# Patient Record
Sex: FEMALE | Race: WHITE | URBAN - METROPOLITAN AREA
[De-identification: names, ages, dates, MRNs, and addresses within clinical notes are randomized per-mention and may not be internally consistent; named-entity substitution may affect disease eponyms.]

---

## 2017-01-10 ENCOUNTER — OUTPATIENT (OUTPATIENT)
Dept: OUTPATIENT SERVICES | Facility: HOSPITAL | Age: 67
LOS: 1 days | Discharge: HOME | End: 2017-01-10

## 2017-01-10 DIAGNOSIS — D47.2 MONOCLONAL GAMMOPATHY: ICD-10-CM

## 2017-01-11 ENCOUNTER — APPOINTMENT (OUTPATIENT)
Dept: INFUSION THERAPY | Facility: HOSPITAL | Age: 67
End: 2017-01-11

## 2017-01-12 ENCOUNTER — APPOINTMENT (OUTPATIENT)
Dept: INFUSION THERAPY | Facility: HOSPITAL | Age: 67
End: 2017-01-12

## 2017-02-14 ENCOUNTER — OUTPATIENT (OUTPATIENT)
Dept: OUTPATIENT SERVICES | Facility: HOSPITAL | Age: 67
LOS: 1 days | Discharge: HOME | End: 2017-02-14

## 2017-02-14 DIAGNOSIS — D47.2 MONOCLONAL GAMMOPATHY: ICD-10-CM

## 2017-02-14 DIAGNOSIS — L98.5 MUCINOSIS OF THE SKIN: ICD-10-CM

## 2017-02-15 ENCOUNTER — APPOINTMENT (OUTPATIENT)
Dept: HEMATOLOGY ONCOLOGY | Facility: CLINIC | Age: 67
End: 2017-02-15

## 2017-02-22 ENCOUNTER — MEDICATION RENEWAL (OUTPATIENT)
Age: 67
End: 2017-02-22

## 2017-02-22 ENCOUNTER — APPOINTMENT (OUTPATIENT)
Dept: INFUSION THERAPY | Facility: HOSPITAL | Age: 67
End: 2017-02-22

## 2017-02-23 ENCOUNTER — APPOINTMENT (OUTPATIENT)
Dept: INFUSION THERAPY | Facility: HOSPITAL | Age: 67
End: 2017-02-23

## 2017-02-25 ENCOUNTER — OUTPATIENT (OUTPATIENT)
Dept: OUTPATIENT SERVICES | Facility: HOSPITAL | Age: 67
LOS: 1 days | End: 2017-02-25
Payer: MEDICARE

## 2017-02-25 ENCOUNTER — APPOINTMENT (OUTPATIENT)
Dept: CT IMAGING | Facility: IMAGING CENTER | Age: 67
End: 2017-02-25

## 2017-02-25 DIAGNOSIS — Z00.8 ENCOUNTER FOR OTHER GENERAL EXAMINATION: ICD-10-CM

## 2017-02-25 PROCEDURE — 71250 CT THORAX DX C-: CPT

## 2017-02-27 ENCOUNTER — APPOINTMENT (OUTPATIENT)
Dept: NUCLEAR MEDICINE | Facility: HOSPITAL | Age: 67
End: 2017-02-27

## 2017-02-27 ENCOUNTER — OUTPATIENT (OUTPATIENT)
Dept: OUTPATIENT SERVICES | Facility: HOSPITAL | Age: 67
LOS: 1 days | End: 2017-02-27
Payer: MEDICARE

## 2017-02-27 DIAGNOSIS — I26.90 SEPTIC PULMONARY EMBOLISM WITHOUT ACUTE COR PULMONALE: ICD-10-CM

## 2017-02-27 PROCEDURE — A9540: CPT

## 2017-02-27 PROCEDURE — A9567: CPT

## 2017-02-27 PROCEDURE — 78582 LUNG VENTILAT&PERFUS IMAGING: CPT

## 2017-02-27 PROCEDURE — 71046 X-RAY EXAM CHEST 2 VIEWS: CPT

## 2017-03-05 ENCOUNTER — TRANSCRIPTION ENCOUNTER (OUTPATIENT)
Age: 67
End: 2017-03-05

## 2017-03-06 ENCOUNTER — APPOINTMENT (OUTPATIENT)
Dept: HEMATOLOGY ONCOLOGY | Facility: CLINIC | Age: 67
End: 2017-03-06

## 2017-03-06 VITALS
WEIGHT: 200.84 LBS | DIASTOLIC BLOOD PRESSURE: 84 MMHG | RESPIRATION RATE: 16 BRPM | HEART RATE: 86 BPM | SYSTOLIC BLOOD PRESSURE: 132 MMHG | BODY MASS INDEX: 31.46 KG/M2 | TEMPERATURE: 97.9 F | OXYGEN SATURATION: 93 %

## 2017-03-13 ENCOUNTER — APPOINTMENT (OUTPATIENT)
Dept: UROLOGY | Facility: CLINIC | Age: 67
End: 2017-03-13

## 2017-03-15 ENCOUNTER — RESULT REVIEW (OUTPATIENT)
Age: 67
End: 2017-03-15

## 2017-03-16 ENCOUNTER — OUTPATIENT (OUTPATIENT)
Dept: OUTPATIENT SERVICES | Facility: HOSPITAL | Age: 67
LOS: 1 days | End: 2017-03-16
Payer: MEDICARE

## 2017-03-16 DIAGNOSIS — R91.8 OTHER NONSPECIFIC ABNORMAL FINDING OF LUNG FIELD: ICD-10-CM

## 2017-03-16 LAB
GRAM STN FLD: SIGNIFICANT CHANGE UP
NIGHT BLUE STAIN TISS: SIGNIFICANT CHANGE UP
SPECIMEN SOURCE: SIGNIFICANT CHANGE UP
SPECIMEN SOURCE: SIGNIFICANT CHANGE UP

## 2017-03-16 PROCEDURE — 88305 TISSUE EXAM BY PATHOLOGIST: CPT | Mod: 26

## 2017-03-16 PROCEDURE — 88112 CYTOPATH CELL ENHANCE TECH: CPT | Mod: 26

## 2017-03-16 PROCEDURE — 88313 SPECIAL STAINS GROUP 2: CPT | Mod: 26

## 2017-03-16 PROCEDURE — 87102 FUNGUS ISOLATION CULTURE: CPT

## 2017-03-16 PROCEDURE — 87015 SPECIMEN INFECT AGNT CONCNTJ: CPT

## 2017-03-16 PROCEDURE — 87070 CULTURE OTHR SPECIMN AEROBIC: CPT

## 2017-03-16 PROCEDURE — 71045 X-RAY EXAM CHEST 1 VIEW: CPT

## 2017-03-16 PROCEDURE — 31624 DX BRONCHOSCOPE/LAVAGE: CPT | Mod: RT

## 2017-03-16 PROCEDURE — 76000 FLUOROSCOPY <1 HR PHYS/QHP: CPT

## 2017-03-16 PROCEDURE — 71010: CPT | Mod: 26

## 2017-03-16 PROCEDURE — 87206 SMEAR FLUORESCENT/ACID STAI: CPT

## 2017-03-16 PROCEDURE — 87116 MYCOBACTERIA CULTURE: CPT

## 2017-03-16 PROCEDURE — 88313 SPECIAL STAINS GROUP 2: CPT

## 2017-03-16 PROCEDURE — 88112 CYTOPATH CELL ENHANCE TECH: CPT

## 2017-03-16 PROCEDURE — 31625 BRONCHOSCOPY W/BIOPSY(S): CPT | Mod: RT

## 2017-03-16 PROCEDURE — 88305 TISSUE EXAM BY PATHOLOGIST: CPT

## 2017-03-18 LAB
CULTURE RESULTS: SIGNIFICANT CHANGE UP
SPECIMEN SOURCE: SIGNIFICANT CHANGE UP

## 2017-03-20 LAB — NON-GYNECOLOGICAL CYTOLOGY STUDY: SIGNIFICANT CHANGE UP

## 2017-03-27 ENCOUNTER — APPOINTMENT (OUTPATIENT)
Dept: UROLOGY | Facility: CLINIC | Age: 67
End: 2017-03-27

## 2017-03-28 ENCOUNTER — OUTPATIENT (OUTPATIENT)
Dept: OUTPATIENT SERVICES | Facility: HOSPITAL | Age: 67
LOS: 1 days | Discharge: HOME | End: 2017-03-28

## 2017-03-28 DIAGNOSIS — D47.2 MONOCLONAL GAMMOPATHY: ICD-10-CM

## 2017-03-28 DIAGNOSIS — L98.5 MUCINOSIS OF THE SKIN: ICD-10-CM

## 2017-03-29 ENCOUNTER — APPOINTMENT (OUTPATIENT)
Dept: INFUSION THERAPY | Facility: HOSPITAL | Age: 67
End: 2017-03-29

## 2017-03-30 ENCOUNTER — APPOINTMENT (OUTPATIENT)
Dept: INFUSION THERAPY | Facility: HOSPITAL | Age: 67
End: 2017-03-30

## 2017-03-30 LAB
APPEARANCE: ABNORMAL
BACTERIA UR CULT: ABNORMAL
BACTERIA: ABNORMAL
BILIRUBIN URINE: ABNORMAL
BLOOD URINE: ABNORMAL
COLOR: YELLOW
GLUCOSE QUALITATIVE U: NORMAL
HYALINE CASTS: 0 /LPF
KETONES URINE: ABNORMAL
LEUKOCYTE ESTERASE URINE: ABNORMAL
MICROSCOPIC-UA: NORMAL
NITRITE URINE: POSITIVE
PH URINE: 6
PROTEIN URINE: 100
RED BLOOD CELLS URINE: 200 /HPF
SPECIFIC GRAVITY URINE: 1.03
SQUAMOUS EPITHELIAL CELLS: 2 /HPF
UROBILINOGEN URINE: 1
WHITE BLOOD CELLS URINE: >720 /HPF

## 2017-04-03 DIAGNOSIS — Z79.2 LONG TERM (CURRENT) USE OF ANTIBIOTICS: ICD-10-CM

## 2017-04-15 LAB
CULTURE RESULTS: SIGNIFICANT CHANGE UP
SPECIMEN SOURCE: SIGNIFICANT CHANGE UP

## 2017-04-18 ENCOUNTER — MESSAGE (OUTPATIENT)
Age: 67
End: 2017-04-18

## 2017-04-18 LAB
APPEARANCE: ABNORMAL
BACTERIA UR CULT: ABNORMAL
BACTERIA: ABNORMAL
BILIRUBIN URINE: NEGATIVE
BLOOD URINE: ABNORMAL
CALCIUM OXALATE CRYSTALS: ABNORMAL
COLOR: YELLOW
GLUCOSE QUALITATIVE U: NORMAL MG/DL
HYALINE CASTS: 0 /LPF
KETONES URINE: NEGATIVE
LEUKOCYTE ESTERASE URINE: ABNORMAL
MICROSCOPIC-UA: NORMAL
NITRITE URINE: POSITIVE
PH URINE: 5.5
PROTEIN URINE: NEGATIVE MG/DL
RED BLOOD CELLS URINE: 37 /HPF
SPECIFIC GRAVITY URINE: 1.02
SQUAMOUS EPITHELIAL CELLS: 4 /HPF
UROBILINOGEN URINE: NORMAL MG/DL
WHITE BLOOD CELLS URINE: 315 /HPF

## 2017-04-18 RX ORDER — NITROFURANTOIN MACROCRYSTALS 50 MG/1
50 CAPSULE ORAL
Qty: 30 | Refills: 1 | Status: DISCONTINUED | COMMUNITY
Start: 2017-03-30 | End: 2017-04-18

## 2017-04-18 RX ORDER — SULFAMETHOXAZOLE AND TRIMETHOPRIM 800; 160 MG/1; MG/1
800-160 TABLET ORAL
Qty: 14 | Refills: 0 | Status: DISCONTINUED | COMMUNITY
Start: 2017-04-18 | End: 2017-04-18

## 2017-04-18 RX ORDER — FOSFOMYCIN TROMETHAMINE 3 G/1
3 POWDER ORAL
Qty: 2 | Refills: 0 | Status: DISCONTINUED | COMMUNITY
Start: 2017-04-18 | End: 2017-04-18

## 2017-04-18 RX ORDER — FOSFOMYCIN TROMETHAMINE 3 G/1
3 POWDER ORAL
Qty: 2 | Refills: 0 | Status: DISCONTINUED | COMMUNITY
Start: 2017-03-30 | End: 2017-04-18

## 2017-04-18 RX ORDER — AMOXICILLIN 500 MG/1
500 TABLET, FILM COATED ORAL
Qty: 20 | Refills: 0 | Status: DISCONTINUED | COMMUNITY
Start: 2017-04-03 | End: 2017-04-18

## 2017-04-29 LAB
CULTURE RESULTS: SIGNIFICANT CHANGE UP
SPECIMEN SOURCE: SIGNIFICANT CHANGE UP

## 2017-05-08 ENCOUNTER — OUTPATIENT (OUTPATIENT)
Dept: OUTPATIENT SERVICES | Facility: HOSPITAL | Age: 67
LOS: 1 days | Discharge: HOME | End: 2017-05-08

## 2017-05-08 DIAGNOSIS — D47.2 MONOCLONAL GAMMOPATHY: ICD-10-CM

## 2017-05-10 ENCOUNTER — APPOINTMENT (OUTPATIENT)
Dept: INFUSION THERAPY | Facility: HOSPITAL | Age: 67
End: 2017-05-10

## 2017-05-11 ENCOUNTER — APPOINTMENT (OUTPATIENT)
Dept: INFUSION THERAPY | Facility: HOSPITAL | Age: 67
End: 2017-05-11

## 2017-05-12 ENCOUNTER — EMERGENCY (EMERGENCY)
Facility: HOSPITAL | Age: 67
LOS: 1 days | Discharge: HOME | End: 2017-05-12
Attending: EMERGENCY MEDICINE | Admitting: EMERGENCY MEDICINE
Payer: MEDICARE

## 2017-05-12 VITALS
SYSTOLIC BLOOD PRESSURE: 170 MMHG | HEART RATE: 70 BPM | OXYGEN SATURATION: 96 % | RESPIRATION RATE: 18 BRPM | TEMPERATURE: 100 F | DIASTOLIC BLOOD PRESSURE: 99 MMHG

## 2017-05-12 DIAGNOSIS — R51 HEADACHE: ICD-10-CM

## 2017-05-12 LAB
ALBUMIN SERPL ELPH-MCNC: 3.5 G/DL — SIGNIFICANT CHANGE UP (ref 3.3–5)
ALP SERPL-CCNC: 45 U/L — SIGNIFICANT CHANGE UP (ref 40–120)
ALT FLD-CCNC: 27 U/L RC — SIGNIFICANT CHANGE UP (ref 10–45)
ANION GAP SERPL CALC-SCNC: 14 MMOL/L — SIGNIFICANT CHANGE UP (ref 5–17)
APPEARANCE UR: CLEAR — SIGNIFICANT CHANGE UP
AST SERPL-CCNC: 47 U/L — HIGH (ref 10–40)
BASOPHILS # BLD AUTO: 0 K/UL — SIGNIFICANT CHANGE UP (ref 0–0.2)
BASOPHILS NFR BLD AUTO: 0 % — SIGNIFICANT CHANGE UP (ref 0–2)
BILIRUB SERPL-MCNC: 0.3 MG/DL — SIGNIFICANT CHANGE UP (ref 0.2–1.2)
BILIRUB UR-MCNC: NEGATIVE — SIGNIFICANT CHANGE UP
BUN SERPL-MCNC: 10 MG/DL — SIGNIFICANT CHANGE UP (ref 7–23)
CALCIUM SERPL-MCNC: 8.6 MG/DL — SIGNIFICANT CHANGE UP (ref 8.4–10.5)
CHLORIDE SERPL-SCNC: 98 MMOL/L — SIGNIFICANT CHANGE UP (ref 96–108)
CO2 SERPL-SCNC: 18 MMOL/L — LOW (ref 22–31)
COLOR SPEC: YELLOW — SIGNIFICANT CHANGE UP
COMMENT - URINE: SIGNIFICANT CHANGE UP
CREAT SERPL-MCNC: 0.5 MG/DL — SIGNIFICANT CHANGE UP (ref 0.5–1.3)
DIFF PNL FLD: ABNORMAL
EOSINOPHIL # BLD AUTO: 0 K/UL — SIGNIFICANT CHANGE UP (ref 0–0.5)
EOSINOPHIL NFR BLD AUTO: 1.4 % — SIGNIFICANT CHANGE UP (ref 0–6)
EPI CELLS # UR: SIGNIFICANT CHANGE UP /HPF
GAS PNL BLDV: SIGNIFICANT CHANGE UP
GLUCOSE SERPL-MCNC: 111 MG/DL — HIGH (ref 70–99)
GLUCOSE UR QL: NEGATIVE — SIGNIFICANT CHANGE UP
HCT VFR BLD CALC: 29.2 % — LOW (ref 34.5–45)
HGB BLD-MCNC: 10 G/DL — LOW (ref 11.5–15.5)
KETONES UR-MCNC: NEGATIVE — SIGNIFICANT CHANGE UP
LEUKOCYTE ESTERASE UR-ACNC: NEGATIVE — SIGNIFICANT CHANGE UP
LYMPHOCYTES # BLD AUTO: 0.1 K/UL — LOW (ref 1–3.3)
LYMPHOCYTES # BLD AUTO: 7.4 % — LOW (ref 13–44)
MCHC RBC-ENTMCNC: 34.3 GM/DL — SIGNIFICANT CHANGE UP (ref 32–36)
MCHC RBC-ENTMCNC: 37.4 PG — HIGH (ref 27–34)
MCV RBC AUTO: 109 FL — HIGH (ref 80–100)
MONOCYTES # BLD AUTO: 0.2 K/UL — SIGNIFICANT CHANGE UP (ref 0–0.9)
MONOCYTES NFR BLD AUTO: 12.2 % — SIGNIFICANT CHANGE UP (ref 2–14)
NEUTROPHILS # BLD AUTO: 1.2 K/UL — LOW (ref 1.8–7.4)
NEUTROPHILS NFR BLD AUTO: 79 % — HIGH (ref 43–77)
NITRITE UR-MCNC: NEGATIVE — SIGNIFICANT CHANGE UP
PH UR: 6.5 — SIGNIFICANT CHANGE UP (ref 5–8)
PLATELET # BLD AUTO: 85 K/UL — LOW (ref 150–400)
POTASSIUM SERPL-MCNC: 3.6 MMOL/L — SIGNIFICANT CHANGE UP (ref 3.5–5.3)
POTASSIUM SERPL-SCNC: 3.6 MMOL/L — SIGNIFICANT CHANGE UP (ref 3.5–5.3)
PROT SERPL-MCNC: 8.6 G/DL — HIGH (ref 6–8.3)
PROT UR-MCNC: SIGNIFICANT CHANGE UP
RBC # BLD: 2.68 M/UL — LOW (ref 3.8–5.2)
RBC # FLD: 12.7 % — SIGNIFICANT CHANGE UP (ref 10.3–14.5)
RBC CASTS # UR COMP ASSIST: ABNORMAL /HPF (ref 0–2)
SODIUM SERPL-SCNC: 130 MMOL/L — LOW (ref 135–145)
SP GR SPEC: 1.02 — SIGNIFICANT CHANGE UP (ref 1.01–1.02)
UROBILINOGEN FLD QL: NEGATIVE — SIGNIFICANT CHANGE UP
WBC # BLD: 1.6 K/UL — LOW (ref 3.8–10.5)
WBC # FLD AUTO: 1.6 K/UL — LOW (ref 3.8–10.5)
WBC UR QL: SIGNIFICANT CHANGE UP /HPF (ref 0–5)

## 2017-05-12 PROCEDURE — 99284 EMERGENCY DEPT VISIT MOD MDM: CPT

## 2017-05-12 PROCEDURE — 70450 CT HEAD/BRAIN W/O DYE: CPT | Mod: 26

## 2017-05-12 RX ORDER — METOCLOPRAMIDE HCL 10 MG
10 TABLET ORAL ONCE
Qty: 0 | Refills: 0 | Status: COMPLETED | OUTPATIENT
Start: 2017-05-12 | End: 2017-05-12

## 2017-05-12 RX ORDER — ACETAMINOPHEN 500 MG
1000 TABLET ORAL ONCE
Qty: 0 | Refills: 0 | Status: COMPLETED | OUTPATIENT
Start: 2017-05-12 | End: 2017-05-12

## 2017-05-12 RX ADMIN — Medication 400 MILLIGRAM(S): at 22:34

## 2017-05-12 RX ADMIN — Medication 10 MILLIGRAM(S): at 22:00

## 2017-05-12 NOTE — ED PROVIDER NOTE - SKIN, MLM
Skin normal color for race, warm, dry and intact. **ATTENDING ADDENDUM (Dr. Tramaine Tejada): NO rashes, lesions, vesicles, cellulitis, petechiae, purpurae, track marks or ecchymoses.

## 2017-05-12 NOTE — ED PROVIDER NOTE - ATTENDING CONTRIBUTION TO CARE
**ATTENDING ADDENDUM (Dr. Tramaine Tejada): I attest that I have directly examined this patient and reviewed and formulated the diagnostic and therapeutic management plan in collaboration with the advanced practitioner (NP, PA). Please see MDM note and remainder of EMR for findings from CC, HPI, ROS, and PE. (Sarah)

## 2017-05-12 NOTE — ED ADULT NURSE NOTE - PMH
Atrial fibrillation    Blood Chemistry Abnormality  Scleramyxedema  Hyperlipidemia    Transplants  Stem cell transplant March 2001  Tubal Ligation  1988  Uterus Problem  Prolapse   total abdominal hysterectomy

## 2017-05-12 NOTE — ED ADULT NURSE NOTE - OBJECTIVE STATEMENT
66F with PMH a.fib on xarelto, multiple myeloma, and scleroderma presenting with worsening HA since yesterday afternoon. Pt follows at [facility] and had ninlaro 2 days ago, and IVIG last 2 days. Reports she normally gets HA and fever/chills after IVIG but this time it's worse. HA is 9/10 located all over her head and radiating down back of neck. Took tylenol with codeine with no relief. Associated fever/chills and nausea. Also reports chronic UTIs, recently finished course of abx and awaiting f/u urine culture results from urologist. + chronic neuropathy in hands/feet, + chronic SOB.  neuro exam wnl, no facial asymmetry, vitals stable, dtr present

## 2017-05-12 NOTE — ED PROVIDER NOTE - GASTROINTESTINAL, MLM
Abdomen soft, non-tender, no guarding. Abdomen soft, non-tender **ATTENDING ADDENDUM (Dr. Tramaine Tejada): NO guarding, rebound, or rigidity. NO pulsatile or non-pulsatile masses. NO hernias. NO obvious hepatosplenomegaly.

## 2017-05-12 NOTE — ED PROVIDER NOTE - CARDIOVASCULAR NEGATIVE STATEMENT, MLM
normal rate and rhythm, no chest pain and no edema. normal rate and rhythm, no chest pain and no edema. **ATTENDING ADDENDUM (Dr. Tramaine Tejada): POSITIVE history of atrial fibrillation

## 2017-05-12 NOTE — ED PROVIDER NOTE - CARE PLAN
Principal Discharge DX:	Acute nonintractable headache, unspecified headache type Principal Discharge DX:	Acute nonintractable headache, unspecified headache type  Goal:	ATTENDING ADDENDUM (Dr. Tramaine Tejada): Goals of care include resolution of emergent/urgent symptoms and concerns, and restoration to baseline level of homeostasis. Principal Discharge DX:	Acute nonintractable headache, unspecified headache type  Goal:	ATTENDING ADDENDUM (Dr. Tramaine Tejada): Goals of care include resolution of emergent/urgent symptoms and concerns, and restoration to baseline level of homeostasis.  Instructions for follow-up, activity and diet:	ATTENDING ADDENDUM (Dr. Tramaine Tejada): (1) anticipatory guidance provided  (2) rest  (3) outpatient follow-up with your primary care physician/provider (4) return if symptoms worsen, persist, or do not resolve (5) medications, if indicated, as prescribed (oxycodone, acetaminophen, metoclopramide) for nausea and headache. (6) call your doctors at the [facility] tomorrow morning or Monday as instructed (7) if headache and/or nausea persist, worsen, or do not resolve, then consider returning for reevaluation. Principal Discharge DX:	Acute nonintractable headache, unspecified headache type  Goal:	ATTENDING ADDENDUM (Dr. Tramaine Tejada): Goals of care include resolution of emergent/urgent symptoms and concerns, and restoration to baseline level of homeostasis.  Instructions for follow-up, activity and diet:	ATTENDING ADDENDUM (Dr. Tramaine Tejada): (1) anticipatory guidance provided  (2) rest  (3) outpatient follow-up with your primary care physician/provider (4) return if symptoms worsen, persist, or do not resolve (5) medications, if indicated, as prescribed (oxycodone, acetaminophen, metoclopramide) for nausea and headache. (6) call your doctors at the [facility] tomorrow morning or Monday as instructed (7) if headache and/or nausea persist, worsen, or do not resolve, then consider returning for reevaluation. Principal Discharge DX:	Acute nonintractable headache, unspecified headache type  Goal:	ATTENDING ADDENDUM (Dr. Tramaine Tejada): Goals of care include resolution of emergent/urgent symptoms and concerns, and restoration to baseline level of homeostasis.  Instructions for follow-up, activity and diet:	ATTENDING ADDENDUM (Dr. Tramaine Tejada): (1) anticipatory guidance provided  (2) rest  (3) outpatient follow-up with your primary care physician/provider (4) return if symptoms worsen, persist, or do not resolve (5) medications, if indicated, as prescribed (oxycodone, acetaminophen, metoclopramide) for nausea and headache. (6) call your doctors at the [facility] tomorrow morning or Monday as instructed (7) if headache and/or nausea persist, worsen, or do not resolve, then consider returning for reevaluation.

## 2017-05-12 NOTE — ED PROVIDER NOTE - CONSTITUTIONAL, MLM
normal... well nourished, awake, alert, oriented to person, place, time/situation and in no apparent distress. - - -

## 2017-05-12 NOTE — ED PROVIDER NOTE - PHYSICAL EXAMINATION
**ATTENDING ADDENDUM (Dr. Tramaine Tejada): I have reviewed and substantially contributed to the elements of the PE as documented above. I have directly performed an examination of this patient in conjunction with the other members (EM resident/PA/NP) of the patient care team.

## 2017-05-12 NOTE — ED PROVIDER NOTE - PROGRESS NOTE DETAILS
**ATTENDING ADDENDUM (Dr. Tramaine Tejada): patient serially evaluated throughout ED course. NO acute deterioration up to this time in the ED. POSITIVE headache and nausea noted. Mediport just accessed, and medication administration ongoing. Anticipatory guidance provided. Will continue to observe and monitor closely to determine efficacy of interventions. **ATTENDING ADDENDUM (Dr. Tramaine Tejada): patient with some improvement following the administration of medications (metoclopramide, acetaminophen) as ordered. Sleeping at this time. Will continue to observe and monitor closely. Awaiting completion of all ED diagnostics, including differential for WBC count of 1.6/mm3. Anticipatory guidance provided. **ATTENDING ADDENDUM (Dr. Tramaine Tejada): patient serially evaluated throughout ED course. NO acute deterioration up to this time in the ED. Feels marked improvement (ambulatory to the bathroom) but still with some residual headache at this time. Will prescribe additional analgesics. Anticipatory guidance provided. Will continue to observe and monitor closely. **ATTENDING ADDENDUM (Dr. Tramaine Tejada): patient serially evaluated throughout ED course. NO acute deterioration up to this time in the ED. Feels marked improvement (ambulatory to the bathroom) but still with some residual headache at this time. Will prescribe additional analgesics. Anticipatory guidance provided. Will continue to observe and monitor closely. Call placed to oncology fellow ([facility]) to discuss lab results. 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 meningitis, meningococcemia, or encephalitis at this time. **ATTENDING ADDENDUM (Dr. Tramaine Tejada): NO callback from hematology-oncology fellow. Patient remains improved. NO evidence of neutropenia. Agree with discharge home with close outpatient followup with primary care physician/provider. Anticipatory guidance provided.

## 2017-05-12 NOTE — ED PROVIDER NOTE - OBJECTIVE STATEMENT
66F with PMH a.fib on xarelto, multiple myeloma, and scleroderma presenting with worsening HA since yesterday afternoon. Pt follows at [facility] and had ninlaro 2 days ago, and IVIG last 2 days. Reports she normally gets HA and fever/chills after IVIG but this time it's worse. HA is 9/10 located all over her head and radiating down back of neck. Took tylenol with codeine with no relief. Associated fever/chills and nausea. Also reports chronic UTIs, recently finished course of abx and awaiting f/u urine culture results from urologist. + chronic neuropathy in hands/feet, + chronic SOB.  Denies dizziness, vision changes, neck stiffness, new numbness/tingling, CP, new/worsening SOB, abd pain, vomiting, urinary symptoms. 66F with PMH a.fib on xarelto, multiple myeloma, and scleroderma presenting with worsening HA since yesterday afternoon. Pt follows at [facility] and had ninlaro 2 days ago, and IVIG last 2 days. Reports she normally gets HA and fever/chills after IVIG but this time it's worse. HA is 9/10 located all over her head and radiating down back of neck. Took tylenol with codeine with no relief. Associated fever/chills and nausea. Also reports chronic UTIs, recently finished course of abx and awaiting f/u urine culture results from urologist. + chronic neuropathy in hands/feet, + chronic SOB.  Denies dizziness, vision changes, neck stiffness, new numbness/tingling, CP, new/worsening SOB, abd pain, vomiting, urinary symptoms.  **ATTENDING ADDENDUM (Dr. Tramaine Tejada): I attest that I have directly and personally interviewed and examined this patient and elicited a comparable history of present illness and review of systems as documented, along with my EM resident. I attest that I have made significant contributions to the documentation where necessary and as noted in the EMR.

## 2017-05-12 NOTE — ED PROVIDER NOTE - MUSCULOSKELETAL [+], MLM
NECK PAIN NECK PAIN/**ATTENDING ADDENDUM (Dr. Tramaine Tejada): history of chronic back pain (s/p laminectomy). POSITIVE history of peripheral neuropathy (chronic).

## 2017-05-12 NOTE — ED ADULT NURSE NOTE - PSH
Backpain  Laminectomy  Hip Joint Replacement  left hip replacement  Knee Problem  Arthroscopy left knee

## 2017-05-12 NOTE — ED PROVIDER NOTE - MEDICAL DECISION MAKING DETAILS
**ATTENDING MEDICAL DECISION MAKING/SYNTHESIS (Dr. Tramaine Tejada): I have reviewed the Chief Complaint, the HPI, the ROS, and have directly performed and confirmed the findings on the Physical Examination. I have reviewed the medical decision making with all providers, as applicable. The PROBLEM REPRESENTATION at this time is: 66-year-old woman with history of atrial fibrillation (on Xarelto), multiple myeloma, and scleroderma now presenting with worsening HA since yesterday afternoon after receiving IVIG infusion. The MOST LIKELY DIAGNOSIS, and the LIST OF DIFFERENTIAL DIAGNOSES, includes (but is not limited to) the following: adverse drug/medication reaction (possible), intracerebral hemorrhage (possible but less likely), electrolyte-metabolic-endocrine derangements, dehydration, other inflammatory disorder, serious bacterial infection or sepsis/severe sepsis e.g. meningococcemia, meningitis (viral or bacterial), or encephalitis (unlikely given presentation and clinical findings).. The likelihood of each of these diagnoses has been appropriately considered in the context of this patient's presentation and my evaluation. PLAN: as described in EMR, including diagnostics, therapeutics and consultation as clinically warranted. I will continue to reevaluate the patient, including the results of all testing, and monitor response to therapy throughout the patient's course in the ED.

## 2017-05-12 NOTE — ED PROVIDER NOTE - CONTEXT
known (describe)/**ATTENDING ADDENDUM (Dr. Tramaine Tejada): recent IVIG and immunosuppressive therapy

## 2017-05-12 NOTE — ED PROVIDER NOTE - RESPIRATORY, MLM
Breath sounds clear and equal bilaterally. Breath sounds clear and equal bilaterally. **ATTENDING ADDENDUM (Dr. Tramaine Tejada): NO wheezing, rales, rhonchi, crackles, stridor, drooling, retractions, nasal flaring, or tripoding.

## 2017-05-12 NOTE — ED PROVIDER NOTE - PLAN OF CARE
ATTENDING ADDENDUM (Dr. Tramaine Tejada): Goals of care include resolution of emergent/urgent symptoms and concerns, and restoration to baseline level of homeostasis. ATTENDING ADDENDUM (Dr. Tramaine Tejada): (1) anticipatory guidance provided  (2) rest  (3) outpatient follow-up with your primary care physician/provider (4) return if symptoms worsen, persist, or do not resolve (5) medications, if indicated, as prescribed (oxycodone, acetaminophen, metoclopramide) for nausea and headache. (6) call your doctors at the [email or web address]] tomorrow morning or Monday as instructed (7) if headache and/or nausea persist, worsen, or do not resolve, then consider returning for reevaluation.

## 2017-05-12 NOTE — ED PROVIDER NOTE - AGGRAVATING FACTORS
**ATTENDING ADDENDUM (Dr. Tramaine Tejada): NO movement-associated, pleuritic, reproducible, positional, or exertional component to the symptoms.

## 2017-05-12 NOTE — ED PROVIDER NOTE - DIAGNOSTIC INTERPRETATION
**ATTENDING ADDENDUM (Dr. Tramaine Tejada): Radiographs reviewed. Pertinent findings include: colloid cyst without evidence of intracerebral hemorrhage, mass, or shift. (patient aware of colloid cyst)

## 2017-05-12 NOTE — ED PROVIDER NOTE - EYES, MLM
Clear bilaterally, pupils equal, round and reactive to light. Clear bilaterally, pupils equal, round and reactive to light. **ATTENDING ADDENDUM (Dr. Tramaine Tejada): Extraocular muscle movements intact. NO nystagmus. NO photophobia.

## 2017-05-13 VITALS
HEART RATE: 82 BPM | TEMPERATURE: 98 F | SYSTOLIC BLOOD PRESSURE: 125 MMHG | DIASTOLIC BLOOD PRESSURE: 74 MMHG | RESPIRATION RATE: 18 BRPM | OXYGEN SATURATION: 96 %

## 2017-05-13 LAB
CULTURE RESULTS: SIGNIFICANT CHANGE UP
SPECIMEN SOURCE: SIGNIFICANT CHANGE UP

## 2017-05-13 PROCEDURE — 84132 ASSAY OF SERUM POTASSIUM: CPT

## 2017-05-13 PROCEDURE — 82435 ASSAY OF BLOOD CHLORIDE: CPT

## 2017-05-13 PROCEDURE — 82803 BLOOD GASES ANY COMBINATION: CPT

## 2017-05-13 PROCEDURE — 99284 EMERGENCY DEPT VISIT MOD MDM: CPT | Mod: 25

## 2017-05-13 PROCEDURE — 80053 COMPREHEN METABOLIC PANEL: CPT

## 2017-05-13 PROCEDURE — 96374 THER/PROPH/DIAG INJ IV PUSH: CPT

## 2017-05-13 PROCEDURE — 96375 TX/PRO/DX INJ NEW DRUG ADDON: CPT

## 2017-05-13 PROCEDURE — 85027 COMPLETE CBC AUTOMATED: CPT

## 2017-05-13 PROCEDURE — 82947 ASSAY GLUCOSE BLOOD QUANT: CPT

## 2017-05-13 PROCEDURE — 82330 ASSAY OF CALCIUM: CPT

## 2017-05-13 PROCEDURE — 81001 URINALYSIS AUTO W/SCOPE: CPT

## 2017-05-13 PROCEDURE — 70450 CT HEAD/BRAIN W/O DYE: CPT

## 2017-05-13 PROCEDURE — 87086 URINE CULTURE/COLONY COUNT: CPT

## 2017-05-13 PROCEDURE — 84295 ASSAY OF SERUM SODIUM: CPT

## 2017-05-13 PROCEDURE — 85014 HEMATOCRIT: CPT

## 2017-05-13 PROCEDURE — 83605 ASSAY OF LACTIC ACID: CPT

## 2017-05-13 RX ORDER — OXYCODONE HYDROCHLORIDE 5 MG/1
5 TABLET ORAL ONCE
Qty: 0 | Refills: 0 | Status: DISCONTINUED | OUTPATIENT
Start: 2017-05-13 | End: 2017-05-13

## 2017-05-13 RX ORDER — OXYCODONE HYDROCHLORIDE 5 MG/1
1 TABLET ORAL
Qty: 12 | Refills: 0 | OUTPATIENT
Start: 2017-05-13 | End: 2017-05-16

## 2017-05-13 RX ORDER — METOCLOPRAMIDE HCL 10 MG
1 TABLET ORAL
Qty: 16 | Refills: 0 | OUTPATIENT
Start: 2017-05-13 | End: 2017-05-17

## 2017-05-13 RX ADMIN — OXYCODONE HYDROCHLORIDE 5 MILLIGRAM(S): 5 TABLET ORAL at 00:57

## 2017-05-13 NOTE — ED ADULT NURSE REASSESSMENT NOTE - NS ED NURSE REASSESS COMMENT FT1
+relief in headache with IV tylenol
ambulatory to/from bathroom with IV pole, dtr at bedside, dispo pending
dispo pending

## 2017-05-19 LAB
APPEARANCE: CLEAR
BACTERIA: NEGATIVE
BILIRUBIN URINE: NEGATIVE
BLOOD URINE: ABNORMAL
COLOR: YELLOW
GLUCOSE QUALITATIVE U: NORMAL MG/DL
HYALINE CASTS: 3 /LPF
KETONES URINE: NEGATIVE
LEUKOCYTE ESTERASE URINE: ABNORMAL
MICROSCOPIC-UA: NORMAL
NITRITE URINE: NEGATIVE
PH URINE: 6
PROTEIN URINE: NEGATIVE MG/DL
RED BLOOD CELLS URINE: 4 /HPF
SPECIFIC GRAVITY URINE: 1.02
SQUAMOUS EPITHELIAL CELLS: 7 /HPF
UROBILINOGEN URINE: NORMAL MG/DL
WHITE BLOOD CELLS URINE: 8 /HPF

## 2017-05-26 ENCOUNTER — APPOINTMENT (OUTPATIENT)
Dept: UROLOGY | Facility: CLINIC | Age: 67
End: 2017-05-26

## 2017-05-26 VITALS
DIASTOLIC BLOOD PRESSURE: 76 MMHG | HEIGHT: 67 IN | WEIGHT: 196 LBS | SYSTOLIC BLOOD PRESSURE: 123 MMHG | TEMPERATURE: 98.3 F | HEART RATE: 62 BPM | BODY MASS INDEX: 30.76 KG/M2 | RESPIRATION RATE: 16 BRPM

## 2017-05-30 LAB
APPEARANCE: CLEAR
BACTERIA UR CULT: NORMAL
BACTERIA: NEGATIVE
BILIRUBIN URINE: NEGATIVE
BLOOD URINE: ABNORMAL
COLOR: YELLOW
GLUCOSE QUALITATIVE U: NORMAL MG/DL
KETONES URINE: NEGATIVE
LEUKOCYTE ESTERASE URINE: NEGATIVE
MICROSCOPIC-UA: NORMAL
NITRITE URINE: NEGATIVE
PH URINE: 5.5
PROTEIN URINE: NEGATIVE MG/DL
RED BLOOD CELLS URINE: 4 /HPF
SPECIFIC GRAVITY URINE: 1.02
SQUAMOUS EPITHELIAL CELLS: 0 /HPF
UROBILINOGEN URINE: NORMAL MG/DL
WHITE BLOOD CELLS URINE: 0 /HPF

## 2017-06-07 LAB — BACTERIA UR CULT: ABNORMAL

## 2017-06-12 ENCOUNTER — MESSAGE (OUTPATIENT)
Age: 67
End: 2017-06-12

## 2017-06-12 ENCOUNTER — MEDICATION RENEWAL (OUTPATIENT)
Age: 67
End: 2017-06-12

## 2017-06-12 RX ORDER — CEFPODOXIME PROXETIL 200 MG/1
200 TABLET, FILM COATED ORAL
Qty: 14 | Refills: 0 | Status: COMPLETED | COMMUNITY
Start: 2017-04-18 | End: 2017-04-25

## 2017-06-13 LAB
APPEARANCE: ABNORMAL
BACTERIA UR CULT: ABNORMAL
BACTERIA: ABNORMAL
BILIRUBIN URINE: NEGATIVE
BLOOD URINE: ABNORMAL
COLOR: ABNORMAL
GLUCOSE QUALITATIVE U: NORMAL MG/DL
HYALINE CASTS: 7 /LPF
KETONES URINE: NEGATIVE
LEUKOCYTE ESTERASE URINE: ABNORMAL
MICROSCOPIC-UA: NORMAL
NITRITE URINE: POSITIVE
PH URINE: 7
PROTEIN URINE: 30 MG/DL
RED BLOOD CELLS URINE: 9 /HPF
SPECIFIC GRAVITY URINE: 1.01
SQUAMOUS EPITHELIAL CELLS: 1 /HPF
UROBILINOGEN URINE: 1 MG/DL
WHITE BLOOD CELLS URINE: 448 /HPF

## 2017-06-14 ENCOUNTER — OUTPATIENT (OUTPATIENT)
Dept: OUTPATIENT SERVICES | Facility: HOSPITAL | Age: 67
LOS: 1 days | Discharge: HOME | End: 2017-06-14

## 2017-06-14 DIAGNOSIS — L98.5 MUCINOSIS OF THE SKIN: ICD-10-CM

## 2017-06-14 DIAGNOSIS — D47.2 MONOCLONAL GAMMOPATHY: ICD-10-CM

## 2017-06-21 ENCOUNTER — APPOINTMENT (OUTPATIENT)
Dept: INFUSION THERAPY | Facility: HOSPITAL | Age: 67
End: 2017-06-21

## 2017-06-22 ENCOUNTER — APPOINTMENT (OUTPATIENT)
Dept: INFUSION THERAPY | Facility: HOSPITAL | Age: 67
End: 2017-06-22

## 2017-07-12 ENCOUNTER — APPOINTMENT (OUTPATIENT)
Dept: UROLOGY | Facility: CLINIC | Age: 67
End: 2017-07-12

## 2017-07-12 RX ORDER — CEPHALEXIN 500 MG/1
500 CAPSULE ORAL
Qty: 14 | Refills: 0 | Status: DISCONTINUED | COMMUNITY
Start: 2017-03-06 | End: 2017-07-12

## 2017-07-17 LAB
APPEARANCE: ABNORMAL
BACTERIA UR CULT: ABNORMAL
BACTERIA: ABNORMAL
BILIRUBIN URINE: NEGATIVE
BLOOD URINE: ABNORMAL
CALCIUM OXALATE CRYSTALS: ABNORMAL
COLOR: YELLOW
GLUCOSE QUALITATIVE U: NORMAL MG/DL
GRANULAR CASTS: 1 /LPF
HYALINE CASTS: 2 /LPF
KETONES URINE: NEGATIVE
LEUKOCYTE ESTERASE URINE: ABNORMAL
MICROSCOPIC-UA: NORMAL
NITRITE URINE: POSITIVE
PH URINE: 5.5
PROTEIN URINE: ABNORMAL MG/DL
RED BLOOD CELLS URINE: 11 /HPF
SPECIFIC GRAVITY URINE: 1.02
SQUAMOUS EPITHELIAL CELLS: 5 /HPF
URINE COMMENTS: NORMAL
UROBILINOGEN URINE: NORMAL MG/DL
WHITE BLOOD CELLS URINE: 52 /HPF

## 2017-07-24 ENCOUNTER — OUTPATIENT (OUTPATIENT)
Dept: OUTPATIENT SERVICES | Facility: HOSPITAL | Age: 67
LOS: 1 days | End: 2017-07-24
Payer: MEDICARE

## 2017-07-24 ENCOUNTER — APPOINTMENT (OUTPATIENT)
Dept: UROLOGY | Facility: CLINIC | Age: 67
End: 2017-07-24

## 2017-07-24 VITALS — HEART RATE: 55 BPM | RESPIRATION RATE: 16 BRPM | SYSTOLIC BLOOD PRESSURE: 118 MMHG | DIASTOLIC BLOOD PRESSURE: 67 MMHG

## 2017-07-24 DIAGNOSIS — R35.0 FREQUENCY OF MICTURITION: ICD-10-CM

## 2017-07-24 PROCEDURE — 52000 CYSTOURETHROSCOPY: CPT

## 2017-07-26 LAB
APPEARANCE: ABNORMAL
BACTERIA UR CULT: NORMAL
BACTERIA: NEGATIVE
BILIRUBIN URINE: NEGATIVE
BLOOD URINE: ABNORMAL
COLOR: YELLOW
GLUCOSE QUALITATIVE U: NORMAL MG/DL
HYALINE CASTS: 2 /LPF
KETONES URINE: NEGATIVE
LEUKOCYTE ESTERASE URINE: NEGATIVE
MICROSCOPIC-UA: NORMAL
NITRITE URINE: NEGATIVE
PH URINE: 6
PROTEIN URINE: ABNORMAL MG/DL
RED BLOOD CELLS URINE: 7 /HPF
SPECIFIC GRAVITY URINE: 1.02
SQUAMOUS EPITHELIAL CELLS: 2 /HPF
UROBILINOGEN URINE: NORMAL MG/DL
WHITE BLOOD CELLS URINE: 7 /HPF

## 2017-07-27 DIAGNOSIS — N39.0 URINARY TRACT INFECTION, SITE NOT SPECIFIED: ICD-10-CM

## 2017-08-07 ENCOUNTER — OUTPATIENT (OUTPATIENT)
Dept: OUTPATIENT SERVICES | Facility: HOSPITAL | Age: 67
LOS: 1 days | Discharge: HOME | End: 2017-08-07

## 2017-08-07 DIAGNOSIS — D47.2 MONOCLONAL GAMMOPATHY: ICD-10-CM

## 2017-08-09 ENCOUNTER — APPOINTMENT (OUTPATIENT)
Dept: INFUSION THERAPY | Facility: HOSPITAL | Age: 67
End: 2017-08-09

## 2017-08-10 ENCOUNTER — APPOINTMENT (OUTPATIENT)
Dept: INFUSION THERAPY | Facility: HOSPITAL | Age: 67
End: 2017-08-10

## 2017-08-11 ENCOUNTER — MEDICATION RENEWAL (OUTPATIENT)
Age: 67
End: 2017-08-11

## 2017-09-05 ENCOUNTER — APPOINTMENT (OUTPATIENT)
Dept: HEMATOLOGY ONCOLOGY | Facility: CLINIC | Age: 67
End: 2017-09-05
Payer: MEDICARE

## 2017-09-05 VITALS
SYSTOLIC BLOOD PRESSURE: 126 MMHG | BODY MASS INDEX: 31.25 KG/M2 | WEIGHT: 199.52 LBS | RESPIRATION RATE: 16 BRPM | DIASTOLIC BLOOD PRESSURE: 77 MMHG | HEART RATE: 57 BPM | OXYGEN SATURATION: 100 % | TEMPERATURE: 98 F

## 2017-09-05 PROCEDURE — 99214 OFFICE O/P EST MOD 30 MIN: CPT

## 2017-09-05 RX ORDER — PHENAZOPYRIDINE HYDROCHLORIDE 200 MG/1
200 TABLET ORAL 3 TIMES DAILY
Qty: 90 | Refills: 0 | Status: DISCONTINUED | COMMUNITY
Start: 2017-07-12 | End: 2017-09-05

## 2017-09-05 RX ORDER — CEPHALEXIN 500 MG/1
500 CAPSULE ORAL
Qty: 21 | Refills: 0 | Status: DISCONTINUED | COMMUNITY
Start: 2017-07-17 | End: 2017-09-05

## 2017-09-05 RX ORDER — CIPROFLOXACIN HYDROCHLORIDE 500 MG/1
500 TABLET, FILM COATED ORAL
Qty: 6 | Refills: 0 | Status: DISCONTINUED | COMMUNITY
Start: 2017-08-11 | End: 2017-09-05

## 2017-09-08 ENCOUNTER — OUTPATIENT (OUTPATIENT)
Dept: OUTPATIENT SERVICES | Facility: HOSPITAL | Age: 67
LOS: 1 days | Discharge: HOME | End: 2017-09-08

## 2017-09-08 DIAGNOSIS — D47.2 MONOCLONAL GAMMOPATHY: ICD-10-CM

## 2017-09-16 ENCOUNTER — OUTPATIENT (OUTPATIENT)
Dept: OUTPATIENT SERVICES | Facility: HOSPITAL | Age: 67
LOS: 1 days | End: 2017-09-16
Payer: MEDICARE

## 2017-09-16 ENCOUNTER — APPOINTMENT (OUTPATIENT)
Dept: CT IMAGING | Facility: IMAGING CENTER | Age: 67
End: 2017-09-16
Payer: MEDICARE

## 2017-09-16 DIAGNOSIS — Z00.8 ENCOUNTER FOR OTHER GENERAL EXAMINATION: ICD-10-CM

## 2017-09-16 PROCEDURE — 71250 CT THORAX DX C-: CPT

## 2017-09-16 PROCEDURE — 71250 CT THORAX DX C-: CPT | Mod: 26

## 2017-10-03 ENCOUNTER — APPOINTMENT (OUTPATIENT)
Dept: INFUSION THERAPY | Facility: HOSPITAL | Age: 67
End: 2017-10-03

## 2017-10-04 ENCOUNTER — APPOINTMENT (OUTPATIENT)
Dept: INFUSION THERAPY | Facility: HOSPITAL | Age: 67
End: 2017-10-04

## 2017-10-18 ENCOUNTER — APPOINTMENT (OUTPATIENT)
Dept: INTERNAL MEDICINE | Facility: CLINIC | Age: 67
End: 2017-10-18
Payer: MEDICARE

## 2017-10-18 VITALS
BODY MASS INDEX: 31.08 KG/M2 | DIASTOLIC BLOOD PRESSURE: 80 MMHG | TEMPERATURE: 98.3 F | HEIGHT: 67 IN | SYSTOLIC BLOOD PRESSURE: 130 MMHG | WEIGHT: 198 LBS

## 2017-10-18 DIAGNOSIS — I48.0 PAROXYSMAL ATRIAL FIBRILLATION: ICD-10-CM

## 2017-10-18 DIAGNOSIS — M85.89 OTHER SPECIFIED DISORDERS OF BONE DENSITY AND STRUCTURE, MULTIPLE SITES: ICD-10-CM

## 2017-10-18 DIAGNOSIS — M81.0 AGE-RELATED OSTEOPOROSIS W/OUT CURRENT PATHOLOGICAL FRACTURE: ICD-10-CM

## 2017-10-18 DIAGNOSIS — Z23 ENCOUNTER FOR IMMUNIZATION: ICD-10-CM

## 2017-10-18 DIAGNOSIS — D63.8 ANEMIA IN OTHER CHRONIC DISEASES CLASSIFIED ELSEWHERE: ICD-10-CM

## 2017-10-18 LAB
APPEARANCE: ABNORMAL
BACTERIA UR CULT: ABNORMAL
BACTERIA: NEGATIVE
BILIRUBIN URINE: NEGATIVE
BLOOD URINE: ABNORMAL
COLOR: YELLOW
GLUCOSE QUALITATIVE U: NORMAL MG/DL
HYALINE CASTS: 2 /LPF
KETONES URINE: NEGATIVE
LEUKOCYTE ESTERASE URINE: ABNORMAL
MICROSCOPIC-UA: NORMAL
NITRITE URINE: POSITIVE
PH URINE: 6
PROTEIN URINE: ABNORMAL MG/DL
RED BLOOD CELLS URINE: 4 /HPF
SPECIFIC GRAVITY URINE: 1.02
SQUAMOUS EPITHELIAL CELLS: 1 /HPF
UROBILINOGEN URINE: NORMAL MG/DL
WHITE BLOOD CELLS URINE: 372 /HPF

## 2017-10-18 PROCEDURE — 93000 ELECTROCARDIOGRAM COMPLETE: CPT

## 2017-10-18 PROCEDURE — G0008: CPT

## 2017-10-18 PROCEDURE — 99214 OFFICE O/P EST MOD 30 MIN: CPT | Mod: 25

## 2017-10-18 PROCEDURE — 77080 DXA BONE DENSITY AXIAL: CPT | Mod: GA

## 2017-10-18 PROCEDURE — 90662 IIV NO PRSV INCREASED AG IM: CPT

## 2017-10-18 RX ORDER — PYRIDOXINE HCL (VITAMIN B6) 50 MG
50 TABLET ORAL DAILY
Refills: 0 | Status: COMPLETED | COMMUNITY
Start: 2017-03-06 | End: 2017-10-18

## 2017-11-03 ENCOUNTER — TRANSCRIPTION ENCOUNTER (OUTPATIENT)
Age: 67
End: 2017-11-03

## 2017-11-08 ENCOUNTER — TRANSCRIPTION ENCOUNTER (OUTPATIENT)
Age: 67
End: 2017-11-08

## 2017-11-09 ENCOUNTER — OUTPATIENT (OUTPATIENT)
Dept: OUTPATIENT SERVICES | Facility: HOSPITAL | Age: 67
LOS: 1 days | Discharge: HOME | End: 2017-11-09

## 2017-11-09 DIAGNOSIS — D47.2 MONOCLONAL GAMMOPATHY: ICD-10-CM

## 2017-11-14 ENCOUNTER — APPOINTMENT (OUTPATIENT)
Dept: INFUSION THERAPY | Facility: HOSPITAL | Age: 67
End: 2017-11-14

## 2017-11-15 ENCOUNTER — APPOINTMENT (OUTPATIENT)
Dept: INFUSION THERAPY | Facility: HOSPITAL | Age: 67
End: 2017-11-15

## 2017-11-15 DIAGNOSIS — Z51.11 ENCOUNTER FOR ANTINEOPLASTIC CHEMOTHERAPY: ICD-10-CM

## 2017-11-17 ENCOUNTER — LABORATORY RESULT (OUTPATIENT)
Age: 67
End: 2017-11-17

## 2017-11-17 ENCOUNTER — APPOINTMENT (OUTPATIENT)
Dept: INTERNAL MEDICINE | Facility: CLINIC | Age: 67
End: 2017-11-17
Payer: MEDICARE

## 2017-11-17 VITALS
WEIGHT: 194 LBS | SYSTOLIC BLOOD PRESSURE: 142 MMHG | DIASTOLIC BLOOD PRESSURE: 80 MMHG | HEIGHT: 67 IN | TEMPERATURE: 98.3 F | BODY MASS INDEX: 30.45 KG/M2

## 2017-11-17 DIAGNOSIS — R19.7 DIARRHEA, UNSPECIFIED: ICD-10-CM

## 2017-11-17 PROCEDURE — 99214 OFFICE O/P EST MOD 30 MIN: CPT | Mod: 25

## 2017-11-17 PROCEDURE — 36415 COLL VENOUS BLD VENIPUNCTURE: CPT

## 2017-11-21 ENCOUNTER — OTHER (OUTPATIENT)
Age: 67
End: 2017-11-21

## 2017-11-21 DIAGNOSIS — A04.72 ENTEROCOLITIS DUE TO CLOSTRIDIUM DIFFICILE, NOT SPECIFIED AS RECURRENT: ICD-10-CM

## 2017-11-21 LAB
ALBUMIN SERPL ELPH-MCNC: 3.5 G/DL
ALP BLD-CCNC: 56 U/L
ALT SERPL-CCNC: 15 U/L
ANION GAP SERPL CALC-SCNC: 11 MMOL/L
AST SERPL-CCNC: 24 U/L
BASOPHILS # BLD AUTO: 0.04 K/UL
BASOPHILS NFR BLD AUTO: 1.8 %
BILIRUB SERPL-MCNC: 0.6 MG/DL
BUN SERPL-MCNC: 13 MG/DL
C DIFF TOX GENS STL QL NAA+PROBE: NORMAL
CALCIUM SERPL-MCNC: 9.5 MG/DL
CDIFF BY PCR: DETECTED
CHLORIDE SERPL-SCNC: 103 MMOL/L
CO2 SERPL-SCNC: 23 MMOL/L
CREAT SERPL-MCNC: 0.74 MG/DL
EOSINOPHIL # BLD AUTO: 0 K/UL
EOSINOPHIL NFR BLD AUTO: 0 %
G LAMBLIA AG STL QL: NORMAL
GLUCOSE SERPL-MCNC: 96 MG/DL
HCT VFR BLD CALC: 33.4 %
HGB BLD-MCNC: 10.6 G/DL
IMM GRANULOCYTES NFR BLD AUTO: 0 %
LYMPHOCYTES # BLD AUTO: 0.6 K/UL
LYMPHOCYTES NFR BLD AUTO: 27.6 %
MAN DIFF?: NORMAL
MCHC RBC-ENTMCNC: 31.7 GM/DL
MCHC RBC-ENTMCNC: 32.7 PG
MCV RBC AUTO: 103.1 FL
MONOCYTES # BLD AUTO: 0.39 K/UL
MONOCYTES NFR BLD AUTO: 18 %
NEUTROPHILS # BLD AUTO: 1.14 K/UL
NEUTROPHILS NFR BLD AUTO: 52.6 %
PLATELET # BLD AUTO: 175 K/UL
POTASSIUM SERPL-SCNC: 4.5 MMOL/L
PROT SERPL-MCNC: 9 G/DL
RBC # BLD: 3.24 M/UL
RBC # FLD: 16.5 %
SODIUM SERPL-SCNC: 137 MMOL/L
T4 FREE SERPL-MCNC: 1.2 NG/DL
TSH SERPL-ACNC: 3.84 UIU/ML
WBC # FLD AUTO: 2.17 K/UL

## 2017-11-22 LAB — DEPRECATED O AND P PREP STL: NORMAL

## 2017-11-27 ENCOUNTER — MESSAGE (OUTPATIENT)
Age: 67
End: 2017-11-27

## 2017-11-27 RX ORDER — CIPROFLOXACIN HYDROCHLORIDE 500 MG/1
500 TABLET, FILM COATED ORAL TWICE DAILY
Qty: 14 | Refills: 0 | Status: DISCONTINUED | COMMUNITY
Start: 2017-10-18 | End: 2017-11-27

## 2017-11-28 LAB
BACTERIA STL CULT: NORMAL
FAT STL QN: NORMAL
FAT STL QN: NORMAL
WRIGHT STN STL: NEGATIVE

## 2017-11-29 LAB
APPEARANCE: CLEAR
BACTERIA UR CULT: ABNORMAL
BACTERIA: NEGATIVE
BILIRUBIN URINE: NEGATIVE
BLOOD URINE: ABNORMAL
COLOR: YELLOW
GLUCOSE QUALITATIVE U: NEGATIVE MG/DL
KETONES URINE: NEGATIVE
LEUKOCYTE ESTERASE URINE: NEGATIVE
MICROSCOPIC-UA: NORMAL
NITRITE URINE: NEGATIVE
PH URINE: 6.5
PROTEIN URINE: NEGATIVE MG/DL
RED BLOOD CELLS URINE: 4 /HPF
SPECIFIC GRAVITY URINE: 1.01
SQUAMOUS EPITHELIAL CELLS: 2 /HPF
UROBILINOGEN URINE: NEGATIVE MG/DL
WHITE BLOOD CELLS URINE: 2 /HPF

## 2017-12-29 ENCOUNTER — OUTPATIENT (OUTPATIENT)
Dept: OUTPATIENT SERVICES | Facility: HOSPITAL | Age: 67
LOS: 1 days | Discharge: HOME | End: 2017-12-29

## 2017-12-29 DIAGNOSIS — D47.2 MONOCLONAL GAMMOPATHY: ICD-10-CM

## 2017-12-29 DIAGNOSIS — L98.5 MUCINOSIS OF THE SKIN: ICD-10-CM

## 2017-12-31 ENCOUNTER — INPATIENT (INPATIENT)
Facility: HOSPITAL | Age: 67
LOS: 2 days | Discharge: HOME | DRG: 199 | End: 2018-01-03
Attending: INTERNAL MEDICINE | Admitting: INTERNAL MEDICINE
Payer: MEDICARE

## 2017-12-31 ENCOUNTER — TRANSCRIPTION ENCOUNTER (OUTPATIENT)
Age: 67
End: 2017-12-31

## 2017-12-31 VITALS
WEIGHT: 184.97 LBS | TEMPERATURE: 99 F | DIASTOLIC BLOOD PRESSURE: 67 MMHG | HEART RATE: 80 BPM | SYSTOLIC BLOOD PRESSURE: 116 MMHG | OXYGEN SATURATION: 99 % | RESPIRATION RATE: 18 BRPM

## 2017-12-31 DIAGNOSIS — Z85.79 PERSONAL HISTORY OF OTHER MALIGNANT NEOPLASMS OF LYMPHOID, HEMATOPOIETIC AND RELATED TISSUES: Chronic | ICD-10-CM

## 2017-12-31 DIAGNOSIS — J93.9 PNEUMOTHORAX, UNSPECIFIED: ICD-10-CM

## 2017-12-31 LAB
ALBUMIN SERPL ELPH-MCNC: 3.4 G/DL — SIGNIFICANT CHANGE UP (ref 3.3–5)
ALP SERPL-CCNC: 165 U/L — HIGH (ref 40–120)
ALT FLD-CCNC: 36 U/L RC — SIGNIFICANT CHANGE UP (ref 10–45)
ANION GAP SERPL CALC-SCNC: 15 MMOL/L — SIGNIFICANT CHANGE UP (ref 5–17)
APTT BLD: 147.2 SEC — CRITICAL HIGH (ref 27.5–37.4)
AST SERPL-CCNC: 31 U/L — SIGNIFICANT CHANGE UP (ref 10–40)
BASOPHILS # BLD AUTO: 0 K/UL — SIGNIFICANT CHANGE UP (ref 0–0.2)
BASOPHILS NFR BLD AUTO: 0.6 % — SIGNIFICANT CHANGE UP (ref 0–2)
BILIRUB SERPL-MCNC: 0.3 MG/DL — SIGNIFICANT CHANGE UP (ref 0.2–1.2)
BUN SERPL-MCNC: 16 MG/DL — SIGNIFICANT CHANGE UP (ref 7–23)
CALCIUM SERPL-MCNC: 9.2 MG/DL — SIGNIFICANT CHANGE UP (ref 8.4–10.5)
CHLORIDE SERPL-SCNC: 97 MMOL/L — SIGNIFICANT CHANGE UP (ref 96–108)
CO2 SERPL-SCNC: 23 MMOL/L — SIGNIFICANT CHANGE UP (ref 22–31)
CREAT SERPL-MCNC: 0.65 MG/DL — SIGNIFICANT CHANGE UP (ref 0.5–1.3)
EOSINOPHIL # BLD AUTO: 0 K/UL — SIGNIFICANT CHANGE UP (ref 0–0.5)
EOSINOPHIL NFR BLD AUTO: 0.1 % — SIGNIFICANT CHANGE UP (ref 0–6)
GAS PNL BLDV: SIGNIFICANT CHANGE UP
GLUCOSE SERPL-MCNC: 102 MG/DL — HIGH (ref 70–99)
HCT VFR BLD CALC: 30.4 % — LOW (ref 34.5–45)
HGB BLD-MCNC: 10.4 G/DL — LOW (ref 11.5–15.5)
INR BLD: 1.23 RATIO — HIGH (ref 0.88–1.16)
LYMPHOCYTES # BLD AUTO: 0.9 K/UL — LOW (ref 1–3.3)
LYMPHOCYTES # BLD AUTO: 15.1 % — SIGNIFICANT CHANGE UP (ref 13–44)
MCHC RBC-ENTMCNC: 34.2 GM/DL — SIGNIFICANT CHANGE UP (ref 32–36)
MCHC RBC-ENTMCNC: 35.8 PG — HIGH (ref 27–34)
MCV RBC AUTO: 105 FL — HIGH (ref 80–100)
MONOCYTES # BLD AUTO: 0.3 K/UL — SIGNIFICANT CHANGE UP (ref 0–0.9)
MONOCYTES NFR BLD AUTO: 5.6 % — SIGNIFICANT CHANGE UP (ref 2–14)
NEUTROPHILS # BLD AUTO: 4.5 K/UL — SIGNIFICANT CHANGE UP (ref 1.8–7.4)
NEUTROPHILS NFR BLD AUTO: 78.7 % — HIGH (ref 43–77)
PLATELET # BLD AUTO: 260 K/UL — SIGNIFICANT CHANGE UP (ref 150–400)
POTASSIUM SERPL-MCNC: 3.7 MMOL/L — SIGNIFICANT CHANGE UP (ref 3.5–5.3)
POTASSIUM SERPL-SCNC: 3.7 MMOL/L — SIGNIFICANT CHANGE UP (ref 3.5–5.3)
PROT SERPL-MCNC: 8.5 G/DL — HIGH (ref 6–8.3)
PROTHROM AB SERPL-ACNC: 13.5 SEC — HIGH (ref 9.8–12.7)
RBC # BLD: 2.9 M/UL — LOW (ref 3.8–5.2)
RBC # FLD: 14.6 % — HIGH (ref 10.3–14.5)
SODIUM SERPL-SCNC: 135 MMOL/L — SIGNIFICANT CHANGE UP (ref 135–145)
WBC # BLD: 5.7 K/UL — SIGNIFICANT CHANGE UP (ref 3.8–10.5)
WBC # FLD AUTO: 5.7 K/UL — SIGNIFICANT CHANGE UP (ref 3.8–10.5)

## 2017-12-31 PROCEDURE — 71250 CT THORAX DX C-: CPT | Mod: 26

## 2017-12-31 PROCEDURE — 32556 INSERT CATH PLEURA W/O IMAGE: CPT | Mod: GC

## 2017-12-31 PROCEDURE — 93010 ELECTROCARDIOGRAM REPORT: CPT | Mod: 59

## 2017-12-31 PROCEDURE — 71010: CPT | Mod: 26,76

## 2017-12-31 PROCEDURE — 99285 EMERGENCY DEPT VISIT HI MDM: CPT | Mod: 25,GC

## 2017-12-31 RX ORDER — HYDROMORPHONE HYDROCHLORIDE 2 MG/ML
1 INJECTION INTRAMUSCULAR; INTRAVENOUS; SUBCUTANEOUS ONCE
Qty: 0 | Refills: 0 | Status: DISCONTINUED | OUTPATIENT
Start: 2017-12-31 | End: 2017-12-31

## 2017-12-31 RX ORDER — MORPHINE SULFATE 50 MG/1
4 CAPSULE, EXTENDED RELEASE ORAL ONCE
Qty: 0 | Refills: 0 | Status: DISCONTINUED | OUTPATIENT
Start: 2017-12-31 | End: 2017-12-31

## 2017-12-31 RX ORDER — CEFTRIAXONE 500 MG/1
1 INJECTION, POWDER, FOR SOLUTION INTRAMUSCULAR; INTRAVENOUS ONCE
Qty: 0 | Refills: 0 | Status: COMPLETED | OUTPATIENT
Start: 2017-12-31 | End: 2017-12-31

## 2017-12-31 RX ADMIN — HYDROMORPHONE HYDROCHLORIDE 1 MILLIGRAM(S): 2 INJECTION INTRAMUSCULAR; INTRAVENOUS; SUBCUTANEOUS at 22:26

## 2017-12-31 NOTE — ED ADULT NURSE NOTE - PSH
Backpain  Laminectomy  H/O multiple myeloma    Hip Joint Replacement  left hip replacement  Knee Problem  Arthroscopy left knee

## 2017-12-31 NOTE — ED ADULT NURSE NOTE - PMH
Atrial fibrillation    Blood Chemistry Abnormality  Scleramyxedema  Hyperlipidemia    Multiple myeloma    Transplants  Stem cell transplant March 2001  Tubal Ligation  1988  Uterus Problem  Prolapse   total abdominal hysterectomy

## 2017-12-31 NOTE — ED PROVIDER NOTE - CARE PLAN
Principal Discharge DX:	Pneumothorax on right Principal Discharge DX:	Pneumothorax on right  Secondary Diagnosis:	Pneumonia of right lower lobe due to infectious organism

## 2017-12-31 NOTE — ED PROVIDER NOTE - ATTENDING CONTRIBUTION TO CARE
Pt with MM with cold sxs and cough with noted PTX on outpt xray today.  No distress, clear lungs b/l, normal sats.

## 2017-12-31 NOTE — ED PROVIDER NOTE - PROGRESS NOTE DETAILS
Dr. Mackey Note: called Dr. Polanco, covering for Dr. Regalado (pulmonologist), agrees with plan for ct chest and observe overnight if small PTX and will see in AM. Dr Polanco agrees with chest tube and will follow the patient on the floor. Pt consented for percutaneous thoracostomy. See procedure note ED sign out, stable w/ chest tube, admitting to Medicine -- Reddy Bey MD Ceftriaxone for possible RLL pna. Admitted to Dr Dupont. Chest tube in correct position. Satting 100% on 1L NC. Morphine for pain

## 2017-12-31 NOTE — ED ADULT NURSE NOTE - OBJECTIVE STATEMENT
Pt is a 65 yo female sent here from urgent care for a small pneumothorax. Pt states she has been coughing more frequently over the past few days and on wednesday she began to have mid back pain radiating across her whole back and SOB. Pt denies any CP no N/V/D. Pt reports fevers fluctuating over the past few weeks. Pt reports weakness over the past few weeks. She has pmh of Afib, multiple myeloma. She is currently taking a daily chemo pill.

## 2017-12-31 NOTE — ED PROVIDER NOTE - OBJECTIVE STATEMENT
Narcisa Briscoe M.D: 67F hx MM on chemo, scleroderma on IVIG p/w 2 weeks cough, runny nose, sore throat, fevers, chills, myalgias. started with upper back pain 2 days ago. went to urgent care today and was diagnosed with ptx and sent here for further evaluation. no cp no sob no abd pain no n/c/v/d  pulm: Sara.

## 2017-12-31 NOTE — ED PROVIDER NOTE - PHYSICAL EXAMINATION
Narcisa Briscoe M.D.:   patient awake alertseen lying on stretcher chronically ill appearing NAD .   LUNGS decreased breath sounds at right apex.   CARD RRR +murmur.    Abdomen soft NT ND no rebound no guarding no CVA tenderness.   EXT WWP no edema no calf tenderness CV 2+DP/PT bilaterally.   neuro A&Ox3 gait normal.    skin warm and dry no rash  HEENT: moist mucous membranes, PERRL, EOMI

## 2018-01-01 DIAGNOSIS — R79.9 ABNORMAL FINDING OF BLOOD CHEMISTRY, UNSPECIFIED: ICD-10-CM

## 2018-01-01 DIAGNOSIS — L98.5 MUCINOSIS OF THE SKIN: ICD-10-CM

## 2018-01-01 DIAGNOSIS — J93.9 PNEUMOTHORAX, UNSPECIFIED: ICD-10-CM

## 2018-01-01 DIAGNOSIS — I48.2 CHRONIC ATRIAL FIBRILLATION: ICD-10-CM

## 2018-01-01 DIAGNOSIS — J18.1 LOBAR PNEUMONIA, UNSPECIFIED ORGANISM: ICD-10-CM

## 2018-01-01 DIAGNOSIS — Z29.9 ENCOUNTER FOR PROPHYLACTIC MEASURES, UNSPECIFIED: ICD-10-CM

## 2018-01-01 DIAGNOSIS — C90.00 MULTIPLE MYELOMA NOT HAVING ACHIEVED REMISSION: ICD-10-CM

## 2018-01-01 LAB
ALBUMIN SERPL ELPH-MCNC: 3.1 G/DL — LOW (ref 3.3–5)
ALP SERPL-CCNC: 142 U/L — HIGH (ref 40–120)
ALT FLD-CCNC: 28 U/L — SIGNIFICANT CHANGE UP (ref 10–45)
ANION GAP SERPL CALC-SCNC: 12 MMOL/L — SIGNIFICANT CHANGE UP (ref 5–17)
APPEARANCE UR: ABNORMAL
APTT BLD: 60.2 SEC — HIGH (ref 27.5–37.4)
AST SERPL-CCNC: 22 U/L — SIGNIFICANT CHANGE UP (ref 10–40)
BACTERIA # UR AUTO: NEGATIVE — SIGNIFICANT CHANGE UP
BILIRUB SERPL-MCNC: 0.3 MG/DL — SIGNIFICANT CHANGE UP (ref 0.2–1.2)
BILIRUB UR-MCNC: NEGATIVE — SIGNIFICANT CHANGE UP
BUN SERPL-MCNC: 18 MG/DL — SIGNIFICANT CHANGE UP (ref 7–23)
CALCIUM SERPL-MCNC: 9.3 MG/DL — SIGNIFICANT CHANGE UP (ref 8.4–10.5)
CHLORIDE SERPL-SCNC: 98 MMOL/L — SIGNIFICANT CHANGE UP (ref 96–108)
CO2 SERPL-SCNC: 25 MMOL/L — SIGNIFICANT CHANGE UP (ref 22–31)
COD CRY URNS QL: ABNORMAL
COLOR SPEC: ABNORMAL
COMMENT - URINE: SIGNIFICANT CHANGE UP
CREAT SERPL-MCNC: 0.66 MG/DL — SIGNIFICANT CHANGE UP (ref 0.5–1.3)
DIFF PNL FLD: ABNORMAL
EPI CELLS # UR: 12 /HPF — HIGH (ref 0–5)
GLUCOSE SERPL-MCNC: 174 MG/DL — HIGH (ref 70–99)
GLUCOSE UR QL: NEGATIVE MG/DL — SIGNIFICANT CHANGE UP
HCT VFR BLD CALC: 26.9 % — LOW (ref 34.5–45)
HCT VFR BLD CALC: 28.8 % — LOW (ref 34.5–45)
HGB BLD-MCNC: 8.5 G/DL — LOW (ref 11.5–15.5)
HGB BLD-MCNC: 9.6 G/DL — LOW (ref 11.5–15.5)
HYALINE CASTS # UR AUTO: 2 /LPF — SIGNIFICANT CHANGE UP (ref 0–7)
INR BLD: 1.18 RATIO — HIGH (ref 0.88–1.16)
KETONES UR-MCNC: NEGATIVE — SIGNIFICANT CHANGE UP
LEGIONELLA AG UR QL: NEGATIVE — SIGNIFICANT CHANGE UP
LEUKOCYTE ESTERASE UR-ACNC: NEGATIVE — SIGNIFICANT CHANGE UP
MAGNESIUM SERPL-MCNC: 1.7 MG/DL — SIGNIFICANT CHANGE UP (ref 1.6–2.6)
MCHC RBC-ENTMCNC: 31.6 GM/DL — LOW (ref 32–36)
MCHC RBC-ENTMCNC: 32.6 PG — SIGNIFICANT CHANGE UP (ref 27–34)
MCHC RBC-ENTMCNC: 33.4 GM/DL — SIGNIFICANT CHANGE UP (ref 32–36)
MCHC RBC-ENTMCNC: 35.1 PG — HIGH (ref 27–34)
MCV RBC AUTO: 103.1 FL — HIGH (ref 80–100)
MCV RBC AUTO: 105 FL — HIGH (ref 80–100)
NITRITE UR-MCNC: NEGATIVE — SIGNIFICANT CHANGE UP
PH UR: 5.5 — SIGNIFICANT CHANGE UP (ref 5–8)
PHOSPHATE SERPL-MCNC: 4.1 MG/DL — SIGNIFICANT CHANGE UP (ref 2.5–4.5)
PLATELET # BLD AUTO: 237 K/UL — SIGNIFICANT CHANGE UP (ref 150–400)
PLATELET # BLD AUTO: 238 K/UL — SIGNIFICANT CHANGE UP (ref 150–400)
POTASSIUM SERPL-MCNC: 3.4 MMOL/L — LOW (ref 3.5–5.3)
POTASSIUM SERPL-SCNC: 3.4 MMOL/L — LOW (ref 3.5–5.3)
PROT SERPL-MCNC: 7.2 G/DL — SIGNIFICANT CHANGE UP (ref 6–8.3)
PROT UR-MCNC: 100 MG/DL
PROTHROM AB SERPL-ACNC: 13.4 SEC — HIGH (ref 10–13.1)
RAPID RVP RESULT: SIGNIFICANT CHANGE UP
RBC # BLD: 2.61 M/UL — LOW (ref 3.8–5.2)
RBC # BLD: 2.74 M/UL — LOW (ref 3.8–5.2)
RBC # FLD: 14.7 % — HIGH (ref 10.3–14.5)
RBC # FLD: 15.6 % — HIGH (ref 10.3–14.5)
RBC CASTS # UR COMP ASSIST: 3 /HPF — SIGNIFICANT CHANGE UP (ref 0–4)
SODIUM SERPL-SCNC: 135 MMOL/L — SIGNIFICANT CHANGE UP (ref 135–145)
SP GR SPEC: 1.03 — HIGH (ref 1.01–1.02)
UROBILINOGEN FLD QL: NEGATIVE MG/DL — SIGNIFICANT CHANGE UP
WBC # BLD: 5.2 K/UL — SIGNIFICANT CHANGE UP (ref 3.8–10.5)
WBC # BLD: 5.72 K/UL — SIGNIFICANT CHANGE UP (ref 3.8–10.5)
WBC # FLD AUTO: 5.2 K/UL — SIGNIFICANT CHANGE UP (ref 3.8–10.5)
WBC # FLD AUTO: 5.72 K/UL — SIGNIFICANT CHANGE UP (ref 3.8–10.5)
WBC UR QL: 2 /HPF — SIGNIFICANT CHANGE UP (ref 0–5)

## 2018-01-01 PROCEDURE — 99223 1ST HOSP IP/OBS HIGH 75: CPT | Mod: GC

## 2018-01-01 PROCEDURE — 71045 X-RAY EXAM CHEST 1 VIEW: CPT | Mod: 26

## 2018-01-01 PROCEDURE — 12345: CPT | Mod: GC,NC

## 2018-01-01 RX ORDER — CEFTRIAXONE 500 MG/1
1 INJECTION, POWDER, FOR SOLUTION INTRAMUSCULAR; INTRAVENOUS EVERY 24 HOURS
Qty: 0 | Refills: 0 | Status: DISCONTINUED | OUTPATIENT
Start: 2018-01-01 | End: 2018-01-02

## 2018-01-01 RX ORDER — MORPHINE SULFATE 50 MG/1
2 CAPSULE, EXTENDED RELEASE ORAL EVERY 6 HOURS
Qty: 0 | Refills: 0 | Status: DISCONTINUED | OUTPATIENT
Start: 2018-01-01 | End: 2018-01-03

## 2018-01-01 RX ORDER — RIVAROXABAN 15 MG-20MG
20 KIT ORAL EVERY 24 HOURS
Qty: 0 | Refills: 0 | Status: DISCONTINUED | OUTPATIENT
Start: 2018-01-01 | End: 2018-01-03

## 2018-01-01 RX ORDER — AZITHROMYCIN 500 MG/1
TABLET, FILM COATED ORAL
Qty: 0 | Refills: 0 | Status: DISCONTINUED | OUTPATIENT
Start: 2018-01-01 | End: 2018-01-02

## 2018-01-01 RX ORDER — CHOLECALCIFEROL (VITAMIN D3) 125 MCG
2000 CAPSULE ORAL DAILY
Qty: 0 | Refills: 0 | Status: DISCONTINUED | OUTPATIENT
Start: 2018-01-01 | End: 2018-01-03

## 2018-01-01 RX ORDER — SOTALOL HCL 120 MG
80 TABLET ORAL
Qty: 0 | Refills: 0 | Status: DISCONTINUED | OUTPATIENT
Start: 2018-01-01 | End: 2018-01-03

## 2018-01-01 RX ORDER — AZITHROMYCIN 500 MG/1
500 TABLET, FILM COATED ORAL ONCE
Qty: 0 | Refills: 0 | Status: COMPLETED | OUTPATIENT
Start: 2018-01-01 | End: 2018-01-01

## 2018-01-01 RX ORDER — MORPHINE SULFATE 50 MG/1
4 CAPSULE, EXTENDED RELEASE ORAL EVERY 6 HOURS
Qty: 0 | Refills: 0 | Status: DISCONTINUED | OUTPATIENT
Start: 2018-01-01 | End: 2018-01-03

## 2018-01-01 RX ORDER — PREGABALIN 225 MG/1
1000 CAPSULE ORAL DAILY
Qty: 0 | Refills: 0 | Status: DISCONTINUED | OUTPATIENT
Start: 2018-01-01 | End: 2018-01-03

## 2018-01-01 RX ORDER — AZITHROMYCIN 500 MG/1
500 TABLET, FILM COATED ORAL EVERY 24 HOURS
Qty: 0 | Refills: 0 | Status: DISCONTINUED | OUTPATIENT
Start: 2018-01-02 | End: 2018-01-02

## 2018-01-01 RX ORDER — ACETAMINOPHEN 500 MG
1000 TABLET ORAL ONCE
Qty: 0 | Refills: 0 | Status: COMPLETED | OUTPATIENT
Start: 2018-01-01 | End: 2018-01-01

## 2018-01-01 RX ORDER — ACETAMINOPHEN 500 MG
650 TABLET ORAL EVERY 6 HOURS
Qty: 0 | Refills: 0 | Status: DISCONTINUED | OUTPATIENT
Start: 2018-01-01 | End: 2018-01-03

## 2018-01-01 RX ORDER — POTASSIUM CHLORIDE 20 MEQ
20 PACKET (EA) ORAL ONCE
Qty: 0 | Refills: 0 | Status: COMPLETED | OUTPATIENT
Start: 2018-01-01 | End: 2018-01-01

## 2018-01-01 RX ADMIN — CEFTRIAXONE 100 GRAM(S): 500 INJECTION, POWDER, FOR SOLUTION INTRAMUSCULAR; INTRAVENOUS at 00:08

## 2018-01-01 RX ADMIN — MORPHINE SULFATE 4 MILLIGRAM(S): 50 CAPSULE, EXTENDED RELEASE ORAL at 00:08

## 2018-01-01 RX ADMIN — Medication 650 MILLIGRAM(S): at 15:54

## 2018-01-01 RX ADMIN — Medication 2000 UNIT(S): at 12:55

## 2018-01-01 RX ADMIN — Medication 400 MILLIGRAM(S): at 01:49

## 2018-01-01 RX ADMIN — AZITHROMYCIN 250 MILLIGRAM(S): 500 TABLET, FILM COATED ORAL at 03:50

## 2018-01-01 RX ADMIN — Medication 650 MILLIGRAM(S): at 16:55

## 2018-01-01 RX ADMIN — MORPHINE SULFATE 2 MILLIGRAM(S): 50 CAPSULE, EXTENDED RELEASE ORAL at 06:34

## 2018-01-01 RX ADMIN — MORPHINE SULFATE 2 MILLIGRAM(S): 50 CAPSULE, EXTENDED RELEASE ORAL at 06:58

## 2018-01-01 RX ADMIN — PREGABALIN 1000 MICROGRAM(S): 225 CAPSULE ORAL at 12:55

## 2018-01-01 RX ADMIN — RIVAROXABAN 20 MILLIGRAM(S): KIT at 19:34

## 2018-01-01 RX ADMIN — Medication 80 MILLIGRAM(S): at 12:53

## 2018-01-01 RX ADMIN — Medication 80 MILLIGRAM(S): at 19:34

## 2018-01-01 NOTE — H&P ADULT - HISTORY OF PRESENT ILLNESS
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 Pt reports that her symptoms initially started as a stomach bug w/ nausea, vomiting, and diarrhea which have resolved and progressed to URI symptoms. Her cough is productive of clear/white sputum. She endorses subjective fevers at home although she did not measure her temperature. Today she felt significant R sided back/rib pain which she attributed to her cough and her friend brought her to urgent care w/ CXR revealing R PTX for which she was transferred to the ED. She denies any sick contact or recent travel.    Pt has a long term hx of plasmal cell myeloma w/ amyloidosis and scleromyxedema. She gets IVIG for two days q8 weeks and is due on Jan 9th. She is also on Revlimid 2 weeks on and 2 weeks off and was suppose to start yesterday (Sunday 12/31) but she did not take the dose prior to coming to the ED.     Vital Signs Last 24 Hrs  T(C): 36.7 (01 Jan 2018 01:53), Max: 37.3 (31 Dec 2017 17:33)  T(F): 98 (01 Jan 2018 01:53), Max: 99.2 (31 Dec 2017 17:33)  HR: 62 (01 Jan 2018 01:53) (62 - 88)  BP: 110/68 (01 Jan 2018 01:53) (103/63 - 125/71)  RR: 18 (01 Jan 2018 01:53) (16 - 19)  SpO2: 100% (01 Jan 2018 01:53) (99% - 100%)    In the ED, CXR revealed R PTX confirmed on CT scan as well as RLL opacity concerning for infection vs. inflammatory process. Chest tube placed by ED.

## 2018-01-01 NOTE — PROGRESS NOTE ADULT - SUBJECTIVE AND OBJECTIVE BOX
CONTACT INFO  Ousmane Grubbs M.D., PGY-1  Pager: NS- [phone], [facility]- [phone]    Mon-Fri: pager covered by day team 7am-7pm;   ***Academic conferences M-F 8am-9am & 12pm-1pm- page ONLY if URGENT or if Consultant  /Linn: see chart, primary physician assigned available 7am-12pm  Sat/Loya Cross Coverage 12pm-7pm: NS- page [phone] for Team1-4, [facility]- pager forwarded to covering Resident  For Night coverage 7pm-7am: NS- page [phone] Team1-3, page [phone] Team4 & Care Model    SALVADOR COURTNEY  67y  Female      Patient is a 67y old  Female who presents with a chief complaint of SOB, rhinorrhea, cough (01 Jan 2018 02:39)      INTERVAL HPI/OVERNIGHT EVENTS: Admitted overnight, now s/p R chest tube for pneumothorax and on abx for suspected PNA. No acute events overnight, patient feels better this AM following chest tube, no pain/cough.     REVIEW OF SYSTEMS:  CONSTITUTIONAL: No fever  EYES: No eye pain  ENMT:  No difficulty hearing  NECK: No pain or stiffness  RESPIRATORY: Improved shortness of breath  CARDIOVASCULAR: No chest pain  GASTROINTESTINAL: No abdominal or epigastric pain. No diarrhea   GENITOURINARY: No dysuria  NEUROLOGICAL: No headaches  SKIN: No rash  PSYCHIATRIC: No difficulty sleeping  HEME/LYMPH: No easy bruising    Vital Signs Last 24 Hrs  T(C): 36.7 (01 Jan 2018 06:24), Max: 37.3 (31 Dec 2017 17:33)  T(F): 98.1 (01 Jan 2018 06:24), Max: 99.2 (31 Dec 2017 17:33)  HR: 54 (01 Jan 2018 06:24) (54 - 88)  BP: 114/57 (01 Jan 2018 06:24) (103/63 - 125/71)  BP(mean): --  RR: 18 (01 Jan 2018 06:24) (16 - 19)  SpO2: 97% (01 Jan 2018 06:24) (95% - 100%)    PHYSICAL EXAM:  GENERAL: NAD  HEAD:  Atraumatic  ENMT: Moist mucous membranes  NERVOUS SYSTEM:  Alert & Oriented X3, Good concentration; MAEW, SILT distal extremities  CHEST/LUNG: Breath sounds present at R lung base, +crackles. Normal lung sounds L lung fields. Chest tube dressing c/d/i, connected to suction.   HEART: Irregular  ABDOMEN: Soft, Nontender, Nondistended; Bowel sounds present  EXTREMITIES:  Warm, no edema    LABS:                        10.4   5.7   )-----------( 260      ( 31 Dec 2017 18:53 )             30.4     12-31    135  |  97  |  16  ----------------------------<  102<H>  3.7   |  23  |  0.65    Ca    9.2      31 Dec 2017 18:53    TPro  8.5<H>  /  Alb  3.4  /  TBili  0.3  /  DBili  x   /  AST  31  /  ALT  36  /  AlkPhos  165<H>  12-31    PT/INR - ( 31 Dec 2017 18:53 )   PT: 13.5 sec;   INR: 1.23 ratio         PTT - ( 31 Dec 2017 18:53 )  PTT:147.2 sec      CAPILLARY BLOOD GLUCOSE          RADIOLOGY & ADDITIONAL TESTS:  < from: Xray Chest 1 View AP -PORTABLE-Routine (12.31.17 @ 23:16) >  INTERPRETATION:  Interval placement of a right chest pigtail catheter   with decreased right pneumothorax.    < end of copied text >    < from: CT Chest No Cont (12.31.17 @ 19:47) >  IMPRESSION:     1. Small right pneumothorax.  2. Worsening of bilateral upper lobe airspace consolidations and new   patchy airspace opacities in the right lower lobe which could be   infectious or inflammatory in etiology.    < end of copied text > Team 1 Accept Note  CONTACT INFO  Ousmane Grubbs M.D., PGY-1  Pager: NS- [phone], [facility]- [phone]    Mon-Fri: pager covered by day team 7am-7pm;   ***Academic conferences M-F 8am-9am & 12pm-1pm- page ONLY if URGENT or if Consultant  /Linn: see chart, primary physician assigned available 7am-12pm  Sat/Loya Cross Coverage 12pm-7pm: NS- page [phone] for Team1-4, [facility]- pager forwarded to covering Resident  For Night coverage 7pm-7am: NS- page [phone] Team1-3, page [phone] Team4 & Care Model    SALVADOR COURTNEY  67y  Female      Patient is a 67y old  Female who presents with a chief complaint of SOB, rhinorrhea, cough (01 Jan 2018 02:39)      INTERVAL HPI/OVERNIGHT EVENTS: Team 1 Accept Note: Admitted overnight, now s/p R chest tube for pneumothorax and on abx for suspected PNA. No acute events overnight, patient feels better this AM following chest tube, no pain/cough.     REVIEW OF SYSTEMS:  CONSTITUTIONAL: No fever  EYES: No eye pain  ENMT:  No difficulty hearing  NECK: No pain or stiffness  RESPIRATORY: Improved shortness of breath  CARDIOVASCULAR: No chest pain  GASTROINTESTINAL: No abdominal or epigastric pain. No diarrhea   GENITOURINARY: No dysuria  NEUROLOGICAL: No headaches  SKIN: No rash  PSYCHIATRIC: No difficulty sleeping  HEME/LYMPH: No easy bruising    Vital Signs Last 24 Hrs  T(C): 36.7 (01 Jan 2018 06:24), Max: 37.3 (31 Dec 2017 17:33)  T(F): 98.1 (01 Jan 2018 06:24), Max: 99.2 (31 Dec 2017 17:33)  HR: 54 (01 Jan 2018 06:24) (54 - 88)  BP: 114/57 (01 Jan 2018 06:24) (103/63 - 125/71)  BP(mean): --  RR: 18 (01 Jan 2018 06:24) (16 - 19)  SpO2: 97% (01 Jan 2018 06:24) (95% - 100%)    PHYSICAL EXAM:  GENERAL: NAD  HEAD:  Atraumatic  ENMT: Moist mucous membranes  NERVOUS SYSTEM:  Alert & Oriented X3, Good concentration; MAEW, SILT distal extremities  CHEST/LUNG: Breath sounds present at R lung base, +crackles. Normal lung sounds L lung fields. Chest tube dressing c/d/i, connected to suction.   HEART: Irregular  ABDOMEN: Soft, Nontender, Nondistended; Bowel sounds present  EXTREMITIES:  Warm, no edema    LABS:                        10.4   5.7   )-----------( 260      ( 31 Dec 2017 18:53 )             30.4     12-31    135  |  97  |  16  ----------------------------<  102<H>  3.7   |  23  |  0.65    Ca    9.2      31 Dec 2017 18:53    TPro  8.5<H>  /  Alb  3.4  /  TBili  0.3  /  DBili  x   /  AST  31  /  ALT  36  /  AlkPhos  165<H>  12-31    PT/INR - ( 31 Dec 2017 18:53 )   PT: 13.5 sec;   INR: 1.23 ratio         PTT - ( 31 Dec 2017 18:53 )  PTT:147.2 sec      CAPILLARY BLOOD GLUCOSE          RADIOLOGY & ADDITIONAL TESTS:  < from: Xray Chest 1 View AP -PORTABLE-Routine (12.31.17 @ 23:16) >  INTERPRETATION:  Interval placement of a right chest pigtail catheter   with decreased right pneumothorax.    < end of copied text >    < from: CT Chest No Cont (12.31.17 @ 19:47) >  IMPRESSION:     1. Small right pneumothorax.  2. Worsening of bilateral upper lobe airspace consolidations and new   patchy airspace opacities in the right lower lobe which could be   infectious or inflammatory in etiology.    < end of copied text >

## 2018-01-01 NOTE — H&P ADULT - PMH
Atrial fibrillation    Hyperlipidemia    Multiple myeloma    Scleromyxedema    Transplants  Stem cell transplant March 2001

## 2018-01-01 NOTE — H&P ADULT - ATTENDING COMMENTS
67 F w/ plasma cell myeloma c/b amyloidosis (w/ pulmonary involvement), scleromyxedema, and afib p/w  flu-like illness for one week, chest imaging revealed a R PTX she is no  s/p Chest tube placement by ED with improvement of ptx on repeated Xr, CT chest shows new RLL infiltrate as well as previous b/l upper lobe infiltrates, will treat with short course of ABX for pneumonia. Will be seen by pulmonary during daytime.

## 2018-01-01 NOTE — ED ADULT NURSE REASSESSMENT NOTE - NS ED NURSE REASSESS COMMENT FT1
Patient had pigtail inserted on right chest wall and tolerated procedure well. Site is free of redness or swelling and attached to drainage.  Safety ensured.
Patient awake and alert x 4 with friend at the bedside.  Patient denies shortness of breath or chest pain.  Patient on 15L NRB.  Patient ambulated to the bathroom with cane and safety ensured.
Patient c/o of pain at pigtail site.  Patient's dressing is CDI and scant pink drainage noted. Patient on 2L NC and breathing is unlabored.  Safety ensured.
Patient positioned for safety and comfort and patient updated on plan of care.  Safety ensured.
Patient updated on plan of care.

## 2018-01-01 NOTE — H&P ADULT - PROBLEM SELECTOR PLAN 3
-Pt family to bring in Revlimid and Breo-Ellipta tomorrow. Will place on albuterol PRN for now.   -Pulm f/u in AM.

## 2018-01-01 NOTE — H&P ADULT - NSHPLABSRESULTS_GEN_ALL_CORE
Labs and imaging personally reviewed.                            10.4   5.7   )-----------( 260      ( 31 Dec 2017 18:53 )             30.4     12-31    135  |  97  |  16  ----------------------------<  102<H>  3.7   |  23  |  0.65    Ca    9.2      31 Dec 2017 18:53    TPro  8.5<H>  /  Alb  3.4  /  TBili  0.3  /  DBili  x   /  AST  31  /  ALT  36  /  AlkPhos  165<H>  12-31          LIVER FUNCTIONS - ( 31 Dec 2017 18:53 )  Alb: 3.4 g/dL / Pro: 8.5 g/dL / ALK PHOS: 165 U/L / ALT: 36 U/L RC / AST: 31 U/L / GGT: x             PT/INR - ( 31 Dec 2017 18:53 )   PT: 13.5 sec;   INR: 1.23 ratio         PTT - ( 31 Dec 2017 18:53 )  PTT:147.2 sec    CXR: R PTX, repeat w/ chest tube placed and interval decrease in PTX.    < from: CT Chest No Cont (12.31.17 @ 19:47) >    1. Small right pneumothorax.  2. Worsening of bilateral upper lobe airspace consolidations and new   patchy airspace opacities in the right lower lobe which could be   infectious or inflammatory in etiology.    < end of copied text >    EKG: NSR, no ST/T wave changes

## 2018-01-01 NOTE — H&P ADULT - NSHPPHYSICALEXAM_GEN_ALL_CORE
GENERAL APPEARANCE: Well developed, well nourished, alert and cooperative. NAD.   HEENT:  NC/AT, clear conjunctiva  NECK: Neck supple, non-tender without lymphadenopathy, masses  CARDIAC: Normal S1 and S2. No S3, S4 or murmurs. Rhythm is regular.  LUNGS: Rhonchi at RLL, otherwise clear. R chest tube c/d/i  ABDOMEN: Soft, nondistended, nontender. No guarding or rebound.   MUSKULOSKELETAL: No joint erythema or tenderness.   EXTREMITIES: No edema.  NEUROLOGICAL: No focal neurologic deficit. Strength and sensation symmetric and intact throughout.   SKIN: Skin clean, dry, intact  PSYCHIATRIC: AOx3. Normal affect and behavior.

## 2018-01-01 NOTE — PROGRESS NOTE ADULT - ATTENDING COMMENTS
-Patient seen/examined and discussed on 1/1/18. -Patient seen/examined and discussed on 1/1/18.  -Discussed with patient importance of repeating her CBC to watch for bleed. She understood and agree.   -Right chest tube in place and her symptoms are better. F/u with pulmonary regarding appropriate time to remove. C/w supplemental O2.    -C/w ceftriaxone and azithromycin for now empirically. However, f/u with pulmonologist as patient reports some recent findings on outside imaging, which may be what appears to be PNA on our imaging. Send urine legionella.

## 2018-01-01 NOTE — PROGRESS NOTE ADULT - SUBJECTIVE AND OBJECTIVE BOX
PULMONARY PROGRESS NOTE    SALVADOR COURTNEY  [id number]    Patient is a 67y old  Female who presents with a chief complaint of SOB, rhinorrhea, cough (01 Jan 2018 02:39)      HPI:  -Found to have small right apical ptx, sp pigtail catheter placed in ED.  Patient was having cough/sob for past 1 week.  Currently denies diffuse chest pain, cough causes pain at her port a cath site.  No sputum production    ROS:   -afebrile  -no N/V/D    ACTIVE MEDICATION LIST:  MEDICATIONS  (STANDING):  azithromycin  IVPB      cefTRIAXone   IVPB 1 Gram(s) IV Intermittent every 24 hours  cholecalciferol 2000 Unit(s) Oral daily  cyanocobalamin 1000 MICROGram(s) Oral daily  potassium chloride    Tablet ER 20 milliEquivalent(s) Oral once  rivaroxaban 20 milliGRAM(s) Oral every 24 hours  sotalol 80 milliGRAM(s) Oral two times a day    MEDICATIONS  (PRN):  morphine  - Injectable 2 milliGRAM(s) IV Push every 6 hours PRN Moderate Pain (4 - 6)  morphine  - Injectable 4 milliGRAM(s) IV Push every 6 hours PRN Severe Pain (7 - 10)      EXAM:  Vital Signs Last 24 Hrs  T(C): 36.4 (01 Jan 2018 10:15), Max: 37.3 (31 Dec 2017 17:33)  T(F): 97.6 (01 Jan 2018 10:15), Max: 99.2 (31 Dec 2017 17:33)  HR: 70 (01 Jan 2018 10:15) (54 - 88)  BP: 122/83 (01 Jan 2018 10:15) (103/63 - 125/71)  BP(mean): --  RR: 18 (01 Jan 2018 10:15) (16 - 19)  SpO2: 95% (01 Jan 2018 10:15) (95% - 100%)    GENERAL: The patient is awake and alert in no apparent distress.     SKIN: Warm, dry, no rashes    LUNGS: Clear to auscultation without wheezing, rales or rhonchi; respirations unlabored    HEART: Regular rate and rhythm without murmur.    ABDOMEN: +BS, Soft, Nontender    EXTREMITIES: No clubbing, cyanosis, edema    LABS/IMGAING: reviewed                          8.5    5.72  )-----------( 237      ( 01 Jan 2018 10:36 )             26.9     01-01    135  |  98  |  18  ----------------------------<  174<H>  3.4<L>   |  25  |  0.66    Ca    9.3      01 Jan 2018 08:08  Phos  4.1     01-01  Mg     1.7     01-01    TPro  7.2  /  Alb  3.1<L>  /  TBili  0.3  /  DBili  x   /  AST  22  /  ALT  28  /  AlkPhos  142<H>  01-01    < from: Xray Chest 1 View AP -PORTABLE-Routine (12.31.17 @ 23:16) >  IMPRESSION:  Interval placement of a right chest pigtail catheter with decreased right   pneumothorax.    < end of copied text >      PROBLEM LIST:  67y Female with HEALTH ISSUES - PROBLEM Dx:  Pneumothorax  Pneumonia  Chronic atrial fibrillation  Myeloma associated amyloidosis      RECS:  -PTX: continue chest tube to wall suction today, repeat CXR tomorrow morning, if PTX decreased would remove ct from suction.  -PNA: agree with antibiotics  -supplemental O2  -Pain control    Nancy Delarosa MD  [phone]

## 2018-01-01 NOTE — CHART NOTE - NSCHARTNOTEFT_GEN_A_CORE
Noted patient was having Hb drop from 10.4 to 8.5. Ordered repeat CBC to evaluate if patient actively having Hb drop. Patient declined, stating that "I am chronically anemic," despite being reminded that this was a drop, and not a stable low Hb, and that she could be bleeding. Patient still declined, and requested to wait until the morning to get next bloodwork.    Patient's vital signs are stable, will monitor closely.    Aryles Hedjar, PGY-2  Department of Internal Medicine  Pager [facility] [phone]/MELIZA [phone] Noted patient was having Hb drop from 10.4 to 8.5. Ordered repeat CBC to evaluate if patient actively having Hb drop. Patient declined, stating that "I am chronically anemic," despite being reminded that this was a drop, and not a stable low Hb, and that she could be bleeding. Patient still declined, and requested to wait until the morning to get next bloodwork.    Patient's vital signs are stable, will monitor closely.    Aryles Hedjar, PGY-2  Department of Internal Medicine  Pager [facility] [phone]/MELIZA [phone]      Update: patient ok with CBC, will also get type and screen

## 2018-01-01 NOTE — H&P ADULT - ASSESSMENT
67 F w/ plasma cell myeloma w/ amyloidosis (w/ pulmonary involvement), scleromyxedema, and afib p/w 1.5 weeks of flu-like symptoms found to have R PTX as well as RLL opacity concerning for PNA.

## 2018-01-01 NOTE — H&P ADULT - NSHPREVIEWOFSYSTEMS_GEN_ALL_CORE
CONSTITUTIONAL:  +fever  HEENT:  Eyes:  No visual loss, blurred vision, double vision or yellow sclerae. Ears, Nose, Throat:  No hearing loss, sneezing, congestion, runny nose or sore throat.  SKIN:  No rash or itching.  CARDIOVASCULAR:  No chest pain, chest pressure or chest discomfort. No palpitations or edema.  RESPIRATORY:  +SOB, cough, R rib/back pain  GASTROINTESTINAL:  Had nausea, vomiting, and diarrhea initially, resolved  GENITOURINARY:  Denies hematuria, dysuria.   NEUROLOGICAL:  No headache, dizziness, syncope.   MUSCULOSKELETAL:  No muscle, back pain, joint pain or stiffness.  HEMATOLOGIC:  No bleeding or bruising.

## 2018-01-01 NOTE — H&P ADULT - PROBLEM SELECTOR PLAN 1
-S/p chest tube placement in ED. May be 2/2 to persistent cough in the setting of pulmonary amyloidosis. Repeat CXR w/ improving PTX. Repeat CXR in AM.

## 2018-01-01 NOTE — H&P ADULT - PROBLEM SELECTOR PLAN 5
-c/w sotalol and xarelto -Unclear etiology, may be 2/2 to amyloidosis vs. xarelto vs. chemo side effects.

## 2018-01-02 LAB
ANION GAP SERPL CALC-SCNC: 12 MMOL/L — SIGNIFICANT CHANGE UP (ref 5–17)
APTT BLD: 80.2 SEC — HIGH (ref 27.5–37.4)
BLD GP AB SCN SERPL QL: NEGATIVE — SIGNIFICANT CHANGE UP
BUN SERPL-MCNC: 12 MG/DL — SIGNIFICANT CHANGE UP (ref 7–23)
CALCIUM SERPL-MCNC: 9.1 MG/DL — SIGNIFICANT CHANGE UP (ref 8.4–10.5)
CHLORIDE SERPL-SCNC: 103 MMOL/L — SIGNIFICANT CHANGE UP (ref 96–108)
CO2 SERPL-SCNC: 26 MMOL/L — SIGNIFICANT CHANGE UP (ref 22–31)
CREAT SERPL-MCNC: 0.55 MG/DL — SIGNIFICANT CHANGE UP (ref 0.5–1.3)
GLUCOSE SERPL-MCNC: 99 MG/DL — SIGNIFICANT CHANGE UP (ref 70–99)
HCT VFR BLD CALC: 27.4 % — LOW (ref 34.5–45)
HGB BLD-MCNC: 8.7 G/DL — LOW (ref 11.5–15.5)
INR BLD: 1.79 RATIO — HIGH (ref 0.88–1.16)
MAGNESIUM SERPL-MCNC: 1.8 MG/DL — SIGNIFICANT CHANGE UP (ref 1.6–2.6)
MCHC RBC-ENTMCNC: 31.8 GM/DL — LOW (ref 32–36)
MCHC RBC-ENTMCNC: 32.7 PG — SIGNIFICANT CHANGE UP (ref 27–34)
MCV RBC AUTO: 103 FL — HIGH (ref 80–100)
PHOSPHATE SERPL-MCNC: 3.4 MG/DL — SIGNIFICANT CHANGE UP (ref 2.5–4.5)
PLATELET # BLD AUTO: 240 K/UL — SIGNIFICANT CHANGE UP (ref 150–400)
POTASSIUM SERPL-MCNC: 3.5 MMOL/L — SIGNIFICANT CHANGE UP (ref 3.5–5.3)
POTASSIUM SERPL-SCNC: 3.5 MMOL/L — SIGNIFICANT CHANGE UP (ref 3.5–5.3)
PROTHROM AB SERPL-ACNC: 20.5 SEC — HIGH (ref 10–13.1)
RBC # BLD: 2.66 M/UL — LOW (ref 3.8–5.2)
RBC # FLD: 15.6 % — HIGH (ref 10.3–14.5)
RH IG SCN BLD-IMP: POSITIVE — SIGNIFICANT CHANGE UP
SODIUM SERPL-SCNC: 141 MMOL/L — SIGNIFICANT CHANGE UP (ref 135–145)
WBC # BLD: 4.89 K/UL — SIGNIFICANT CHANGE UP (ref 3.8–10.5)
WBC # FLD AUTO: 4.89 K/UL — SIGNIFICANT CHANGE UP (ref 3.8–10.5)

## 2018-01-02 PROCEDURE — 71045 X-RAY EXAM CHEST 1 VIEW: CPT | Mod: 26

## 2018-01-02 PROCEDURE — 99232 SBSQ HOSP IP/OBS MODERATE 35: CPT | Mod: GC

## 2018-01-02 RX ORDER — SODIUM CHLORIDE 0.65 %
1 AEROSOL, SPRAY (ML) NASAL EVERY 4 HOURS
Qty: 0 | Refills: 0 | Status: DISCONTINUED | OUTPATIENT
Start: 2018-01-02 | End: 2018-01-03

## 2018-01-02 RX ADMIN — Medication 2000 UNIT(S): at 11:39

## 2018-01-02 RX ADMIN — CEFTRIAXONE 100 GRAM(S): 500 INJECTION, POWDER, FOR SOLUTION INTRAMUSCULAR; INTRAVENOUS at 00:14

## 2018-01-02 RX ADMIN — AZITHROMYCIN 250 MILLIGRAM(S): 500 TABLET, FILM COATED ORAL at 02:23

## 2018-01-02 RX ADMIN — RIVAROXABAN 20 MILLIGRAM(S): KIT at 19:45

## 2018-01-02 RX ADMIN — Medication 80 MILLIGRAM(S): at 08:56

## 2018-01-02 RX ADMIN — Medication 80 MILLIGRAM(S): at 19:45

## 2018-01-02 NOTE — PROGRESS NOTE ADULT - SUBJECTIVE AND OBJECTIVE BOX
CONTACT INFO  Ousmane Grubbs M.D., PGY-1  Pager: NS- [phone], [facility]- [phone]    Mon-Fri: pager covered by day team 7am-7pm;   ***Academic conferences M-F 8am-9am & 12pm-1pm- page ONLY if URGENT or if Consultant  /Linn: see chart, primary physician assigned available 7am-12pm  Sat/Loya Cross Coverage 12pm-7pm: NS- page [phone] for Team1-4, [facility]- pager forwarded to covering Resident  For Night coverage 7pm-7am: NS- page [phone] Team1-3, page [phone] Team4 & Care Model    SALVADOR COURTNEY  67y  Female      Patient is a 67y old  Female who presents with a chief complaint of SOB, rhinorrhea, cough (2018 02:39)      INTERVAL HPI/OVERNIGHT EVENTS: NAEON, patient did not sleep well due to noise from water seal. No pain or cough this AM. Asking when she will be discharged.     REVIEW OF SYSTEMS:  CONSTITUTIONAL: No fever  RESPIRATORY: No cough; No shortness of breath  CARDIOVASCULAR: No chest pain  GASTROINTESTINAL: No abdominal or epigastric pain. No diarrhea  GENITOURINARY: No dysuria  NEUROLOGICAL: No headaches  MUSCULOSKELETAL: No joint pain  SKIN: No itching  LYMPH NODES: No enlarged glands  PSYCHIATRIC: +difficulty sleeping  HEME/LYMPH: No easy bleeding    Vital Signs Last 24 Hrs  T(C): 37.1 (2018 05:34), Max: 37.4 (2018 22:55)  T(F): 98.7 (2018 05:34), Max: 99.3 (2018 22:55)  HR: 65 (2018 05:34) (65 - 75)  BP: 130/84 (2018 05:34) (113/57 - 141/81)  BP(mean): --  RR: 18 (2018 05:34) (18 - 18)  SpO2: 96% (2018 05:34) (95% - 97%)    PHYSICAL EXAM:  GENERAL: NAD  HEAD:  Atraumatic  EYES: EOMI  ENMT: Moist mucous membranes  NERVOUS SYSTEM:  Alert & Oriented X3, Good concentration; MAEW, SILT distal extremity, ambulates without difficulty  CHEST/LUNG: Clear to auscultation bilaterally  HEART: Irregular  ABDOMEN: +BS, soft, non-tender  EXTREMITIES:  Warm, no edema  SKIN: No rashes or lesions    Consultant(s) Notes Reviewed: pulm    LABS:                        8.7    4.89  )-----------( 240      ( 2018 07:02 )             27.4         135  |  98  |  18  ----------------------------<  174<H>  3.4<L>   |  25  |  0.66    Ca    9.3      2018 08:08  Phos  4.1       Mg     1.7         TPro  7.2  /  Alb  3.1<L>  /  TBili  0.3  /  DBili  x   /  AST  22  /  ALT  28  /  AlkPhos  142<H>      PT/INR - ( 2018 07:02 )   PT: 20.5 sec;   INR: 1.79 ratio         PTT - ( 2018 07:02 )  PTT:80.2 sec  Urinalysis Basic - ( 2018 06:53 )    Color: Nadira / Appearance: Turbid / S.030 / pH: x  Gluc: x / Ketone: Negative  / Bili: Negative / Urobili: Negative mg/dL   Blood: x / Protein: 100 mg/dL / Nitrite: Negative   Leuk Esterase: Negative / RBC: 3 /HPF / WBC 2 /HPF   Sq Epi: x / Non Sq Epi: 12 /HPF / Bacteria: Negative CONTACT INFO  Ousmane Grubbs M.D., PGY-1  Pager: NS- [phone], [facility]- [phone]    Mon-Fri: pager covered by day team 7am-7pm;   ***Academic conferences M-F 8am-9am & 12pm-1pm- page ONLY if URGENT or if Consultant  /Linn: see chart, primary physician assigned available 7am-12pm  Sat/Loya Cross Coverage 12pm-7pm: NS- page [phone] for Team1-4, [facility]- pager forwarded to covering Resident  For Night coverage 7pm-7am: NS- page [phone] Team1-3, page [phone] Team4 & Care Model    SALVADOR COURTNEY  67y  Female      Patient is a 67y old  Female who presents with a chief complaint of SOB, rhinorrhea, cough (2018 02:39)      INTERVAL HPI/OVERNIGHT EVENTS: NAEON, patient did not sleep well due to noise from water seal. No pain or cough this AM. Asking when she will be discharged.     REVIEW OF SYSTEMS:  CONSTITUTIONAL: No fever  RESPIRATORY: No cough; No shortness of breath  CARDIOVASCULAR: No chest pain  GASTROINTESTINAL: No abdominal or epigastric pain. No diarrhea  GENITOURINARY: No dysuria  NEUROLOGICAL: No headaches  MUSCULOSKELETAL: No joint pain  SKIN: No itching  LYMPH NODES: No enlarged glands  PSYCHIATRIC: +difficulty sleeping  HEME/LYMPH: No easy bleeding    Vital Signs Last 24 Hrs  T(C): 37.1 (2018 05:34), Max: 37.4 (2018 22:55)  T(F): 98.7 (2018 05:34), Max: 99.3 (2018 22:55)  HR: 65 (2018 05:34) (65 - 75)  BP: 130/84 (2018 05:34) (113/57 - 141/81)  BP(mean): --  RR: 18 (2018 05:34) (18 - 18)  SpO2: 96% (2018 05:34) (95% - 97%)    PHYSICAL EXAM:  GENERAL: NAD  HEAD:  Atraumatic  EYES: EOMI  ENMT: Moist mucous membranes  NERVOUS SYSTEM:  Alert & Oriented X3, Good concentration; MAEW, SILT distal extremity, ambulates without difficulty  CHEST/LUNG: Clear to auscultation bilaterally  HEART: RRR, no m/r/g  ABDOMEN: +BS, soft, non-tender  EXTREMITIES:  Warm, no edema  SKIN: No rashes or lesions    Consultant(s) Notes Reviewed: pulm    LABS:                        8.7    4.89  )-----------( 240      ( 2018 07:02 )             27.4         135  |  98  |  18  ----------------------------<  174<H>  3.4<L>   |  25  |  0.66    Ca    9.3      2018 08:08  Phos  4.1       Mg     1.7         TPro  7.2  /  Alb  3.1<L>  /  TBili  0.3  /  DBili  x   /  AST  22  /  ALT  28  /  AlkPhos  142<H>      PT/INR - ( 2018 07:02 )   PT: 20.5 sec;   INR: 1.79 ratio         PTT - ( 2018 07:02 )  PTT:80.2 sec  Urinalysis Basic - ( 2018 06:53 )    Color: Nadira / Appearance: Turbid / S.030 / pH: x  Gluc: x / Ketone: Negative  / Bili: Negative / Urobili: Negative mg/dL   Blood: x / Protein: 100 mg/dL / Nitrite: Negative   Leuk Esterase: Negative / RBC: 3 /HPF / WBC 2 /HPF   Sq Epi: x / Non Sq Epi: 12 /HPF / Bacteria: Negative

## 2018-01-02 NOTE — PROGRESS NOTE ADULT - ATTENDING COMMENTS
I saw and evaluated the patient along with the medical housestaff team at the bedside.  HER reviewed.  Case discussed in detail.  Ms. Rader is a 66 y/o woman with amyloidosis, known lung involvement.  Sustained right lung pneumothorax three days ago, likely in area of amyloid deposition.  Chest tube placed.  Suspected infiltrate of lung noted and patient started on antibiotics.  She reports feeling much improved today.  Afebrile, no shortness of breath but a little upper right back pain with deep inspiration.  Lungs are clear.  Agree with plan for chest tube clamping as lung reinflated for more than a day.  Consider chest tube removal (pulmonary) if does well off suction.  Agree with oral antibiotics for short course and ambulation.  Discharge planning.

## 2018-01-03 ENCOUNTER — TRANSCRIPTION ENCOUNTER (OUTPATIENT)
Age: 68
End: 2018-01-03

## 2018-01-03 VITALS
DIASTOLIC BLOOD PRESSURE: 85 MMHG | RESPIRATION RATE: 18 BRPM | OXYGEN SATURATION: 98 % | TEMPERATURE: 98 F | SYSTOLIC BLOOD PRESSURE: 124 MMHG | HEART RATE: 74 BPM

## 2018-01-03 LAB
CULTURE RESULTS: NO GROWTH — SIGNIFICANT CHANGE UP
FERRITIN SERPL-MCNC: 491 NG/ML — HIGH (ref 15–150)
HAPTOGLOB SERPL-MCNC: 370 MG/DL — HIGH (ref 34–200)
HCT VFR BLD CALC: 27.9 % — LOW (ref 34.5–45)
HGB BLD-MCNC: 8.9 G/DL — LOW (ref 11.5–15.5)
IRON SATN MFR SERPL: 23 % — SIGNIFICANT CHANGE UP (ref 14–50)
IRON SATN MFR SERPL: 43 UG/DL — SIGNIFICANT CHANGE UP (ref 30–160)
LDH SERPL L TO P-CCNC: 209 U/L — SIGNIFICANT CHANGE UP (ref 50–242)
MCHC RBC-ENTMCNC: 31.9 GM/DL — LOW (ref 32–36)
MCHC RBC-ENTMCNC: 32.6 PG — SIGNIFICANT CHANGE UP (ref 27–34)
MCV RBC AUTO: 102.2 FL — HIGH (ref 80–100)
PLATELET # BLD AUTO: 263 K/UL — SIGNIFICANT CHANGE UP (ref 150–400)
RBC # BLD: 2.73 M/UL — LOW (ref 3.8–5.2)
RBC # BLD: 2.73 M/UL — LOW (ref 3.8–5.2)
RBC # FLD: 15.7 % — HIGH (ref 10.3–14.5)
RETICS #: 22.4 K/UL — LOW (ref 25–125)
RETICS/RBC NFR: 0.8 % — SIGNIFICANT CHANGE UP (ref 0.5–2.5)
SPECIMEN SOURCE: SIGNIFICANT CHANGE UP
TIBC SERPL-MCNC: 186 UG/DL — LOW (ref 220–430)
UIBC SERPL-MCNC: 143 UG/DL — SIGNIFICANT CHANGE UP (ref 110–370)
WBC # BLD: 5.96 K/UL — SIGNIFICANT CHANGE UP (ref 3.8–10.5)
WBC # FLD AUTO: 5.96 K/UL — SIGNIFICANT CHANGE UP (ref 3.8–10.5)

## 2018-01-03 PROCEDURE — 96374 THER/PROPH/DIAG INJ IV PUSH: CPT | Mod: XU

## 2018-01-03 PROCEDURE — 71045 X-RAY EXAM CHEST 1 VIEW: CPT | Mod: 26,76

## 2018-01-03 PROCEDURE — 93005 ELECTROCARDIOGRAM TRACING: CPT | Mod: XU

## 2018-01-03 PROCEDURE — 82435 ASSAY OF BLOOD CHLORIDE: CPT

## 2018-01-03 PROCEDURE — 71045 X-RAY EXAM CHEST 1 VIEW: CPT

## 2018-01-03 PROCEDURE — 80053 COMPREHEN METABOLIC PANEL: CPT

## 2018-01-03 PROCEDURE — 83550 IRON BINDING TEST: CPT

## 2018-01-03 PROCEDURE — 87486 CHLMYD PNEUM DNA AMP PROBE: CPT

## 2018-01-03 PROCEDURE — 93010 ELECTROCARDIOGRAM REPORT: CPT

## 2018-01-03 PROCEDURE — 84100 ASSAY OF PHOSPHORUS: CPT

## 2018-01-03 PROCEDURE — 83010 ASSAY OF HAPTOGLOBIN QUANT: CPT

## 2018-01-03 PROCEDURE — 87040 BLOOD CULTURE FOR BACTERIA: CPT

## 2018-01-03 PROCEDURE — 87633 RESP VIRUS 12-25 TARGETS: CPT

## 2018-01-03 PROCEDURE — 87086 URINE CULTURE/COLONY COUNT: CPT

## 2018-01-03 PROCEDURE — 71250 CT THORAX DX C-: CPT

## 2018-01-03 PROCEDURE — 84132 ASSAY OF SERUM POTASSIUM: CPT

## 2018-01-03 PROCEDURE — 82728 ASSAY OF FERRITIN: CPT

## 2018-01-03 PROCEDURE — 82330 ASSAY OF CALCIUM: CPT

## 2018-01-03 PROCEDURE — 84295 ASSAY OF SERUM SODIUM: CPT

## 2018-01-03 PROCEDURE — 86900 BLOOD TYPING SEROLOGIC ABO: CPT

## 2018-01-03 PROCEDURE — 85045 AUTOMATED RETICULOCYTE COUNT: CPT

## 2018-01-03 PROCEDURE — 86901 BLOOD TYPING SEROLOGIC RH(D): CPT

## 2018-01-03 PROCEDURE — 87798 DETECT AGENT NOS DNA AMP: CPT

## 2018-01-03 PROCEDURE — 99285 EMERGENCY DEPT VISIT HI MDM: CPT | Mod: 25

## 2018-01-03 PROCEDURE — 87581 M.PNEUMON DNA AMP PROBE: CPT

## 2018-01-03 PROCEDURE — 82947 ASSAY GLUCOSE BLOOD QUANT: CPT

## 2018-01-03 PROCEDURE — 32556 INSERT CATH PLEURA W/O IMAGE: CPT

## 2018-01-03 PROCEDURE — 86850 RBC ANTIBODY SCREEN: CPT

## 2018-01-03 PROCEDURE — 87449 NOS EACH ORGANISM AG IA: CPT

## 2018-01-03 PROCEDURE — 81001 URINALYSIS AUTO W/SCOPE: CPT

## 2018-01-03 PROCEDURE — 83605 ASSAY OF LACTIC ACID: CPT

## 2018-01-03 PROCEDURE — 83615 LACTATE (LD) (LDH) ENZYME: CPT

## 2018-01-03 PROCEDURE — 85027 COMPLETE CBC AUTOMATED: CPT

## 2018-01-03 PROCEDURE — 99232 SBSQ HOSP IP/OBS MODERATE 35: CPT | Mod: GC

## 2018-01-03 PROCEDURE — 85730 THROMBOPLASTIN TIME PARTIAL: CPT

## 2018-01-03 PROCEDURE — 80048 BASIC METABOLIC PNL TOTAL CA: CPT

## 2018-01-03 PROCEDURE — 85014 HEMATOCRIT: CPT

## 2018-01-03 PROCEDURE — 85610 PROTHROMBIN TIME: CPT

## 2018-01-03 PROCEDURE — 82803 BLOOD GASES ANY COMBINATION: CPT

## 2018-01-03 PROCEDURE — 83735 ASSAY OF MAGNESIUM: CPT

## 2018-01-03 RX ADMIN — Medication 80 MILLIGRAM(S): at 08:23

## 2018-01-03 RX ADMIN — Medication 2000 UNIT(S): at 12:49

## 2018-01-03 RX ADMIN — Medication 100 MILLIGRAM(S): at 13:45

## 2018-01-03 RX ADMIN — Medication 1 TABLET(S): at 13:45

## 2018-01-03 NOTE — DISCHARGE NOTE ADULT - CARE PROVIDER_API CALL
Kylee Ellis), Internal Medicine  [address]  [address]  [place] [address]  Phone: [phone]  Fax: [phone]    Dimitry Ruiz), Critical Care Medicine; Internal Medicine; Pulmonary Disease; Sleep Medicine  [address]  [address]  [place] [address]  Phone: [phone]  Fax: [phone]    Ciro Cano), Hematology; Internal Medicine; Medical Oncology  [address]  [address]  [place] [address]  Phone: [phone]  Fax: [phone]

## 2018-01-03 NOTE — DISCHARGE NOTE ADULT - PATIENT PORTAL LINK FT
“You can access the Follow[employer] Patient Portal, offered by [facility], by registering with the following website: [email or web address]”

## 2018-01-03 NOTE — PROGRESS NOTE ADULT - SUBJECTIVE AND OBJECTIVE BOX
CONTACT INFO  Ousmane Grubbs M.D., PGY-1  Pager: [facility]- [phone], [facility]- [phone]    Mon-Fri: pager covered by day team 7am-7pm;   ***Academic conferences M-F 8am-9am & 12pm-1pm- page ONLY if URGENT or if Consultant  /[place]: see chart, primary physician assigned available 7am-12pm  Sat/Loya Cross Coverage 12pm-7pm: NS- page [phone] for Team1-4, [facility]- pager forwarded to covering Resident  For Night coverage 7pm-7am: NS- page [phone] Team1-3, page [phone] Team4 & Care Model    SALVADOR COURTNEY  67y  Female      Patient is a 67y old  Female who presents with a chief complaint of SOB, rhinorrhea, cough (01 Jan 2018 02:39)      INTERVAL HPI/OVERNIGHT EVENTS: NAEON. Patient with some mild chest discomfort following chest tube clamping yesterday PM, repeat CXR showed no pneumothorax, plan to repeat today. Antibiotics dc'd yesterday. Patient with cough relieved by nasal spray overnight.     REVIEW OF SYSTEMS:  CONSTITUTIONAL: No fever  RESPIRATORY: +Cough; No shortness of breath  CARDIOVASCULAR: No chest pain  GASTROINTESTINAL: No abdominal or epigastric pain. No diarrhea  GENITOURINARY: No dysuria  NEUROLOGICAL: No headaches  MUSCULOSKELETAL: No joint pain  SKIN: No itching  PSYCHIATRIC: No difficulty sleeping  HEME/LYMPH: No easy bleeding    Vital Signs Last 24 Hrs  T(C): 36.9 (03 Jan 2018 05:21), Max: 37.3 (02 Jan 2018 20:57)  T(F): 98.4 (03 Jan 2018 05:21), Max: 99.1 (02 Jan 2018 20:57)  HR: 60 (03 Jan 2018 05:21) (60 - 72)  BP: 123/80 (03 Jan 2018 05:21) (108/70 - 141/76)  BP(mean): --  RR: 18 (03 Jan 2018 05:21) (16 - 18)  SpO2: 95% (03 Jan 2018 05:21) (95% - 97%)    PHYSICAL EXAM:  GENERAL: NAD  HEAD:  Atraumatic  ENMT: Moist mucous membranes  NERVOUS SYSTEM:  Alert & Oriented X3. MAEW, SILT distal extremities  CHEST/LUNG: Clear to auscultation bilaterally; lung sounds symmetric  HEART: RRR. no m/r/g  ABDOMEN: +BSx4, non-tender, soft  EXTREMITIES: Warm, no edema  SKIN: No rashes    Consultant(s) Notes Reviewed:  pulm    LABS:                        8.7    4.89  )-----------( 240      ( 02 Jan 2018 07:02 )             27.4     01-02    141  |  103  |  12  ----------------------------<  99  3.5   |  26  |  0.55    Ca    9.1      02 Jan 2018 07:59  Phos  3.4     01-02  Mg     1.8     01-02    TPro  7.2  /  Alb  3.1<L>  /  TBili  0.3  /  DBili  x   /  AST  22  /  ALT  28  /  AlkPhos  142<H>  01-01    PT/INR - ( 02 Jan 2018 07:02 )   PT: 20.5 sec;   INR: 1.79 ratio         PTT - ( 02 Jan 2018 07:02 )  PTT:80.2 sec      CAPILLARY BLOOD GLUCOSE          RADIOLOGY & ADDITIONAL TESTS:  < from: Xray Chest 1 View AP- PORTABLE-Urgent (01.02.18 @ 15:48) >  IMPRESSION:     Trace left pleural effusion.    No pneumothorax.    < end of copied text >    Imaging Personally Reviewed:  [x] YES  [ ] NO

## 2018-01-03 NOTE — DISCHARGE NOTE ADULT - MEDICATION SUMMARY - MEDICATIONS TO TAKE
I will START or STAY ON the medications listed below when I get home from the hospital:    sotalol 80 mg oral tablet  -- Indication: For Atrial fibrillation    Xarelto 20 mg oral tablet  -- 1 tab(s) by mouth once a day (in the evening)  -- Indication: For Atrial fibrillation    doxycycline monohydrate 100 mg oral capsule  -- 1 cap(s) by mouth every 12 hours  -- Indication: For Pneumonia of right lower lobe due to infectious organism    Breo Ellipta 200 mcg-25 mcg/inh inhalation powder  -- 1 puff(s) inhaled once a day  -- Indication: For Asthma    amoxicillin-clavulanate 875 mg-125 mg oral tablet  -- 1 tab(s) by mouth every 12 hours  -- Indication: For Pneumonia of right lower lobe due to infectious organism    Vitamin D3 2000 intl units oral capsule  -- 1 cap(s) by mouth once a day  -- Indication: For Nutrition    Vitamin B12 2500 mcg sublingual tablet  -- 1 tab(s) under tongue once a day  -- Indication: For Nutrition

## 2018-01-03 NOTE — DISCHARGE NOTE ADULT - CARE PROVIDERS DIRECT ADDRESSES
[email or web address],DirectAddress_Unknown,DirectAddress_Unknown

## 2018-01-03 NOTE — DISCHARGE NOTE ADULT - CARE PLAN
Principal Discharge DX:	Pneumothorax on right  Goal:	Resolution of pneumothorax  Instructions for follow-up, activity and diet:	During this admission you were diagnosed with a pneumothorax (collapsed lung). A tube was placed to reinflate your lung and several chest x-rays were obtained to confirm that this was successful and that the lung had reinflated. Please follow up with pulmonology following your discharge for recommendations regarding your recent pneumothorax. If you have increasing difficulty breathing or chest pain please call your doctor and/or go to an emergency department for evaluation for a possible recurrence of your pneumothorax.  Secondary Diagnosis:	Pneumonia of right lower lobe due to infectious organism  Instructions for follow-up, activity and diet:	During this admission you were diagnosed with pneumonia, an infection of your lung. You were treated with IV antibiotics and your symptoms improved. Please continue taking antibiotics as prescribed following your discharge and follow up with your primary doctor and pulmonologist for further recommendations. Principal Discharge DX:	Pneumothorax on right  Goal:	Resolution of pneumothorax  Instructions for follow-up, activity and diet:	During this admission you were diagnosed with a pneumothorax (collapsed lung). A tube was placed to reinflate your lung and several chest x-rays were obtained to confirm that this was successful and that the lung had reinflated. Please follow up with pulmonology following your discharge for recommendations regarding your recent pneumothorax. If you have increasing difficulty breathing or chest pain please call your doctor and/or go to an emergency department for evaluation for a possible recurrence of your pneumothorax.  Secondary Diagnosis:	Pneumonia of right lower lobe due to infectious organism  Goal:	Resolution  Instructions for follow-up, activity and diet:	During this admission you were diagnosed with pneumonia, an infection of your lung. You were treated with IV antibiotics and your symptoms improved. Please continue taking antibiotics as prescribed following your discharge, through Friday Jan 5th, and follow up with your primary doctor and pulmonologist for further recommendations.

## 2018-01-03 NOTE — PROGRESS NOTE ADULT - PROBLEM SELECTOR PLAN 4
Patient takes IVIG q8 weeks, next due 1/9/18  - F/U Heme recs
Patient takes IVIG q8 weeks, next due 1/9/18  - Patient will continue as scheduled once completing abx as an oupatient
Patient takes IVIG q8 weeks, next due 1/9/18  - Patient will continue as scheduled and follow up with hematology

## 2018-01-03 NOTE — DISCHARGE NOTE ADULT - PLAN OF CARE
Resolution of pneumothorax During this admission you were diagnosed with a pneumothorax (collapsed lung). A tube was placed to reinflate your lung and several chest x-rays were obtained to confirm that this was successful and that the lung had reinflated. Please follow up with pulmonology following your discharge for recommendations regarding your recent pneumothorax. If you have increasing difficulty breathing or chest pain please call your doctor and/or go to an emergency department for evaluation for a possible recurrence of your pneumothorax. During this admission you were diagnosed with pneumonia, an infection of your lung. You were treated with IV antibiotics and your symptoms improved. Please continue taking antibiotics as prescribed following your discharge and follow up with your primary doctor and pulmonologist for further recommendations. Resolution During this admission you were diagnosed with pneumonia, an infection of your lung. You were treated with IV antibiotics and your symptoms improved. Please continue taking antibiotics as prescribed following your discharge, through Friday Jan 5th, and follow up with your primary doctor and pulmonologist for further recommendations.

## 2018-01-03 NOTE — DISCHARGE NOTE ADULT - NS TRANSFER PATIENT BELONGINGS
Jewelry/Money (specify)/ipad/ $50/Clothing/Cell Phone/PDA (specify)/Wrist Watch/Electronic Device (specify)

## 2018-01-03 NOTE — PROGRESS NOTE ADULT - PROBLEM SELECTOR PLAN 3
On outpatient Revlimid, due to start cycle 12/31  - Discussed with Dr. Cano (outpt [facility]) and pt may receive revlimid. Awaiting medication from home
On outpatient Revlimid, due to start cycle 12/31  - f/u heme recs regarding Revlimid (family to bring from home)
On outpatient Revlimid, due to start cycle 12/31  - Patient will continue medication as outpatient on discharge

## 2018-01-03 NOTE — PROGRESS NOTE ADULT - PROBLEM SELECTOR PLAN 1
S/P Chest tube placement with improvement in respiratory symptoms  - Chest x-ray yesterday demonstrated reinflated lung.  - Repeat this AM and discontinue suction if lung appears inflated
S/P Chest tube placement with improvement in respiratory symptoms  - Repeat CXR this AM
S/P Chest tube placement with improvement in respiratory symptoms  - Repeat chest x-ray this AM as chest tube has been clamped; will plan to remove chest tube if no PTX

## 2018-01-03 NOTE — PROGRESS NOTE ADULT - SUBJECTIVE AND OBJECTIVE BOX
PULMONARY PROGRESS NOTE    SALVADOR COURTNEY  MRN-[id number]    Patient is a 67y old  Female who presents with a chief complaint of SOB, rhinorrhea, cough (01 Jan 2018 02:39)      HPI:  -still some chest discomfort at tube site, no shortness of breath, productive cough.    ROS:   -afebrile  -no N/V/D    MEDICATIONS  (STANDING):  cholecalciferol 2000 Unit(s) Oral daily  cyanocobalamin 1000 MICROGram(s) Oral daily  rivaroxaban 20 milliGRAM(s) Oral every 24 hours  sotalol 80 milliGRAM(s) Oral two times a day    MEDICATIONS  (PRN):  acetaminophen   Tablet. 650 milliGRAM(s) Oral every 6 hours PRN Mild Pain (1 - 3)  morphine  - Injectable 2 milliGRAM(s) IV Push every 6 hours PRN Moderate Pain (4 - 6)  morphine  - Injectable 4 milliGRAM(s) IV Push every 6 hours PRN Severe Pain (7 - 10)  sodium chloride 0.65% Nasal 1 Spray(s) Both Nostrils every 4 hours PRN Nasal Congestion    EXAM:  Vital Signs Last 24 Hrs  T(C): 37.1 (03 Jan 2018 08:22), Max: 37.3 (02 Jan 2018 20:57)  T(F): 98.8 (03 Jan 2018 08:22), Max: 99.1 (02 Jan 2018 20:57)  HR: 68 (03 Jan 2018 08:22) (60 - 72)  BP: 110/73 (03 Jan 2018 08:22) (108/70 - 141/76)  BP(mean): --  RR: 18 (03 Jan 2018 08:22) (18 - 18)  SpO2: 95% (03 Jan 2018 08:22) (95% - 97%)    GENERAL: The patient is awake and alert in no apparent distress.     SKIN: Warm, dry, no rashes    LUNGS: Clear to auscultation without wheezing, rales or rhonchi; respirations unlabored    HEART: Regular rate and rhythm without murmur.    ABDOMEN: +BS, Soft, Nontender    EXTREMITIES: No clubbing, cyanosis, edema    LABS/IMGAING: reviewed                          8.9    5.96  )-----------( 263      ( 03 Jan 2018 08:46 )             27.9     01-02    141  |  103  |  12  ----------------------------<  99  3.5   |  26  |  0.55    Ca    9.1      02 Jan 2018 07:59  Phos  3.4     01-02  Mg     1.8     01-02        < from: Xray Chest 1 View AP- PORTABLE-Urgent (01.02.18 @ 15:48) >  IMPRESSION:     Trace left pleural effusion.    No pneumothorax.    < end of copied text >    PROBLEM LIST:  67y Female with HEALTH ISSUES - PROBLEM Dx:  Pneumothorax--resolved  Pneumonia  Chronic atrial fibrillation  Myeloma associated amyloidosis    RECS:  -PTX: resolved, tolerated tube being clamped overnight.  will dc chest tube this AM, repeat cxr this afternoon, if stable ok to dc home, follow up with Dr. Ruiz in the office tomorrow.  -PNA: continue antibiotics for 5-7 days  -supplemental O2 --would wean off now.  -Pain control    Nancy Delarosa MD  [phone]

## 2018-01-03 NOTE — DISCHARGE NOTE ADULT - ADDITIONAL INSTRUCTIONS
Please follow up with Dr. Ruiz for recommendations regarding your amyloidosis, pneumothorax and pneumonia.  Please follow up with Dr. Ellis for recommendations regarding your pneumonia.

## 2018-01-03 NOTE — DISCHARGE NOTE ADULT - HOSPITAL COURSE
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 In the ED, CXR revealed R PTX confirmed on CT scan as well as RLL opacity concerning for infection vs. inflammatory process. Chest tube placed by ED with subsequent reinflation of the R lung demonstrated on chest x-ray. The chest tube was maintained to suction overnight before being clamped. The next morning the chest x-ray was repeated and the chest tube was removed as the lung had remained inflated. The patient was started on ceftriaxone and azithromycin in the ED for presumed CAP. She received two doses of these medications and remained afebrile with normal WBC count and very mild respiratory symptoms. As she was clinically improved she was transitioned to PO augmentin and doxycycline to complete a total of five days as an outpatient. She was discharged in stable medical condition for follow up with pulmonology and hematology. 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 In the ED, CXR revealed R PTX confirmed on CT scan as well as RLL opacity concerning for infection vs. inflammatory process. Chest tube placed by ED with subsequent reinflation of the R lung demonstrated on chest x-ray. The chest tube was maintained to suction overnight before being clamped. The next morning the chest x-ray was repeated and the chest tube was removed as the lung had remained inflated. The patient was started on ceftriaxone and azithromycin in the ED for presumed CAP. She received two doses of these medications and remained afebrile with normal WBC count and very mild respiratory symptoms. As she was clinically improved she was transitioned to PO antibiotics to complete a total of five days as an outpatient. Augmentin and doxycycline were selected to minimize interactions with her sotalol. She was discharged in stable medical condition for follow up with pulmonology and hematology. 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 In the ED, CXR revealed R PTX confirmed on CT scan as well as RLL opacity concerning for infection vs. inflammatory process. Chest tube placed by ED with subsequent reinflation of the R lung demonstrated on chest x-ray. The chest tube was maintained to suction before being clamped and eventually removed. XRay following removal showed no recurrence of pneumothorax. The patient was started on ceftriaxone and azithromycin in the ED for presumed CAP. She received two doses of these medications and remained afebrile with normal WBC count and very mild respiratory symptoms. As she was clinically improved she was transitioned to PO antibiotics to complete a total of five days as an outpatient. Augmentin and doxycycline were selected to minimize interactions (QTc) with her sotalol. She was discharged in stable medical condition for follow up with pulmonology and hematology.

## 2018-01-03 NOTE — PROGRESS NOTE ADULT - PROBLEM SELECTOR PLAN 2
Afebrile overnight, WBC normal range. RVP -, Cultures were drawn after antibiotics  -Day 2 ceftriaxone, azithromycin planned 5 day course for CAP  - F/U cx, urine legionella to narrow abx
Afebrile overnight, WBC normal range. RVP -, Cultures were drawn after antibiotics  -Day 3 ceftriaxone, azithromycin planned 5 day course for CAP  - Legionella -, will DC azithromycin  - Cultures pending
Afebrile, mild cough relieved by nasal spray.   - Abx discontinued overnight as patient clinically significantly improved, afebrile and normal WBC  - Legionella -  - Cultures remain no growth

## 2018-01-03 NOTE — PROGRESS NOTE ADULT - ATTENDING COMMENTS
I saw and evaluated the patient along with the medical housestaff at the bedside. HER reviewed.  Case discussed in detail. Patient denies complaints this morning.  Had a brief episode of chest pain yesterday following the chest tube clamping.  This resolved quickly without intervention and she now ascribes it to nerves.  Chest xray at that time showed no pneumothorax or infiltrate. Exam this morning as documented- clear lungs.  Agree with removing chest tube today and monitoring for a few hours.  If remains stable, D/C to home for outpatient follow-up.

## 2018-01-06 LAB
CULTURE RESULTS: SIGNIFICANT CHANGE UP
CULTURE RESULTS: SIGNIFICANT CHANGE UP
SPECIMEN SOURCE: SIGNIFICANT CHANGE UP
SPECIMEN SOURCE: SIGNIFICANT CHANGE UP

## 2018-01-10 ENCOUNTER — APPOINTMENT (OUTPATIENT)
Dept: INFUSION THERAPY | Facility: HOSPITAL | Age: 68
End: 2018-01-10

## 2018-01-10 LAB — ACID FAST STN STL: NORMAL

## 2018-01-11 ENCOUNTER — APPOINTMENT (OUTPATIENT)
Dept: INFUSION THERAPY | Facility: HOSPITAL | Age: 68
End: 2018-01-11

## 2018-01-11 PROBLEM — C90.00 MULTIPLE MYELOMA NOT HAVING ACHIEVED REMISSION: Chronic | Status: ACTIVE | Noted: 2017-12-31

## 2018-01-22 ENCOUNTER — APPOINTMENT (OUTPATIENT)
Dept: INFUSION THERAPY | Facility: HOSPITAL | Age: 68
End: 2018-01-22

## 2018-01-23 ENCOUNTER — APPOINTMENT (OUTPATIENT)
Dept: INFUSION THERAPY | Facility: HOSPITAL | Age: 68
End: 2018-01-23

## 2018-01-23 DIAGNOSIS — D80.1 NONFAMILIAL HYPOGAMMAGLOBULINEMIA: ICD-10-CM

## 2018-02-08 ENCOUNTER — MESSAGE (OUTPATIENT)
Age: 68
End: 2018-02-08

## 2018-02-14 LAB
C DIFF TOX GENS STL QL NAA+PROBE: NORMAL
CDIFF BY PCR: NOT DETECTED

## 2018-02-22 ENCOUNTER — OUTPATIENT (OUTPATIENT)
Dept: OUTPATIENT SERVICES | Facility: HOSPITAL | Age: 68
LOS: 1 days | Discharge: HOME | End: 2018-02-22

## 2018-02-22 DIAGNOSIS — L98.5 MUCINOSIS OF THE SKIN: ICD-10-CM

## 2018-02-22 DIAGNOSIS — D47.2 MONOCLONAL GAMMOPATHY: ICD-10-CM

## 2018-02-28 ENCOUNTER — APPOINTMENT (OUTPATIENT)
Dept: HEMATOLOGY ONCOLOGY | Facility: CLINIC | Age: 68
End: 2018-02-28

## 2018-03-14 ENCOUNTER — APPOINTMENT (OUTPATIENT)
Dept: INFUSION THERAPY | Facility: HOSPITAL | Age: 68
End: 2018-03-14

## 2018-03-15 ENCOUNTER — APPOINTMENT (OUTPATIENT)
Dept: INFUSION THERAPY | Facility: HOSPITAL | Age: 68
End: 2018-03-15

## 2018-04-25 ENCOUNTER — APPOINTMENT (OUTPATIENT)
Dept: MRI IMAGING | Facility: CLINIC | Age: 68
End: 2018-04-25

## 2018-04-25 ENCOUNTER — OUTPATIENT (OUTPATIENT)
Dept: OUTPATIENT SERVICES | Facility: HOSPITAL | Age: 68
LOS: 1 days | End: 2018-04-25
Payer: MEDICARE

## 2018-04-25 ENCOUNTER — APPOINTMENT (OUTPATIENT)
Dept: ULTRASOUND IMAGING | Facility: CLINIC | Age: 68
End: 2018-04-25

## 2018-04-25 DIAGNOSIS — Z00.8 ENCOUNTER FOR OTHER GENERAL EXAMINATION: ICD-10-CM

## 2018-04-25 PROCEDURE — 93971 EXTREMITY STUDY: CPT

## 2018-04-25 PROCEDURE — 72195 MRI PELVIS W/O DYE: CPT

## 2018-04-25 PROCEDURE — 93971 EXTREMITY STUDY: CPT | Mod: 26,LT

## 2018-04-25 PROCEDURE — 72148 MRI LUMBAR SPINE W/O DYE: CPT

## 2018-04-25 PROCEDURE — 72195 MRI PELVIS W/O DYE: CPT | Mod: 26

## 2018-04-25 PROCEDURE — 72148 MRI LUMBAR SPINE W/O DYE: CPT | Mod: 26

## 2018-05-01 ENCOUNTER — APPOINTMENT (OUTPATIENT)
Dept: INFUSION THERAPY | Facility: HOSPITAL | Age: 68
End: 2018-05-01

## 2018-05-02 ENCOUNTER — APPOINTMENT (OUTPATIENT)
Dept: INFUSION THERAPY | Facility: HOSPITAL | Age: 68
End: 2018-05-02

## 2018-05-03 ENCOUNTER — TRANSCRIPTION ENCOUNTER (OUTPATIENT)
Age: 68
End: 2018-05-03

## 2018-05-18 ENCOUNTER — LABORATORY RESULT (OUTPATIENT)
Age: 68
End: 2018-05-18

## 2018-06-08 ENCOUNTER — APPOINTMENT (OUTPATIENT)
Dept: UROLOGY | Facility: CLINIC | Age: 68
End: 2018-06-08
Payer: MEDICARE

## 2018-06-08 PROCEDURE — 99213 OFFICE O/P EST LOW 20 MIN: CPT

## 2018-06-08 RX ORDER — VANCOMYCIN HYDROCHLORIDE 125 MG/1
125 CAPSULE ORAL
Qty: 56 | Refills: 1 | Status: DISCONTINUED | COMMUNITY
Start: 2017-11-21 | End: 2018-06-08

## 2018-06-08 RX ORDER — CEPHALEXIN 500 MG/1
500 CAPSULE ORAL 3 TIMES DAILY
Qty: 21 | Refills: 0 | Status: DISCONTINUED | COMMUNITY
Start: 2018-05-23 | End: 2018-06-08

## 2018-06-12 LAB
APPEARANCE: ABNORMAL
BACTERIA UR CULT: ABNORMAL
BACTERIA: ABNORMAL
BILIRUBIN URINE: ABNORMAL
BLOOD URINE: ABNORMAL
COLOR: ABNORMAL
GLUCOSE QUALITATIVE U: NEGATIVE MG/DL
HYALINE CASTS: 0 /LPF
KETONES URINE: NEGATIVE
LEUKOCYTE ESTERASE URINE: ABNORMAL
MICROSCOPIC-UA: NORMAL
NITRITE URINE: POSITIVE
PH URINE: 5.5
PROTEIN URINE: 100 MG/DL
RED BLOOD CELLS URINE: 17 /HPF
SPECIFIC GRAVITY URINE: 1.02
SQUAMOUS EPITHELIAL CELLS: 1 /HPF
UROBILINOGEN URINE: 1 MG/DL
WHITE BLOOD CELLS URINE: 216 /HPF

## 2018-06-13 ENCOUNTER — APPOINTMENT (OUTPATIENT)
Dept: UROLOGY | Facility: CLINIC | Age: 68
End: 2018-06-13

## 2018-06-20 ENCOUNTER — LABORATORY RESULT (OUTPATIENT)
Age: 68
End: 2018-06-20

## 2018-06-20 ENCOUNTER — MESSAGE (OUTPATIENT)
Age: 68
End: 2018-06-20

## 2018-06-22 ENCOUNTER — OUTPATIENT (OUTPATIENT)
Dept: OUTPATIENT SERVICES | Facility: HOSPITAL | Age: 68
LOS: 1 days | Discharge: HOME | End: 2018-06-22

## 2018-06-22 DIAGNOSIS — D47.2 MONOCLONAL GAMMOPATHY: ICD-10-CM

## 2018-06-22 DIAGNOSIS — L98.5 MUCINOSIS OF THE SKIN: ICD-10-CM

## 2018-06-26 ENCOUNTER — APPOINTMENT (OUTPATIENT)
Dept: HEMATOLOGY ONCOLOGY | Facility: CLINIC | Age: 68
End: 2018-06-26
Payer: MEDICARE

## 2018-06-26 VITALS
BODY MASS INDEX: 29.64 KG/M2 | RESPIRATION RATE: 16 BRPM | DIASTOLIC BLOOD PRESSURE: 82 MMHG | SYSTOLIC BLOOD PRESSURE: 124 MMHG | TEMPERATURE: 97.4 F | OXYGEN SATURATION: 100 % | WEIGHT: 189.26 LBS | HEART RATE: 60 BPM

## 2018-06-26 PROCEDURE — 99214 OFFICE O/P EST MOD 30 MIN: CPT

## 2018-06-26 RX ORDER — PREGABALIN 50 MG/1
50 CAPSULE ORAL
Refills: 0 | Status: DISCONTINUED | COMMUNITY
Start: 2017-09-05 | End: 2018-06-26

## 2018-06-26 RX ORDER — FOSFOMYCIN TROMETHAMINE 3 G/1
3 POWDER ORAL
Qty: 2 | Refills: 0 | Status: DISCONTINUED | COMMUNITY
Start: 2018-06-08 | End: 2018-06-26

## 2018-06-26 RX ORDER — FUROSEMIDE 20 MG/1
20 TABLET ORAL DAILY
Qty: 30 | Refills: 0 | Status: DISCONTINUED | COMMUNITY
Start: 2017-06-09 | End: 2018-06-26

## 2018-06-26 RX ORDER — LENALIDOMIDE 10 MG/1
10 CAPSULE ORAL
Refills: 0 | Status: DISCONTINUED | COMMUNITY
End: 2018-06-26

## 2018-07-07 ENCOUNTER — EMERGENCY (EMERGENCY)
Facility: HOSPITAL | Age: 68
LOS: 1 days | Discharge: HOME | End: 2018-07-07
Attending: EMERGENCY MEDICINE
Payer: MEDICARE

## 2018-07-07 ENCOUNTER — TRANSCRIPTION ENCOUNTER (OUTPATIENT)
Age: 68
End: 2018-07-07

## 2018-07-07 VITALS
OXYGEN SATURATION: 99 % | SYSTOLIC BLOOD PRESSURE: 108 MMHG | HEART RATE: 81 BPM | TEMPERATURE: 98 F | WEIGHT: 188.05 LBS | DIASTOLIC BLOOD PRESSURE: 73 MMHG | RESPIRATION RATE: 18 BRPM | HEIGHT: 67 IN

## 2018-07-07 LAB
ALBUMIN SERPL ELPH-MCNC: 3.9 G/DL — SIGNIFICANT CHANGE UP (ref 3.3–5)
ALP SERPL-CCNC: 60 U/L — SIGNIFICANT CHANGE UP (ref 40–120)
ALT FLD-CCNC: 9 U/L — LOW (ref 10–45)
ANION GAP SERPL CALC-SCNC: 15 MMOL/L — SIGNIFICANT CHANGE UP (ref 5–17)
APPEARANCE UR: ABNORMAL
APTT BLD: 129.8 SEC — CRITICAL HIGH (ref 27.5–37.4)
APTT BLD: > 200 SEC (ref 27.5–37.4)
AST SERPL-CCNC: 17 U/L — SIGNIFICANT CHANGE UP (ref 10–40)
BACTERIA # UR AUTO: ABNORMAL /HPF
BASOPHILS # BLD AUTO: 0 K/UL — SIGNIFICANT CHANGE UP (ref 0–0.2)
BASOPHILS NFR BLD AUTO: 0.1 % — SIGNIFICANT CHANGE UP (ref 0–2)
BILIRUB SERPL-MCNC: 0.8 MG/DL — SIGNIFICANT CHANGE UP (ref 0.2–1.2)
BILIRUB UR-MCNC: NEGATIVE — SIGNIFICANT CHANGE UP
BUN SERPL-MCNC: 13 MG/DL — SIGNIFICANT CHANGE UP (ref 7–23)
CALCIUM SERPL-MCNC: 8.9 MG/DL — SIGNIFICANT CHANGE UP (ref 8.4–10.5)
CHLORIDE SERPL-SCNC: 99 MMOL/L — SIGNIFICANT CHANGE UP (ref 96–108)
CO2 SERPL-SCNC: 22 MMOL/L — SIGNIFICANT CHANGE UP (ref 22–31)
COLOR SPEC: YELLOW — SIGNIFICANT CHANGE UP
COMMENT - URINE: SIGNIFICANT CHANGE UP
COMMENT - URINE: SIGNIFICANT CHANGE UP
CREAT SERPL-MCNC: 0.55 MG/DL — SIGNIFICANT CHANGE UP (ref 0.5–1.3)
DIFF PNL FLD: ABNORMAL
EOSINOPHIL # BLD AUTO: 0 K/UL — SIGNIFICANT CHANGE UP (ref 0–0.5)
EOSINOPHIL NFR BLD AUTO: 0.4 % — SIGNIFICANT CHANGE UP (ref 0–6)
EPI CELLS # UR: SIGNIFICANT CHANGE UP /HPF
GLUCOSE SERPL-MCNC: 99 MG/DL — SIGNIFICANT CHANGE UP (ref 70–99)
GLUCOSE UR QL: NEGATIVE — SIGNIFICANT CHANGE UP
HCT VFR BLD CALC: 32.9 % — LOW (ref 34.5–45)
HGB BLD-MCNC: 11.3 G/DL — LOW (ref 11.5–15.5)
INR BLD: 1.14 RATIO — SIGNIFICANT CHANGE UP (ref 0.88–1.16)
INR BLD: 1.15 RATIO — SIGNIFICANT CHANGE UP (ref 0.88–1.16)
KETONES UR-MCNC: NEGATIVE — SIGNIFICANT CHANGE UP
LEUKOCYTE ESTERASE UR-ACNC: ABNORMAL
LYMPHOCYTES # BLD AUTO: 0.8 K/UL — LOW (ref 1–3.3)
LYMPHOCYTES # BLD AUTO: 7.7 % — LOW (ref 13–44)
MCHC RBC-ENTMCNC: 34.4 GM/DL — SIGNIFICANT CHANGE UP (ref 32–36)
MCHC RBC-ENTMCNC: 37.4 PG — HIGH (ref 27–34)
MCV RBC AUTO: 109 FL — HIGH (ref 80–100)
MONOCYTES # BLD AUTO: 0.6 K/UL — SIGNIFICANT CHANGE UP (ref 0–0.9)
MONOCYTES NFR BLD AUTO: 6.1 % — SIGNIFICANT CHANGE UP (ref 2–14)
NEUTROPHILS # BLD AUTO: 8.9 K/UL — HIGH (ref 1.8–7.4)
NEUTROPHILS NFR BLD AUTO: 85.6 % — HIGH (ref 43–77)
NITRITE UR-MCNC: POSITIVE
PH UR: 7.5 — SIGNIFICANT CHANGE UP (ref 5–8)
PLATELET # BLD AUTO: 205 K/UL — SIGNIFICANT CHANGE UP (ref 150–400)
POTASSIUM SERPL-MCNC: 3.7 MMOL/L — SIGNIFICANT CHANGE UP (ref 3.5–5.3)
POTASSIUM SERPL-SCNC: 3.7 MMOL/L — SIGNIFICANT CHANGE UP (ref 3.5–5.3)
PROT SERPL-MCNC: 7.5 G/DL — SIGNIFICANT CHANGE UP (ref 6–8.3)
PROT UR-MCNC: 150 MG/DL
PROTHROM AB SERPL-ACNC: 12.4 SEC — SIGNIFICANT CHANGE UP (ref 9.8–12.7)
PROTHROM AB SERPL-ACNC: 12.5 SEC — SIGNIFICANT CHANGE UP (ref 9.8–12.7)
RBC # BLD: 3.03 M/UL — LOW (ref 3.8–5.2)
RBC # FLD: 13.8 % — SIGNIFICANT CHANGE UP (ref 10.3–14.5)
RBC CASTS # UR COMP ASSIST: ABNORMAL /HPF (ref 0–2)
SODIUM SERPL-SCNC: 136 MMOL/L — SIGNIFICANT CHANGE UP (ref 135–145)
SP GR SPEC: 1.01 — SIGNIFICANT CHANGE UP (ref 1.01–1.02)
UROBILINOGEN FLD QL: NEGATIVE — SIGNIFICANT CHANGE UP
WBC # BLD: 10.3 K/UL — SIGNIFICANT CHANGE UP (ref 3.8–10.5)
WBC # FLD AUTO: 10.3 K/UL — SIGNIFICANT CHANGE UP (ref 3.8–10.5)
WBC UR QL: >50 /HPF (ref 0–5)

## 2018-07-07 PROCEDURE — 76770 US EXAM ABDO BACK WALL COMP: CPT

## 2018-07-07 PROCEDURE — 85610 PROTHROMBIN TIME: CPT

## 2018-07-07 PROCEDURE — 80053 COMPREHEN METABOLIC PANEL: CPT

## 2018-07-07 PROCEDURE — 87086 URINE CULTURE/COLONY COUNT: CPT

## 2018-07-07 PROCEDURE — 85730 THROMBOPLASTIN TIME PARTIAL: CPT

## 2018-07-07 PROCEDURE — 76770 US EXAM ABDO BACK WALL COMP: CPT | Mod: 26

## 2018-07-07 PROCEDURE — 85027 COMPLETE CBC AUTOMATED: CPT

## 2018-07-07 PROCEDURE — 99284 EMERGENCY DEPT VISIT MOD MDM: CPT

## 2018-07-07 PROCEDURE — 74176 CT ABD & PELVIS W/O CONTRAST: CPT | Mod: 26

## 2018-07-07 PROCEDURE — 71045 X-RAY EXAM CHEST 1 VIEW: CPT | Mod: 26

## 2018-07-07 PROCEDURE — 81001 URINALYSIS AUTO W/SCOPE: CPT

## 2018-07-07 PROCEDURE — 87186 SC STD MICRODIL/AGAR DIL: CPT

## 2018-07-07 PROCEDURE — 71045 X-RAY EXAM CHEST 1 VIEW: CPT

## 2018-07-07 PROCEDURE — 74176 CT ABD & PELVIS W/O CONTRAST: CPT

## 2018-07-07 RX ORDER — ACETAMINOPHEN 500 MG
2 TABLET ORAL
Qty: 120 | Refills: 0
Start: 2018-07-07 | End: 2018-07-20

## 2018-07-07 RX ORDER — CEFPODOXIME PROXETIL 100 MG
200 TABLET ORAL ONCE
Qty: 0 | Refills: 0 | Status: COMPLETED | OUTPATIENT
Start: 2018-07-07 | End: 2018-07-07

## 2018-07-07 RX ORDER — CEFUROXIME AXETIL 250 MG
1 TABLET ORAL
Qty: 13 | Refills: 0 | OUTPATIENT
Start: 2018-07-07 | End: 2018-07-13

## 2018-07-07 RX ADMIN — Medication 200 MILLIGRAM(S): at 20:01

## 2018-07-07 NOTE — ED ADULT NURSE NOTE - OBJECTIVE STATEMENT
67y Female PMH Afib on Xarelto, Multiple Myeloma with Mediport to upper R. chest presents to the ED c/o abd pain. Pt states she has had a recurrent UTI since May, treated with Macrobid, Keflex and Cetfin. Pt reports lower abd pain since this morning, went to Urgent Care and was sent here for dysuria, hematuria and urinary frequency X2days 67y Female PMH Afib on Xarelto, Multiple Myeloma with Mediport to upper R. chest presents to the ED c/o abd pain. Pt states she has had a recurrent UTI since May, treated with Macrobid, Keflex and Cetfin. Pt reports lower abd pain since this morning, went to Urgent Care and was sent here for dysuria, hematuria and urinary frequency X2days. Pt presents a&oX4, ambulatory, well in appearance, airway intact, breathing spontaneously and unlabored, abd soft nondistended, tender to palpation in LLQ, +bowel sounds, skin warm dry and intact, cap refill <2 seconds, denies ha, dizziness, CP, SOB, melena, n/v, chills/fever. MD at bedside for eval. Safety maintained.

## 2018-07-07 NOTE — ED PROVIDER NOTE - CARE PLAN
Assessment and plan of treatment:	1. Follow up with your PMD within 1-2 days. Additionally please follow up with your urologist in 2-3 days.  2. Take Cefuroxime 1 tab twice daily for 10 days.  Increase fluids. Take Tylenol 650mg 1 tab every 4-6 hours as needed for pain or fever greater than 99.9.   3. Worsening pain, new fever, chills, nausea, vomiting return to ER. Principal Discharge DX:	Cystitis  Assessment and plan of treatment:	You were seen and evaluated in the emergency department for abdominal pain. You had a thorough evaluation including an exam, labs ____and imaging_____. You were given medications for comfort. Your workup did not demonstrate any concerning findings. This does not mean that your pain is not real, only that we were unable to find a dangerous or life-threatening cause. Please read the attached information sheets as they will provide useful information regarding your condition.    It is important to understand that while your workup was reassuring, no workup can completely exclude all concerning conditions. Therfore, please return if you develop worsening or concerning new symptoms including worsening pain, pain that spreads to new places, inability to tolerate an oral diet, new and worsening fevers and chills, weakness, inability to pass stool or flatulent gas, those included on the attached information sheets, or other findings concerning to you. Please take all your medications as prescribed.    1. Follow up with your PMD within 1-2 days. Additionally please follow up with your urologist in 2-3 days.  2. Take Cefuroxime 1 tab twice daily for 7 days.  Increase fluids.   3. Take acetaminophen as prescribed for pain or fever, 1-2 tabs as needed every 6 hours. Principal Discharge DX:	Cystitis  Assessment and plan of treatment:	You were seen and evaluated in the emergency department for abdominal pain. You had a thorough evaluation including an exam, labs and imaging. You were given medications for comfort. Your workup did not demonstrate any concerning findings. This does not mean that your pain is not real, only that we were unable to find a dangerous or life-threatening cause. Please read the attached information sheets as they will provide useful information regarding your condition.    It is important to understand that while your workup was reassuring, no workup can completely exclude all concerning conditions. Therfore, please return if you develop worsening or concerning new symptoms including worsening pain, pain that spreads to new places, inability to tolerate an oral diet, new and worsening fevers and chills, weakness, inability to pass stool or flatulent gas, those included on the attached information sheets, or other findings concerning to you. Please take all your medications as prescribed.    1. Follow up with your PMD within 1-2 days. Additionally please follow up with your urologist in 2-3 days.  2. Take Cefuroxime 1 tab twice daily for 7 days.  Increase fluids.   3. Take acetaminophen as prescribed for pain or fever, 1-2 tabs as needed every 6 hours.

## 2018-07-07 NOTE — ED PROVIDER NOTE - MEDICAL DECISION MAKING DETAILS
abd pain tenderness ro appy.diverticula dz uti check ct ua labs re-assess   Gianluca Mustafa MD, Facep

## 2018-07-07 NOTE — ED PROVIDER NOTE - ATTENDING CONTRIBUTION TO CARE
Private Physician Kylee Ellis Pcp, Gastro Jerrod Abbott,  [facility]. Urology Batsheva scottkalpana.  67y female pmh Atrial fibrillation, Pulmonary Amyloidosis, HLD, Multiple myleoma, Scleromyxedema with   Transplants  Stem cell transplant March 2001. Pt was treated for uti may 2018, Multiple episode with multiple different organsims, Has had follow up with urology without know cause,not sexually active. Now comes to ed complains of Dysuria treated same with pyridium. Developed abd pain throbbing pressure under umbilicus to pelvis. No dysuria, fever chills, cough, no nvdc. no change in chronic back pain.  PE WDWN NCAT chest clear anterior & posterior cv no rmg neruo no focal defects. Abd mild epigastric tp no rebound guarding.   Gianluca Mustafa MD, Facep

## 2018-07-07 NOTE — ED PROVIDER NOTE - OBJECTIVE STATEMENT
66 yo male PMHx of multiple myeloma, afib (on xarelto), scleromyxedema, HLD, multiple UTIs in past 2 months, each time with a different organism and different abx, including macrobid, Keflex, and most recently Ceftin which she just finished. Last night patient began to develop urinary symptoms including frequency and dysuria which felt like her UTIs. She took a pyridium last night and then this AM had 2 normal BMs and began to develop 66 yo male PMHx of multiple myeloma, afib (on xarelto), scleromyxedema, HLD, multiple UTIs in past 2 months, each time with a different organism and different abx, including macrobid, Keflex, and most recently Ceftin which she just finished. Last night patient began to develop urinary symptoms including frequency and dysuria which felt like her UTIs. She took a pyridium last night and then this AM had 2 normal BMs and began to develop lower abdominal pain. Went to UC where had urine done which was + but given abdominal. 66 yo male PMHx of multiple myeloma, afib (on xarelto), scleromyxedema, HLD, multiple UTIs in past 2 months, each time with a different organism and different abx, including macrobid, Keflex, and most recently Ceftin which she just finished. Last night patient began to develop urinary symptoms including frequency and dysuria which felt like her UTIs. She took a pyridium last night and then this AM had 2 normal BMs and began to develop b/l lower abdominal pain. Went to  where had urine done which was + but given abdominal pain she was sent in for a CT scan. Denies chest pain, fever/chills, n/v/d, blood in stool, weight loss, palpitations, fatigue, recent travel/hospitalization. 66 yo female PMHx of multiple myeloma, afib (on xarelto), scleromyxedema, HLD, multiple UTIs in past 2 months, each time with a different organism and different abx, including macrobid, Keflex, and most recently Ceftin which she just finished. Last night patient began to develop urinary symptoms including frequency and dysuria which felt like her UTIs. She took a pyridium last night and then this AM had 2 normal BMs and began to develop b/l lower abdominal pain. Went to  where had urine done which was + but given abdominal pain she was sent in for a CT scan. Denies chest pain, fever/chills, n/v/d, blood in stool, weight loss, palpitations, fatigue, recent travel/hospitalization.

## 2018-07-07 NOTE — ED PROVIDER NOTE - PLAN OF CARE
1. Follow up with your PMD within 1-2 days. Additionally please follow up with your urologist in 2-3 days.  2. Take Cefuroxime 1 tab twice daily for 10 days.  Increase fluids. Take Tylenol 650mg 1 tab every 4-6 hours as needed for pain or fever greater than 99.9.   3. Worsening pain, new fever, chills, nausea, vomiting return to ER. You were seen and evaluated in the emergency department for abdominal pain. You had a thorough evaluation including an exam, labs ____and imaging_____. You were given medications for comfort. Your workup did not demonstrate any concerning findings. This does not mean that your pain is not real, only that we were unable to find a dangerous or life-threatening cause. Please read the attached information sheets as they will provide useful information regarding your condition.    It is important to understand that while your workup was reassuring, no workup can completely exclude all concerning conditions. Therfore, please return if you develop worsening or concerning new symptoms including worsening pain, pain that spreads to new places, inability to tolerate an oral diet, new and worsening fevers and chills, weakness, inability to pass stool or flatulent gas, those included on the attached information sheets, or other findings concerning to you. Please take all your medications as prescribed.    1. Follow up with your PMD within 1-2 days. Additionally please follow up with your urologist in 2-3 days.  2. Take Cefuroxime 1 tab twice daily for 7 days.  Increase fluids.   3. Take acetaminophen as prescribed for pain or fever, 1-2 tabs as needed every 6 hours. You were seen and evaluated in the emergency department for abdominal pain. You had a thorough evaluation including an exam, labs and imaging. You were given medications for comfort. Your workup did not demonstrate any concerning findings. This does not mean that your pain is not real, only that we were unable to find a dangerous or life-threatening cause. Please read the attached information sheets as they will provide useful information regarding your condition.    It is important to understand that while your workup was reassuring, no workup can completely exclude all concerning conditions. Therfore, please return if you develop worsening or concerning new symptoms including worsening pain, pain that spreads to new places, inability to tolerate an oral diet, new and worsening fevers and chills, weakness, inability to pass stool or flatulent gas, those included on the attached information sheets, or other findings concerning to you. Please take all your medications as prescribed.    1. Follow up with your PMD within 1-2 days. Additionally please follow up with your urologist in 2-3 days.  2. Take Cefuroxime 1 tab twice daily for 7 days.  Increase fluids.   3. Take acetaminophen as prescribed for pain or fever, 1-2 tabs as needed every 6 hours.

## 2018-07-07 NOTE — ED ADULT NURSE REASSESSMENT NOTE - NS ED NURSE REASSESS COMMENT FT1
MD Mustafa looked at Xray results and states he will put in provider to RN OK to access Mediport
Pt refusing IV line, wants us to access her Mediport. Pt aware that we need to wait for Xray confirmation of placement. MD Stone aware and at bedside
Pt taken to CT; stable
Xray performed at bedside. Awaiting results to access [facility]. Pt aware of plan of care. Pt currently drinking oral contrast for CT of abd
Pharmacy called for medication
Pt brought to US
Pt remains a&oX4, in no apparent distress. Denies pain at this time. VSS. Pt watching TV in room. Awaiting medication from pharmacy
Pt returned from CT, stable. Mediport accessed under sterile technique with two RNs present. Pt tolerated well.

## 2018-07-07 NOTE — ED PROVIDER NOTE - PROGRESS NOTE DETAILS
Endorsed to Dr Jay Mustafa MD, Facep ATTENDING MD Jace: pt feels improved, no CVAT, CT read concerning for possile ureteral stone at UVJ stone given hematuria. only nonvisualized area is UVJ, will get ultarsound, reassess. Pt has no CVAT, no concern for pyelo ATTENDING MD Jace: readilogy read delayed by trauma. wet read from resident is normal ultrasound, no hydro, normal bilateral jets, no visualized stone. awiting finalized report. if nromal will DC on cefuroxime, f/u with . I have advised the patient on the usual course of this condition, home care, an appropriate schedule for follow-up, and concerning signs and symptoms that should prompt return to the emergency department. I answered all questions to the best of my ability. The patient is stable for discharge.

## 2018-07-08 VITALS
DIASTOLIC BLOOD PRESSURE: 82 MMHG | OXYGEN SATURATION: 100 % | HEART RATE: 70 BPM | RESPIRATION RATE: 16 BRPM | SYSTOLIC BLOOD PRESSURE: 128 MMHG | TEMPERATURE: 98 F

## 2018-07-10 NOTE — ED POST DISCHARGE NOTE - DETAILS
Called to see how patient is feeling and if requiring abx change. Left VM. - Kylee Smith PA-C Called back by patient. Reports she is feeling well and spoke with her Urologist this AM, Dr. Batsheva Tam, who already reviewed UCx and instructed patient to continue abx. Recommended that patient f/u with urologist after abx completion for repeat UA/UCx. Patient understands. - Kylee Smith PA-C

## 2018-07-10 NOTE — ED POST DISCHARGE NOTE - RESULT SUMMARY
UCx + >100,000 CFU/mL E.Coli. Patient d/c on ceftin. Cx results indeterminate sensitivity to cefazolin; sensitive to ceftriaxone and cefoxitin

## 2018-07-18 ENCOUNTER — OUTPATIENT (OUTPATIENT)
Dept: OUTPATIENT SERVICES | Facility: HOSPITAL | Age: 68
LOS: 1 days | Discharge: HOME | End: 2018-07-18

## 2018-07-18 ENCOUNTER — APPOINTMENT (OUTPATIENT)
Dept: SPINE | Facility: CLINIC | Age: 68
End: 2018-07-18

## 2018-07-18 DIAGNOSIS — D47.2 MONOCLONAL GAMMOPATHY: ICD-10-CM

## 2018-07-18 PROBLEM — L98.5 MUCINOSIS OF THE SKIN: Chronic | Status: ACTIVE | Noted: 2018-01-01

## 2018-07-23 ENCOUNTER — APPOINTMENT (OUTPATIENT)
Dept: UROLOGY | Facility: CLINIC | Age: 68
End: 2018-07-23
Payer: MEDICARE

## 2018-07-23 VITALS — HEART RATE: 68 BPM | SYSTOLIC BLOOD PRESSURE: 102 MMHG | RESPIRATION RATE: 16 BRPM | DIASTOLIC BLOOD PRESSURE: 70 MMHG

## 2018-07-23 PROCEDURE — 99213 OFFICE O/P EST LOW 20 MIN: CPT

## 2018-07-23 RX ORDER — CEFUROXIME AXETIL 500 MG/1
500 TABLET ORAL
Qty: 14 | Refills: 0 | Status: DISCONTINUED | COMMUNITY
Start: 2018-06-22 | End: 2018-07-23

## 2018-07-24 LAB
APPEARANCE: CLEAR
BACTERIA: NEGATIVE
BILIRUBIN URINE: NEGATIVE
BLOOD URINE: ABNORMAL
COLOR: ABNORMAL
GLUCOSE QUALITATIVE U: NEGATIVE MG/DL
HYALINE CASTS: 14 /LPF
KETONES URINE: NEGATIVE
LEUKOCYTE ESTERASE URINE: ABNORMAL
MICROSCOPIC-UA: NORMAL
NITRITE URINE: NEGATIVE
PH URINE: 5.5
PROTEIN URINE: NEGATIVE MG/DL
RED BLOOD CELLS URINE: 5 /HPF
SPECIFIC GRAVITY URINE: 1.02
SQUAMOUS EPITHELIAL CELLS: 18 /HPF
UROBILINOGEN URINE: 1 MG/DL
WHITE BLOOD CELLS URINE: 11 /HPF

## 2018-07-25 ENCOUNTER — APPOINTMENT (OUTPATIENT)
Dept: INFUSION THERAPY | Facility: HOSPITAL | Age: 68
End: 2018-07-25

## 2018-07-25 LAB — BACTERIA UR CULT: NORMAL

## 2018-07-26 ENCOUNTER — APPOINTMENT (OUTPATIENT)
Dept: INFUSION THERAPY | Facility: HOSPITAL | Age: 68
End: 2018-07-26

## 2018-07-27 ENCOUNTER — LABORATORY RESULT (OUTPATIENT)
Age: 68
End: 2018-07-27

## 2018-07-27 DIAGNOSIS — D80.1 NONFAMILIAL HYPOGAMMAGLOBULINEMIA: ICD-10-CM

## 2018-07-31 ENCOUNTER — MOBILE ON CALL (OUTPATIENT)
Age: 68
End: 2018-07-31

## 2018-07-31 DIAGNOSIS — L98.5 MUCINOSIS OF THE SKIN: ICD-10-CM

## 2018-08-09 ENCOUNTER — OUTPATIENT (OUTPATIENT)
Dept: OUTPATIENT SERVICES | Facility: HOSPITAL | Age: 68
LOS: 1 days | Discharge: HOME | End: 2018-08-09

## 2018-08-09 DIAGNOSIS — D47.2 MONOCLONAL GAMMOPATHY: ICD-10-CM

## 2018-08-09 DIAGNOSIS — L98.5 MUCINOSIS OF THE SKIN: ICD-10-CM

## 2018-08-09 DIAGNOSIS — D80.1 NONFAMILIAL HYPOGAMMAGLOBULINEMIA: ICD-10-CM

## 2018-09-06 ENCOUNTER — OUTPATIENT (OUTPATIENT)
Dept: OUTPATIENT SERVICES | Facility: HOSPITAL | Age: 68
LOS: 1 days | Discharge: HOME | End: 2018-09-06

## 2018-09-06 DIAGNOSIS — D47.2 MONOCLONAL GAMMOPATHY: ICD-10-CM

## 2018-09-09 ENCOUNTER — APPOINTMENT (OUTPATIENT)
Dept: MRI IMAGING | Facility: IMAGING CENTER | Age: 68
End: 2018-09-09
Payer: MEDICARE

## 2018-09-09 ENCOUNTER — OUTPATIENT (OUTPATIENT)
Dept: OUTPATIENT SERVICES | Facility: HOSPITAL | Age: 68
LOS: 1 days | End: 2018-09-09
Payer: MEDICARE

## 2018-09-09 ENCOUNTER — APPOINTMENT (OUTPATIENT)
Dept: CT IMAGING | Facility: IMAGING CENTER | Age: 68
End: 2018-09-09
Payer: MEDICARE

## 2018-09-09 DIAGNOSIS — Z00.8 ENCOUNTER FOR OTHER GENERAL EXAMINATION: ICD-10-CM

## 2018-09-09 PROCEDURE — 72125 CT NECK SPINE W/O DYE: CPT | Mod: 26

## 2018-09-09 PROCEDURE — 72125 CT NECK SPINE W/O DYE: CPT

## 2018-09-09 PROCEDURE — 72148 MRI LUMBAR SPINE W/O DYE: CPT | Mod: 26

## 2018-09-09 PROCEDURE — 72148 MRI LUMBAR SPINE W/O DYE: CPT

## 2018-09-11 ENCOUNTER — APPOINTMENT (OUTPATIENT)
Dept: INFUSION THERAPY | Facility: HOSPITAL | Age: 68
End: 2018-09-11

## 2018-09-12 ENCOUNTER — APPOINTMENT (OUTPATIENT)
Dept: INFUSION THERAPY | Facility: HOSPITAL | Age: 68
End: 2018-09-12

## 2018-09-19 DIAGNOSIS — L98.5 MUCINOSIS OF THE SKIN: ICD-10-CM

## 2018-09-19 DIAGNOSIS — D80.1 NONFAMILIAL HYPOGAMMAGLOBULINEMIA: ICD-10-CM

## 2018-09-20 ENCOUNTER — LABORATORY RESULT (OUTPATIENT)
Age: 68
End: 2018-09-20

## 2018-09-20 ENCOUNTER — APPOINTMENT (OUTPATIENT)
Dept: INTERNAL MEDICINE | Facility: CLINIC | Age: 68
End: 2018-09-20
Payer: MEDICARE

## 2018-09-20 VITALS
WEIGHT: 188 LBS | HEIGHT: 67 IN | TEMPERATURE: 97.4 F | BODY MASS INDEX: 29.51 KG/M2 | DIASTOLIC BLOOD PRESSURE: 80 MMHG | SYSTOLIC BLOOD PRESSURE: 120 MMHG

## 2018-09-20 DIAGNOSIS — J93.83 OTHER PNEUMOTHORAX: ICD-10-CM

## 2018-09-20 PROCEDURE — 99214 OFFICE O/P EST MOD 30 MIN: CPT

## 2018-09-20 RX ORDER — DARATUMUMAB 100 MG/5ML
INJECTION, SOLUTION, CONCENTRATE INTRAVENOUS
Refills: 0 | Status: ACTIVE | COMMUNITY

## 2018-09-20 RX ORDER — HYDROMORPHONE HYDROCHLORIDE 4 MG/1
4 TABLET ORAL
Refills: 0 | Status: ACTIVE | COMMUNITY

## 2018-09-20 RX ORDER — METHENAMINE HIPPURATE 1 G/1
1 TABLET ORAL TWICE DAILY
Qty: 60 | Refills: 5 | Status: COMPLETED | COMMUNITY
Start: 2017-03-27 | End: 2018-09-20

## 2018-09-20 RX ORDER — TOPIRAMATE 25 MG/1
25 TABLET, FILM COATED ORAL DAILY
Qty: 15 | Refills: 0 | Status: COMPLETED | COMMUNITY
Start: 2018-05-25 | End: 2018-09-20

## 2018-09-20 RX ORDER — CEFDINIR 300 MG/1
300 CAPSULE ORAL TWICE DAILY
Qty: 28 | Refills: 0 | Status: COMPLETED | COMMUNITY
Start: 2018-07-31 | End: 2018-09-20

## 2018-09-20 NOTE — HISTORY OF PRESENT ILLNESS
[FreeTextEntry1] : Patient presents for follow up evaluation for multiple medical concerns including:\par multiple myeloma, severe and chronic neuropathy and neuralgia of bilateral legs, recent newly diagnosed pulmonary amyloidosis. [de-identified] : Patient presents for followup of multiple issues. \par She developed a hacking and persistent cough and in 12/31/2017 she was admitted with Pneumonia and Pneumothorax requiring a chest tube.\par She continued to have persistent cough with thick mucous production-unresponsive to inhalers.\par She underwent a bronchoscopy in 3/2018 with Dr. Dimitry Ruiz and was  diagnosed with pulmonary amyloidosis- \par She was started on treatment with Darzalex- IV infusions weekly for 8 weeks followed by IV infusions every other week for 8 treatments- and in 10/2018 she will go down to IV infusions monthly.\par \par She continues to get treatment with IVIG every 2 weeks with Dr. Cano\par \par Chest continued severe neuralgia and pain due to peripheral neuropathy of her legs. She is now on low-dose hydromorphone p.r.n. breakthrough pain.

## 2018-09-24 LAB — BACTERIA UR CULT: NORMAL

## 2018-10-17 ENCOUNTER — OUTPATIENT (OUTPATIENT)
Dept: OUTPATIENT SERVICES | Facility: HOSPITAL | Age: 68
LOS: 1 days | Discharge: HOME | End: 2018-10-17

## 2018-10-17 DIAGNOSIS — L98.5 MUCINOSIS OF THE SKIN: ICD-10-CM

## 2018-10-17 DIAGNOSIS — D47.2 MONOCLONAL GAMMOPATHY: ICD-10-CM

## 2018-10-17 DIAGNOSIS — D80.1 NONFAMILIAL HYPOGAMMAGLOBULINEMIA: ICD-10-CM

## 2018-10-24 ENCOUNTER — APPOINTMENT (OUTPATIENT)
Dept: INFUSION THERAPY | Facility: HOSPITAL | Age: 68
End: 2018-10-24

## 2018-10-25 ENCOUNTER — APPOINTMENT (OUTPATIENT)
Dept: INFUSION THERAPY | Facility: HOSPITAL | Age: 68
End: 2018-10-25

## 2018-11-07 ENCOUNTER — APPOINTMENT (OUTPATIENT)
Dept: SPINE | Facility: CLINIC | Age: 68
End: 2018-11-07
Payer: MEDICARE

## 2018-11-07 VITALS
WEIGHT: 188 LBS | SYSTOLIC BLOOD PRESSURE: 112 MMHG | HEIGHT: 67 IN | DIASTOLIC BLOOD PRESSURE: 72 MMHG | BODY MASS INDEX: 29.51 KG/M2 | HEART RATE: 66 BPM

## 2018-11-07 PROCEDURE — 99204 OFFICE O/P NEW MOD 45 MIN: CPT

## 2018-11-07 RX ORDER — MONTELUKAST 10 MG/1
10 TABLET, FILM COATED ORAL
Qty: 30 | Refills: 0 | Status: COMPLETED | COMMUNITY
Start: 2018-03-19 | End: 2018-11-07

## 2018-11-27 ENCOUNTER — OUTPATIENT (OUTPATIENT)
Dept: OUTPATIENT SERVICES | Facility: HOSPITAL | Age: 68
LOS: 1 days | Discharge: HOME | End: 2018-11-27

## 2018-11-27 DIAGNOSIS — D80.1 NONFAMILIAL HYPOGAMMAGLOBULINEMIA: ICD-10-CM

## 2018-11-27 DIAGNOSIS — D47.2 MONOCLONAL GAMMOPATHY: ICD-10-CM

## 2018-11-27 DIAGNOSIS — L98.5 MUCINOSIS OF THE SKIN: ICD-10-CM

## 2018-12-05 ENCOUNTER — APPOINTMENT (OUTPATIENT)
Dept: INFUSION THERAPY | Facility: HOSPITAL | Age: 68
End: 2018-12-05

## 2018-12-06 ENCOUNTER — APPOINTMENT (OUTPATIENT)
Dept: INFUSION THERAPY | Facility: HOSPITAL | Age: 68
End: 2018-12-06

## 2018-12-28 ENCOUNTER — MOBILE ON CALL (OUTPATIENT)
Age: 68
End: 2018-12-28

## 2019-01-04 ENCOUNTER — APPOINTMENT (OUTPATIENT)
Dept: UROLOGY | Facility: CLINIC | Age: 69
End: 2019-01-04
Payer: MEDICARE

## 2019-01-04 DIAGNOSIS — N39.0 URINARY TRACT INFECTION, SITE NOT SPECIFIED: ICD-10-CM

## 2019-01-04 PROCEDURE — 99213 OFFICE O/P EST LOW 20 MIN: CPT

## 2019-01-05 ENCOUNTER — LABORATORY RESULT (OUTPATIENT)
Age: 69
End: 2019-01-05

## 2019-01-08 LAB
APPEARANCE: ABNORMAL
BACTERIA UR CULT: NORMAL
BACTERIA: NEGATIVE
BILIRUBIN URINE: NEGATIVE
BLOOD URINE: ABNORMAL
CALCIUM OXALATE CRYSTALS: NEGATIVE
COLOR: YELLOW
GLUCOSE QUALITATIVE U: NEGATIVE MG/DL
GRANULAR CASTS: 0 /LPF
HYALINE CASTS: 0 /LPF
KETONES URINE: NEGATIVE
LEUKOCYTE ESTERASE URINE: NEGATIVE
MICROSCOPIC-UA: NORMAL
NITRITE URINE: NEGATIVE
PH URINE: 6
PROTEIN URINE: 30 MG/DL
RED BLOOD CELLS URINE: 12 /HPF
SPECIFIC GRAVITY URINE: 1.03
SQUAMOUS EPITHELIAL CELLS: 5 /HPF
TRIPLE PHOSPHATE CRYSTALS: NEGATIVE
URIC ACID CRYSTALS: NEGATIVE
UROBILINOGEN URINE: NEGATIVE MG/DL
WHITE BLOOD CELLS URINE: 6 /HPF

## 2019-01-09 ENCOUNTER — MESSAGE (OUTPATIENT)
Age: 69
End: 2019-01-09

## 2019-01-11 ENCOUNTER — APPOINTMENT (OUTPATIENT)
Dept: ORTHOPEDIC SURGERY | Facility: CLINIC | Age: 69
End: 2019-01-11
Payer: MEDICARE

## 2019-01-11 VITALS
WEIGHT: 185 LBS | DIASTOLIC BLOOD PRESSURE: 82 MMHG | BODY MASS INDEX: 29.03 KG/M2 | HEIGHT: 67 IN | SYSTOLIC BLOOD PRESSURE: 132 MMHG | HEART RATE: 66 BPM

## 2019-01-11 DIAGNOSIS — M25.551 PAIN IN RIGHT HIP: ICD-10-CM

## 2019-01-11 PROCEDURE — 73502 X-RAY EXAM HIP UNI 2-3 VIEWS: CPT

## 2019-01-11 PROCEDURE — 99213 OFFICE O/P EST LOW 20 MIN: CPT

## 2019-01-11 RX ORDER — CEFPODOXIME PROXETIL 200 MG/1
200 TABLET, FILM COATED ORAL
Qty: 14 | Refills: 0 | Status: DISCONTINUED | COMMUNITY
Start: 2018-12-28 | End: 2019-01-11

## 2019-01-11 NOTE — PHYSICAL EXAM
[Normal] : Gait: normal [de-identified] : The right hip flexes easily to 90°. The right hip can externally rotate and internally rotate with minimal discomfort and stiffness. Leg lengths appear perfectly equal. [de-identified] : Radiograph of the right hip and pelvis were obtained. The x-rays reveal a well-positioned well fixed total hip replacement in excellent position and alignment. There is no evidence of fracture dislocation or osteolysis. There is no obvious lesions noted

## 2019-01-11 NOTE — HISTORY OF PRESENT ILLNESS
[Pain Location] : pain [] : right hip [___ wks] : [unfilled] week(s) ago [4] : a minimum pain level of 4/10 [5] : a maximum pain level of 5/10 [Standing] : standing [Intermit.] : ~He/She~ states the symptoms seem to be intermittent [Hip Movement] : worsened by hip movement [Walking] : worsened by walking [Acetaminophen] : relieved by acetaminophen [Exercise Regimen] : relieved by exercise regimen [Opioid Analgesics] : relieved by opioid analgesics [de-identified] : Patient is a 68-year-old female who comes today for evaluation of her right hip pain radiating to the. She is status post bilateral total hip replacement done by Dr. Disla the right hip was performed in 2008 in the left total hip was performed in 2012. The pain started approximately a week and a half ago; she is unsure if this is due to her exercising she had thigh pain that has improved. She rates the pain to be a 4-5 out of 10. She is using Tylenol and Dilaudid for analgesic relief due to her medical history she does have neuropathy she is currently being treated with chemotherapy for her multiple myeloma and amyloidosis.

## 2019-01-12 ENCOUNTER — OUTPATIENT (OUTPATIENT)
Dept: OUTPATIENT SERVICES | Facility: HOSPITAL | Age: 69
LOS: 1 days | Discharge: HOME | End: 2019-01-12

## 2019-01-12 DIAGNOSIS — D47.2 MONOCLONAL GAMMOPATHY: ICD-10-CM

## 2019-01-12 DIAGNOSIS — D80.1 NONFAMILIAL HYPOGAMMAGLOBULINEMIA: ICD-10-CM

## 2019-01-12 DIAGNOSIS — L98.5 MUCINOSIS OF THE SKIN: ICD-10-CM

## 2019-01-16 ENCOUNTER — APPOINTMENT (OUTPATIENT)
Dept: HEMATOLOGY ONCOLOGY | Facility: CLINIC | Age: 69
End: 2019-01-16
Payer: MEDICARE

## 2019-01-16 VITALS
TEMPERATURE: 98.3 F | SYSTOLIC BLOOD PRESSURE: 130 MMHG | WEIGHT: 188.8 LBS | RESPIRATION RATE: 16 BRPM | BODY MASS INDEX: 29.57 KG/M2 | OXYGEN SATURATION: 100 % | HEART RATE: 67 BPM | DIASTOLIC BLOOD PRESSURE: 79 MMHG

## 2019-01-16 DIAGNOSIS — D47.2 MONOCLONAL GAMMOPATHY: ICD-10-CM

## 2019-01-16 PROCEDURE — 99214 OFFICE O/P EST MOD 30 MIN: CPT

## 2019-01-16 NOTE — ASSESSMENT
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 Amyloidosis in lungs, restarted treatment in May 2017. s/p Revlimid since July 2017\par Now on Darzalex since 3/26/18\par c/o peripheral neuropathy\par \par -M spike 1.9 on Darzalex -inc'ed, consider adding Pomalidomide/Dexa. Pt concerned of neuropathy\par \par -amyloidosis being monitored and treated by Dr. Cano. Monitor free light chains (amyloid in the lung on biopsy). Troponins normal. NP pro BNP also being monitored -improving, last checked Nov 2018. Advised pt to get repeat cardiac MRI to monitor \par \par -cont IV Ig 2 days was given every 8 wks -held starting March 2018, since starting Darzalex -restarted in July 2018. Last IV Ig given on 12/5 and 12/6/18 q8 wks\par \par -flu shot done last fall\par \par -follow up in 6 months.

## 2019-01-16 NOTE — REVIEW OF SYSTEMS
[Joint Pain] : joint pain [Muscle Pain] : muscle pain [Negative] : Gastrointestinal [Chills] : no chills [Night Sweats] : no night sweats [Dizziness] : no dizziness [Fainting] : no fainting [FreeTextEntry9] : has pain in legs/hands [de-identified] : tingling in toes and fingers, chronic

## 2019-01-16 NOTE — HISTORY OF PRESENT ILLNESS
[Disease:__________________________] : Disease: [unfilled] [de-identified] : Ms. Rader was first seen in my office in Jan 2014 to establish care for monthly IV Ig for treatment for scleromyxedema. Initially the patient had anemia and monoclonal gammopathy IgG lambda type in 1992. In 1998 she developed skin lesions which were biopsied multiple times before scleromyxedema was diagnosed.  The scleromyxedema was diagnosed in Dec 2000 and and the patient had an autologous stem cell transplant at [facility] in March 2001. The patient had a remission for 14 months, then developed skin lesions and an increased M-spike. She also developed VZV reactivation and has had post-herpetic neuralgia with multiple epidurals and pain medications which were not successful in controlling the pain. \par \par In 2003 the patient started IV Ig 4 days a row monthly with improvement. She came to [facility] in 2011 to continue IV Ig. She was briefly on Revlimid which she did not tolerated because of neutropenia -last given in Jan 2014. \par \par The patient had a BM biopsy done 10/28/14 which showed plasma cell myeloma (IHC  was 15-30%) and amyloidosis. She was started on weekly Velcade SQ injection on 12/17/14 and since then she felt better with regard to skin thickening. She does have peripheral neuropathy.  [de-identified] : 10/28/15 BM Biopsy Final Diagnosis\par Bone marrow biopsy:  \par      -Plasma cell myeloma with amyloidosis\par \par See note and description.\par \par Diagnostic note:\par Correlation with studies for myeloma-related organ dysfunction is necessary for definitive classification (smoldering multiple myeloma versus plasma cell myeloma).\par Comprehensive report with results of pending ancillary studies to follow.\par \par Dr. Cano was notified of the diagnosis on 11/3/14.\par \par Ancillary studies\par Congo red stain is positive for amyloid in a blood vessel and focally in periosteum.\par Flow cytometry: Monoclonal plasma cells (5.46% of cells), positive for cytoplasmic lambda, moderate CD38, dim CD45; negative CD19, CD56.\par Immunohistochemical stain (block 1A, ; small sample) shows increased  positive plasma cells (15 to 30% of cells).\par \par Microscopic description:\par Sections of bone marrow biopsy show patchy cellularity with foci of hypercellularity (70%) and hypocellularity, interstitial plasma cell infiltrate, focally infiltrating fat, trilineage hematopoiesis with maturation, megakaryocytes normal in number and morphology, and increased iron stores. [FreeTextEntry1] : Velcade weekly SQ since 12/2014 -3 weeks on/1 wk off. IV Ig 2 days in a row on a monthly basis [de-identified] : The patient is here for scleromyxedema follow up. She is taking IV Ig monthly -took a break from 5/10/17 until most recently 8/10/17.  No infections, no changes in the skin noted with increased interval. The patient had started Ninlaro on 5/3/17 for lung amyloidosis -stopped due to intolerance (pancytopenia) after 2 wks. She tried Velcade, but neuropathy worsened and it was stopped. Patient was on Revlimid 10mg once daily days 1-21 -started in July 2017, stopped in March 2018 due to stable disease. \par \par The patient started Darzalex on 3/26/18 -she is now on q4 wk. She did not have a reaction. IV Ig on hold  since 3/26/18, restarted in July 2018. No recent infections. \par She has pain in her legs and joints -takes Cymbalta, dose has been changed. \par

## 2019-01-16 NOTE — RESULTS/DATA
[FreeTextEntry1] : On 1/14/19 wbc 4.9 Hb 11.3 plt 209\par \par On 12/17/18 BUN/creat 17/0.6 CEA 4.7.  Mspike 1.9, IgG 2690 IgA 6 IgM 19 IgM 19 Kappa free light chain\par CBC on 6/18/18 wbc 4.55 hb 10.9 plt 180 BUN/creat 11/0.5. M spike 1.1 IgG 1562 IgA 7, IgM 14, free kappa 0.12, free lambda 1.18, ratio 0.1\par On 9/5/17) wbc 3 ANC 2100 Hb 10.7 plt 168\par On 2/14/17 wbc 4.1 Hb 11.9 plt 213\par On 1/18/17 wbc 3 hb 10.9 plt 202 SPEP M spike 1.9g/dl IgG 2890 free light chains lambda 2.76\par ON 10/6/16 wbc 3.7 Hb 10.7 plt 195 creat 0.5. M spike 2g/dl. IgG 2700 lambda 556\par On 9/20/16 M spike 1.7 IgG 2410 lambda 477\par On 8/1/16) wbc 4.9 Hb 11.6 plt 217 ANC 3000\par On 7/19/16 wbc 4.3 Hb 10.8 plt 225 CMP BUN/creat 18/0.5 SPEP M spike 1.6g/dl IgG 2450 IgA 45 IgM 52 Kappa light chain 115 lambda light chain 572 kappa/lambda 0.2 B12 level 1228 folate >20\par On 6/28/16 wbc 4.6 Hb 10.5 plt 198. CMP BUn/creat 15/0.5. SPEP: M spike 1.8g/dl. IgG 2870 IgA 38 IgM 31. Kappa light chain 174 lambda light chain 487 kappa/lambda 0.36\par ON 3/1/16 wbc 4.5 Hb 10 plt 219\par On 9/16/15 wbc 2.9 Hb 10.1 plt 140\par On 8/26/15 wbc 3.2 Hb 10.0 plt 226. SPEP gamma migrating protein. M spike 1.6g/dl. IgG 2220 IgA 42 IgM 28 kappa light chain 229 lambda light chain 472 kappa/lambda 0.249

## 2019-01-16 NOTE — PHYSICAL EXAM
[Restricted in physically strenuous activity but ambulatory and able to carry out work of a light or sedentary nature] : Status 1- Restricted in physically strenuous activity but ambulatory and able to carry out work of a light or sedentary nature, e.g., light house work, office work [Obese] : obese [Normal] : normal spine exam without palpable tenderness, no kyphosis or scoliosis [de-identified] : no thickened facial labial folds [de-identified] : thickened nasal fold [de-identified] : CINDY [de-identified] : min LE edema, R>L chronic [de-identified] : thickened folds on hands with excess skin. 5x5cm R LE  hard erythematous lesion unchanged. No warmth or tenderness

## 2019-01-28 ENCOUNTER — APPOINTMENT (OUTPATIENT)
Dept: INTERNAL MEDICINE | Facility: CLINIC | Age: 69
End: 2019-01-28
Payer: MEDICARE

## 2019-01-28 ENCOUNTER — LABORATORY RESULT (OUTPATIENT)
Age: 69
End: 2019-01-28

## 2019-01-28 PROCEDURE — 43235 EGD DIAGNOSTIC BRUSH WASH: CPT

## 2019-01-28 PROCEDURE — 45380 COLONOSCOPY AND BIOPSY: CPT

## 2019-01-30 ENCOUNTER — APPOINTMENT (OUTPATIENT)
Dept: INFUSION THERAPY | Facility: HOSPITAL | Age: 69
End: 2019-01-30

## 2019-01-31 ENCOUNTER — APPOINTMENT (OUTPATIENT)
Dept: INFUSION THERAPY | Facility: HOSPITAL | Age: 69
End: 2019-01-31

## 2019-03-25 ENCOUNTER — OUTPATIENT (OUTPATIENT)
Dept: OUTPATIENT SERVICES | Facility: HOSPITAL | Age: 69
LOS: 1 days | Discharge: HOME | End: 2019-03-25

## 2019-03-25 DIAGNOSIS — D47.2 MONOCLONAL GAMMOPATHY: ICD-10-CM

## 2019-04-03 ENCOUNTER — TRANSCRIPTION ENCOUNTER (OUTPATIENT)
Age: 69
End: 2019-04-03

## 2019-04-03 ENCOUNTER — APPOINTMENT (OUTPATIENT)
Dept: INFUSION THERAPY | Facility: HOSPITAL | Age: 69
End: 2019-04-03

## 2019-04-04 ENCOUNTER — APPOINTMENT (OUTPATIENT)
Dept: INFUSION THERAPY | Facility: HOSPITAL | Age: 69
End: 2019-04-04

## 2019-04-05 DIAGNOSIS — D80.1 NONFAMILIAL HYPOGAMMAGLOBULINEMIA: ICD-10-CM

## 2019-04-05 DIAGNOSIS — L98.5 MUCINOSIS OF THE SKIN: ICD-10-CM

## 2019-04-10 ENCOUNTER — TRANSCRIPTION ENCOUNTER (OUTPATIENT)
Age: 69
End: 2019-04-10

## 2019-05-24 ENCOUNTER — APPOINTMENT (OUTPATIENT)
Dept: PULMONOLOGY | Facility: CLINIC | Age: 69
End: 2019-05-24
Payer: MEDICARE

## 2019-05-24 VITALS
OXYGEN SATURATION: 98 % | RESPIRATION RATE: 16 BRPM | HEART RATE: 61 BPM | DIASTOLIC BLOOD PRESSURE: 74 MMHG | SYSTOLIC BLOOD PRESSURE: 109 MMHG | TEMPERATURE: 98.5 F

## 2019-05-24 DIAGNOSIS — J99 ORGAN-LIMITED AMYLOIDOSIS: ICD-10-CM

## 2019-05-24 DIAGNOSIS — R06.00 DYSPNEA, UNSPECIFIED: ICD-10-CM

## 2019-05-24 DIAGNOSIS — E85.4 ORGAN-LIMITED AMYLOIDOSIS: ICD-10-CM

## 2019-05-24 DIAGNOSIS — L98.5 MUCINOSIS OF THE SKIN: ICD-10-CM

## 2019-05-24 DIAGNOSIS — I26.99 OTHER PULMONARY EMBOLISM W/OUT ACUTE COR PULMONALE: ICD-10-CM

## 2019-05-24 PROCEDURE — 71046 X-RAY EXAM CHEST 2 VIEWS: CPT

## 2019-05-24 PROCEDURE — 95012 NITRIC OXIDE EXP GAS DETER: CPT

## 2019-05-24 PROCEDURE — 94729 DIFFUSING CAPACITY: CPT

## 2019-05-24 PROCEDURE — 94727 GAS DIL/WSHOT DETER LNG VOL: CPT

## 2019-05-24 PROCEDURE — 94060 EVALUATION OF WHEEZING: CPT

## 2019-05-24 PROCEDURE — 99214 OFFICE O/P EST MOD 30 MIN: CPT | Mod: 25

## 2019-05-24 RX ORDER — MAGNESIUM OXIDE 400 MG
400 (241.3 MG) TABLET ORAL
Qty: 6 | Refills: 0 | Status: DISCONTINUED | COMMUNITY
Start: 2019-04-24

## 2019-05-24 RX ORDER — MIDODRINE HYDROCHLORIDE 5 MG/1
5 TABLET ORAL
Qty: 30 | Refills: 0 | Status: DISCONTINUED | COMMUNITY
Start: 2019-05-16

## 2019-05-24 RX ORDER — DULOXETINE HYDROCHLORIDE 20 MG/1
20 CAPSULE, DELAYED RELEASE ORAL AT BEDTIME
Refills: 0 | Status: DISCONTINUED | COMMUNITY
End: 2019-05-24

## 2019-05-24 RX ORDER — MONTELUKAST SODIUM 10 MG/1
10 TABLET, FILM COATED ORAL
Refills: 0 | Status: DISCONTINUED | COMMUNITY
Start: 2019-01-16 | End: 2019-05-24

## 2019-05-25 NOTE — HISTORY OF PRESENT ILLNESS
[FreeTextEntry1] : in hosp twice for rapid afib; had VQ scan neg; \par stayed in for afib, had heart pause; PPM\par \par on new med for amyloidosis\par \par Increased SOB 2 months\par on darzule\par \par She was told that her amyloidosis is progressing.\par

## 2019-05-25 NOTE — PHYSICAL EXAM
[FreeTextEntry1] : Anxious female no distress [Normal Conjunctiva] : the conjunctiva exhibited no abnormalities [Eyelids - No Xanthelasma] : the eyelids demonstrated no xanthelasmas [Normal Oropharynx] : normal oropharynx [Neck Appearance] : the appearance of the neck was normal [Neck Cervical Mass (___cm)] : no neck mass was observed [Jugular Venous Distention Increased] : there was no jugular-venous distention [Thyroid Diffuse Enlargement] : the thyroid was not enlarged [Thyroid Nodule] : there were no palpable thyroid nodules [Respiration, Rhythm And Depth] : normal respiratory rhythm and effort [Exaggerated Use Of Accessory Muscles For Inspiration] : no accessory muscle use [Auscultation Breath Sounds / Voice Sounds] : lungs were clear to auscultation bilaterally [Abdomen Soft] : soft [Abdomen Tenderness] : non-tender [Abdomen Mass (___ Cm)] : no abdominal mass palpated [Abnormal Walk] : normal gait [Gait - Sufficient For Exercise Testing] : the gait was sufficient for exercise testing [Skin Color & Pigmentation] : normal skin color and pigmentation [] : no rash [No Venous Stasis] : no venous stasis [Skin Lesions] : no skin lesions [No Skin Ulcers] : no skin ulcer [No Xanthoma] : no  xanthoma was observed [Deep Tendon Reflexes (DTR)] : deep tendon reflexes were 2+ and symmetric [Sensation] : the sensory exam was normal to light touch and pinprick [No Focal Deficits] : no focal deficits

## 2019-05-25 NOTE — ASSESSMENT
[FreeTextEntry1] : Most likely worsening dyspnea related to progressive amyloidosis. If that is the case there is little I have to offer her. Thromboembolic disease has been checked and there is no evidence or asthma is worse.

## 2019-05-26 ENCOUNTER — TRANSCRIPTION ENCOUNTER (OUTPATIENT)
Age: 69
End: 2019-05-26

## 2019-06-06 ENCOUNTER — APPOINTMENT (OUTPATIENT)
Dept: PULMONOLOGY | Facility: CLINIC | Age: 69
End: 2019-06-06

## 2019-07-11 ENCOUNTER — OUTPATIENT (OUTPATIENT)
Dept: OUTPATIENT SERVICES | Facility: HOSPITAL | Age: 69
LOS: 1 days | Discharge: HOME | End: 2019-07-11

## 2019-07-11 DIAGNOSIS — D47.2 MONOCLONAL GAMMOPATHY: ICD-10-CM

## 2019-07-11 DIAGNOSIS — D80.1 NONFAMILIAL HYPOGAMMAGLOBULINEMIA: ICD-10-CM

## 2019-07-11 DIAGNOSIS — L98.5 MUCINOSIS OF THE SKIN: ICD-10-CM

## 2019-07-15 ENCOUNTER — APPOINTMENT (OUTPATIENT)
Dept: HEMATOLOGY ONCOLOGY | Facility: CLINIC | Age: 69
End: 2019-07-15
Payer: MEDICARE

## 2019-07-15 VITALS
TEMPERATURE: 98.8 F | SYSTOLIC BLOOD PRESSURE: 92 MMHG | BODY MASS INDEX: 28.51 KG/M2 | OXYGEN SATURATION: 98 % | WEIGHT: 181.99 LBS | HEART RATE: 67 BPM | RESPIRATION RATE: 16 BRPM | DIASTOLIC BLOOD PRESSURE: 63 MMHG

## 2019-07-15 PROCEDURE — 99214 OFFICE O/P EST MOD 30 MIN: CPT

## 2019-07-15 NOTE — RESULTS/DATA
[FreeTextEntry1] : On 7/15/19 wbc 4.8 hb 10.9 plt 195\par \par On 1/14/19 wbc 4.9 Hb 11.3 plt 209\par On 12/17/18 BUN/creat 17/0.6 CEA 4.7.  Mspike 1.9, IgG 2690 IgA 6 IgM 19 IgM 19 Kappa free light chain\par CBC on 6/18/18 wbc 4.55 hb 10.9 plt 180 BUN/creat 11/0.5. M spike 1.1 IgG 1562 IgA 7, IgM 14, free kappa 0.12, free lambda 1.18, ratio 0.1\par On 9/5/17) wbc 3 ANC 2100 Hb 10.7 plt 168\par On 2/14/17 wbc 4.1 Hb 11.9 plt 213\par On 1/18/17 wbc 3 hb 10.9 plt 202 SPEP M spike 1.9g/dl IgG 2890 free light chains lambda 2.76\par ON 10/6/16 wbc 3.7 Hb 10.7 plt 195 creat 0.5. M spike 2g/dl. IgG 2700 lambda 556\par On 9/20/16 M spike 1.7 IgG 2410 lambda 477\par On 8/1/16) wbc 4.9 Hb 11.6 plt 217 ANC 3000\par On 7/19/16 wbc 4.3 Hb 10.8 plt 225 CMP BUN/creat 18/0.5 SPEP M spike 1.6g/dl IgG 2450 IgA 45 IgM 52 Kappa light chain 115 lambda light chain 572 kappa/lambda 0.2 B12 level 1228 folate >20\par On 6/28/16 wbc 4.6 Hb 10.5 plt 198. CMP BUn/creat 15/0.5. SPEP: M spike 1.8g/dl. IgG 2870 IgA 38 IgM 31. Kappa light chain 174 lambda light chain 487 kappa/lambda 0.36\par ON 3/1/16 wbc 4.5 Hb 10 plt 219\par On 9/16/15 wbc 2.9 Hb 10.1 plt 140\par On 8/26/15 wbc 3.2 Hb 10.0 plt 226. SPEP gamma migrating protein. M spike 1.6g/dl. IgG 2220 IgA 42 IgM 28 kappa light chain 229 lambda light chain 472 kappa/lambda 0.249

## 2019-07-15 NOTE — PHYSICAL EXAM
[Restricted in physically strenuous activity but ambulatory and able to carry out work of a light or sedentary nature] : Status 1- Restricted in physically strenuous activity but ambulatory and able to carry out work of a light or sedentary nature, e.g., light house work, office work [Obese] : obese [Normal] : normal spine exam without palpable tenderness, no kyphosis or scoliosis [de-identified] : no thickened facial labial folds [de-identified] : thickened nasal fold [de-identified] : CINDY [de-identified] : min LE edema, R>L chronic [de-identified] : thickened folds on hands with excess skin. 5x5cm R LE  hard erythematous lesion unchanged. No warmth or tenderness

## 2019-07-15 NOTE — HISTORY OF PRESENT ILLNESS
[Disease:__________________________] : Disease: [unfilled] [de-identified] : Ms. Rader was first seen in my office in Jan 2014 to establish care for monthly IV Ig for treatment for scleromyxedema. Initially the patient had anemia and monoclonal gammopathy IgG lambda type in 1992. In 1998 she developed skin lesions which were biopsied multiple times before scleromyxedema was diagnosed.  The scleromyxedema was diagnosed in Dec 2000 and and the patient had an autologous stem cell transplant at [facility] in March 2001. The patient had a remission for 14 months, then developed skin lesions and an increased M-spike. She also developed VZV reactivation and has had post-herpetic neuralgia with multiple epidurals and pain medications which were not successful in controlling the pain. \par \par In 2003 the patient started IV Ig 4 days a row monthly with improvement. She came to [facility] in 2011 to continue IV Ig. She was briefly on Revlimid which she did not tolerated because of neutropenia -last given in Jan 2014. \par \par The patient had a BM biopsy done 10/28/14 which showed plasma cell myeloma (IHC  was 15-30%) and amyloidosis. She was started on weekly Velcade SQ injection on 12/17/14 and since then she felt better with regard to skin thickening. She does have peripheral neuropathy.  [de-identified] : 10/28/15 BM Biopsy Final Diagnosis\par Bone marrow biopsy:  \par      -Plasma cell myeloma with amyloidosis\par \par See note and description.\par \par Diagnostic note:\par Correlation with studies for myeloma-related organ dysfunction is necessary for definitive classification (smoldering multiple myeloma versus plasma cell myeloma).\par Comprehensive report with results of pending ancillary studies to follow.\par \par Dr. Cano was notified of the diagnosis on 11/3/14.\par \par Ancillary studies\par Congo red stain is positive for amyloid in a blood vessel and focally in periosteum.\par Flow cytometry: Monoclonal plasma cells (5.46% of cells), positive for cytoplasmic lambda, moderate CD38, dim CD45; negative CD19, CD56.\par Immunohistochemical stain (block 1A, ; small sample) shows increased  positive plasma cells (15 to 30% of cells).\par \par Microscopic description:\par Sections of bone marrow biopsy show patchy cellularity with foci of hypercellularity (70%) and hypocellularity, interstitial plasma cell infiltrate, focally infiltrating fat, trilineage hematopoiesis with maturation, megakaryocytes normal in number and morphology, and increased iron stores. [FreeTextEntry1] : Velcade weekly SQ since 12/2014 -3 weeks on/1 wk off. IV Ig 2 days in a row on a monthly basis [de-identified] : The patient is here for scleromyxedema follow up. She is taking IV Ig monthly -took a break from 5/10/17 until most recently 8/10/17.  No infections, no changes in the skin noted with increased interval. The patient had started Ninlaro on 5/3/17 for lung amyloidosis -stopped due to intolerance (pancytopenia) after 2 wks. She tried Velcade, but neuropathy worsened and it was stopped. Patient was on Revlimid 10mg once daily days 1-21 -started in July 2017, stopped in March 2018 due to stable disease. \par \par The patient started Darzalex on 3/26/18 -she is now on q4 wk. She did not have a reaction. IV Ig on hold  since 3/26/18, restarted in July 2018. \par The patient was given Pomalidomide 2mg days 1-21 in addition to Daratumumab/Dexa -started C1 on 4/6/19. Since last visit, pt had bradycardia and had PPM on 4/23/19 at [facility], also had rapid A fib -converted-, then developed pancytopenia from drug which had to be held. Last Pomalyst on 5/24/19 -counts now recovered. She did not have fevers, no anemia.  Pt had colonoscopy/EGD in Jan 2019 -biopsy showed amyloid. She also had MRI cardiac on 4/23/19\par

## 2019-07-15 NOTE — ASSESSMENT
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 Amyloidosis in lungs, restarted treatment in May 2017. s/p Revlimid since July 2017\par Now on Darzalex since 3/26/18, added Pom/Dexa in 4/2019\par \par -cont Daratumumab/Pom/Dexa -being treated at [facility] by Dr. Cano\par \par -amyloidosis being monitored and treated by Dr. Cano. Monitor free light chains (amyloid in the lung on biopsy). Troponins normal. NP pro BNP also being monitored. MRI cardiac 4/2019 -will try to get report\par \par -pt was taking IV Ig 2 days was given every 8 wks -held starting March 2018, since starting Darzalex -restarted in July 2018. Last IV Ig given on 4/4/19 -pt held off after that\par \par -follow up in 6 months

## 2019-07-15 NOTE — REVIEW OF SYSTEMS
[Muscle Pain] : muscle pain [Joint Pain] : joint pain [Negative] : Respiratory [Chills] : no chills [Night Sweats] : no night sweats [Abdominal Pain] : no abdominal pain [Vomiting] : no vomiting [Constipation] : constipation [Diarrhea] : diarrhea [Dizziness] : no dizziness [Fainting] : no fainting [FreeTextEntry9] : has pain in legs/hands [de-identified] : tingling in toes and fingers, chronic

## 2019-07-22 ENCOUNTER — TRANSCRIPTION ENCOUNTER (OUTPATIENT)
Age: 69
End: 2019-07-22

## 2019-08-19 ENCOUNTER — EMERGENCY (EMERGENCY)
Facility: HOSPITAL | Age: 69
LOS: 1 days | Discharge: HOME | End: 2019-08-19
Attending: EMERGENCY MEDICINE
Payer: MEDICARE

## 2019-08-19 VITALS
TEMPERATURE: 97 F | HEART RATE: 75 BPM | RESPIRATION RATE: 16 BRPM | DIASTOLIC BLOOD PRESSURE: 70 MMHG | OXYGEN SATURATION: 96 % | SYSTOLIC BLOOD PRESSURE: 115 MMHG

## 2019-08-19 VITALS
TEMPERATURE: 98 F | HEART RATE: 66 BPM | WEIGHT: 179.9 LBS | SYSTOLIC BLOOD PRESSURE: 148 MMHG | RESPIRATION RATE: 18 BRPM | OXYGEN SATURATION: 100 % | DIASTOLIC BLOOD PRESSURE: 87 MMHG

## 2019-08-19 PROCEDURE — 73590 X-RAY EXAM OF LOWER LEG: CPT | Mod: 26,RT

## 2019-08-19 PROCEDURE — 70450 CT HEAD/BRAIN W/O DYE: CPT | Mod: 26

## 2019-08-19 PROCEDURE — 73562 X-RAY EXAM OF KNEE 3: CPT | Mod: 26,RT

## 2019-08-19 PROCEDURE — 99284 EMERGENCY DEPT VISIT MOD MDM: CPT | Mod: GC

## 2019-08-19 PROCEDURE — 73630 X-RAY EXAM OF FOOT: CPT | Mod: 26,RT

## 2019-08-19 PROCEDURE — 73610 X-RAY EXAM OF ANKLE: CPT | Mod: 26,RT

## 2019-08-19 RX ORDER — ACETAMINOPHEN 500 MG
650 TABLET ORAL ONCE
Refills: 0 | Status: COMPLETED | OUTPATIENT
Start: 2019-08-19 | End: 2019-08-19

## 2019-08-19 RX ADMIN — Medication 650 MILLIGRAM(S): at 21:02

## 2019-08-19 RX ADMIN — Medication 650 MILLIGRAM(S): at 18:34

## 2019-08-19 NOTE — ED ADULT NURSE NOTE - OBJECTIVE STATEMENT
68 y f came to the ed c/o right ankle pain and right knee pain. patient had mechanical fall. denies hitting her head/loc. patient is on blood thinners. patient is a/ox3. able to ambulate although causes pain and needs assistance. states using a cane as baseline. patient has abrasion on right knee.

## 2019-08-19 NOTE — ED PROVIDER NOTE - OBJECTIVE STATEMENT
68F pmhx afib, htn, multiple myeloma on chemo, presenting after falling at her doctor's office after her chemotherapy appointment today. Patient is experiencing pain from her right knee down to her foot. States that fall was mechanical, but does not quite remember how it happened other than that she landed on her right side. Denies LOC, headaches, nasuea, vomting, amnesia associated with the event. Denies pain any where other than right lower extremities. She is on blood thinners.

## 2019-08-19 NOTE — ED PROVIDER NOTE - ATTENDING CONTRIBUTION TO CARE
I have seen and evaluated this patient with the resident.   I agree with the findings  unless other wise stated.  I have made appropriate changes in documentations where needed, After my face to face bedside evaluation, I am further  notinF pmhx afib, htn, multiple myeloma on chemo, presenting after falling at her doctor's office after her chemotherapy appointment today. Patient is experiencing pain from her right knee down to her foot. States that fall was mechanical, but does not quite remember how it happened other than that she landed on her right side. Denies LOC, headaches, nasuea, vomting, amnesia associated with the event. Denies pain any where other than right lower extremities. She is on blood thinners. Pt alert no distress pain tender over lower extremity tender around knee No distal neuro vascular deficit V\Clear lungs Regula heart sounds s1s2 Ct head and extremity xrays reviewed walk test done in ED safe outpatient follow up --Alonzo

## 2019-08-19 NOTE — ED PROVIDER NOTE - NS ED ROS FT
CONST: no fevers, no chills, + trauma  EYES: no pain, no visual disturbances  ENT: no sore throat, no epistaxis, no rhinorrhea, no hearing changes  CV: no chest pain, no palpitations, no orthopnea, +right lower extremity pain and swelling  RESP: no shortness of breath, no cough, no sputum, no pleurisy, no wheezing  ABD: no abdominal pain, no nausea, no vomiting, no diarrhea, no black or bloody stool  : no dysuria, no hematuria, no frequency, no urgency  MSK: no back pain, no neck pain,+ right lower extremity pain  NEURO: no headache, no sensory disturbances, no focal weakness, no dizziness  HEME: no easy bleeding or bruising  SKIN: no diaphoresis, no rash

## 2019-08-19 NOTE — ED PROVIDER NOTE - NSFOLLOWUPINSTRUCTIONS_ED_ALL_ED_FT
To control your pain at home, take Tylenol 650mg-1000mg every 6 to 8 hours. Limit your maximum daily Tylenol from all sources to 4000mg. Be aware that many other medications contain acetaminophen which is also known as Tylenol. Taking Tylenol and Ibuprofen together has been shown to be more effective at relieving pain than taking them separately. These are both over the counter medications that you can  at your local pharmacy without a prescription. You need to respect all of the warnings on the bottles. You shouldn’t take these medications for more than a week without following up with your doctor. Both medications come with certain risks and side effects that you need to discuss with your doctor, especially if you are taking them for a prolonged period.    Please see your primary doctor or orthopedic doctor in 2-3 days for follow-up care. Return to ER for any new or worsening symptoms including increased swelling or increasing pain. Rest, Ice, Compress, and Elevate the leg when possible.

## 2019-08-19 NOTE — ED PROVIDER NOTE - CLINICAL SUMMARY MEDICAL DECISION MAKING FREE TEXT BOX
68F on blood thinners presenting with CC of mechnaical fall + leg pain. PE: erythema, edema, pain at right knee with visible abrasion. 68F on blood thinners presenting with CC of mechnaical fall + leg pain. PE: erythema, edema, pain at right knee with visible abrasion. Plan: Head CT, tylenol, Xray of right knee down to foot.

## 2019-08-19 NOTE — ED PROVIDER NOTE - PHYSICAL EXAMINATION
Const: Well-nourished, Well-developed, appearing stated age.  Eyes: PERRL, no conjunctival injection, and symmetrical lids.  HEENT: Head NCAT, no lesions. Atraumatic external nose and ears. Moist MM.  Neck: Symmetric, trachea midline, No thyromegaly.  CVS: +S1/S2, Peripheral pulses 2+ and equal in all extremities.  RESP: Unlabored respiratory effort. Clear to auscultation bilaterally.  GI: Nontender/Nondistended, No hepatosplenomegaly.  MSK: Normocephalic/Atraumatic, +abrasion noted on right knee, with tenderness to palpation at lateral meniscus and patella. +edema and erythema around injury.   Skin: Warm, dry and intact.   Neuro: CNs II-XII grossly intact. Motor & Sensation grossly intact in b/l LE in comparison to patient's baseline.   Psych: Awake, Alert, & Oriented (AAO) x3. Appropriate mood and affect.

## 2019-08-20 ENCOUNTER — INPATIENT (INPATIENT)
Facility: HOSPITAL | Age: 69
LOS: 2 days | Discharge: HOME | DRG: 534 | End: 2019-08-23
Attending: HOSPITALIST | Admitting: STUDENT IN AN ORGANIZED HEALTH CARE EDUCATION/TRAINING PROGRAM
Payer: MEDICARE

## 2019-08-20 VITALS
WEIGHT: 179.9 LBS | HEART RATE: 90 BPM | OXYGEN SATURATION: 100 % | TEMPERATURE: 99 F | RESPIRATION RATE: 19 BRPM | HEIGHT: 67 IN

## 2019-08-20 DIAGNOSIS — S72.491A OTHER FRACTURE OF LOWER END OF RIGHT FEMUR, INITIAL ENCOUNTER FOR CLOSED FRACTURE: ICD-10-CM

## 2019-08-20 LAB
APTT BLD: >200 SEC — CRITICAL HIGH (ref 27.5–36.3)
BASOPHILS # BLD AUTO: 0 K/UL — SIGNIFICANT CHANGE UP (ref 0–0.2)
BASOPHILS NFR BLD AUTO: 0.1 % — SIGNIFICANT CHANGE UP (ref 0–2)
EOSINOPHIL # BLD AUTO: 0 K/UL — SIGNIFICANT CHANGE UP (ref 0–0.5)
EOSINOPHIL NFR BLD AUTO: 0.6 % — SIGNIFICANT CHANGE UP (ref 0–6)
HCT VFR BLD CALC: 31.3 % — LOW (ref 34.5–45)
HGB BLD-MCNC: 10.3 G/DL — LOW (ref 11.5–15.5)
INR BLD: 1.53 RATIO — HIGH (ref 0.88–1.16)
LYMPHOCYTES # BLD AUTO: 1 K/UL — SIGNIFICANT CHANGE UP (ref 1–3.3)
LYMPHOCYTES # BLD AUTO: 15.1 % — SIGNIFICANT CHANGE UP (ref 13–44)
MAGNESIUM SERPL-MCNC: 1.8 MG/DL — SIGNIFICANT CHANGE UP (ref 1.6–2.6)
MCHC RBC-ENTMCNC: 32.8 GM/DL — SIGNIFICANT CHANGE UP (ref 32–36)
MCHC RBC-ENTMCNC: 34.3 PG — HIGH (ref 27–34)
MCV RBC AUTO: 105 FL — HIGH (ref 80–100)
MONOCYTES # BLD AUTO: 0.4 K/UL — SIGNIFICANT CHANGE UP (ref 0–0.9)
MONOCYTES NFR BLD AUTO: 6.3 % — SIGNIFICANT CHANGE UP (ref 2–14)
NEUTROPHILS # BLD AUTO: 5 K/UL — SIGNIFICANT CHANGE UP (ref 1.8–7.4)
NEUTROPHILS NFR BLD AUTO: 77.9 % — HIGH (ref 43–77)
PHOSPHATE SERPL-MCNC: 2.9 MG/DL — SIGNIFICANT CHANGE UP (ref 2.5–4.5)
PLATELET # BLD AUTO: 178 K/UL — SIGNIFICANT CHANGE UP (ref 150–400)
PROTHROM AB SERPL-ACNC: 17.8 SEC — HIGH (ref 10–12.9)
RBC # BLD: 3 M/UL — LOW (ref 3.8–5.2)
RBC # FLD: 15.4 % — HIGH (ref 10.3–14.5)
WBC # BLD: 6.4 K/UL — SIGNIFICANT CHANGE UP (ref 3.8–10.5)
WBC # FLD AUTO: 6.4 K/UL — SIGNIFICANT CHANGE UP (ref 3.8–10.5)

## 2019-08-20 PROCEDURE — 99284 EMERGENCY DEPT VISIT MOD MDM: CPT

## 2019-08-20 PROCEDURE — 73700 CT LOWER EXTREMITY W/O DYE: CPT | Mod: 26,RT

## 2019-08-20 PROCEDURE — 73552 X-RAY EXAM OF FEMUR 2/>: CPT | Mod: 26,RT

## 2019-08-20 PROCEDURE — 76377 3D RENDER W/INTRP POSTPROCES: CPT | Mod: 26

## 2019-08-20 RX ORDER — HYDROMORPHONE HYDROCHLORIDE 2 MG/ML
4 INJECTION INTRAMUSCULAR; INTRAVENOUS; SUBCUTANEOUS ONCE
Refills: 0 | Status: DISCONTINUED | OUTPATIENT
Start: 2019-08-20 | End: 2019-08-20

## 2019-08-20 RX ADMIN — HYDROMORPHONE HYDROCHLORIDE 4 MILLIGRAM(S): 2 INJECTION INTRAMUSCULAR; INTRAVENOUS; SUBCUTANEOUS at 23:23

## 2019-08-20 NOTE — ED ADULT NURSE NOTE - NSIMPLEMENTINTERV_GEN_ALL_ED
Implemented All Fall Risk Interventions:  [place] to call system. Call bell, personal items and telephone within reach. Instruct patient to call for assistance. Room bathroom lighting operational. Non-slip footwear when patient is off stretcher. Physically safe environment: no spills, clutter or unnecessary equipment. Stretcher in lowest position, wheels locked, appropriate side rails in place. Provide visual cue, wrist band, yellow gown, etc. Monitor gait and stability. Monitor for mental status changes and reorient to person, place, and time. Review medications for side effects contributing to fall risk. Reinforce activity limits and safety measures with patient and family.

## 2019-08-20 NOTE — ED PROVIDER NOTE - OBJECTIVE STATEMENT
69 y/o female with pmhx of a-fib, HLD, multiple myeloma, scleromyxedema, and pshx of laminectomy, left hip joint replacement, and arthroscopy of left knee presents for CT scan callback s/p fall yesterday. +pain behind right knee, weakness in right leg, and swelling. Pt states she had a mechanical trip and fall yesterday, landing on her right side. Pt was taken to [facility] yesterday and evaluated by Dr. Rosado. Performed XR and pt was dx. States she was contacted by ortho to return to the ED for imaging due to concern for possible fx at bottom of femur above knee. Pt is still having some pain today. Notes knee was "locking" and "going in and out of place" yesterday but not today. Pt is able to ambulate with a walker but notes she typically walks with a cane. Endorses Tylenol around 13:30 today. Denies back pain, new numbness, or new weakness in left leg. Tentanus status unknown. 67 y/o female with pmhx of a-fib, HLD, multiple myeloma, scleromyxedema, and pshx of laminectomy, left hip joint replacement, and arthroscopy of left knee presents for CT scan callback s/p fall yesterday. +pain behind right knee, weakness in right leg, and swelling. Pt states she had a mechanical trip and fall yesterday, landing on her right side. Pt was taken to [facility]; performed XR of rt knee, tibia and fibula, ankle, and foot +CT head and d/c. States she was contacted by ortho to return to the ED for imaging due to concern for possible fx at bottom of femur above knee. Pt is still having some pain today. Notes knee was "locking" and "going in and out of place" yesterday but not today. Pt is able to ambulate with a walker but notes she typically walks with a cane. Endorses Tylenol around 13:30 today. Denies back pain, new numbness, or new weakness in left leg. Tetanus status unknown.

## 2019-08-20 NOTE — ED ADULT TRIAGE NOTE - CHIEF COMPLAINT QUOTE
called back for CT R femur r/o fx, (seen at [facility] ED s/p fall, xray: neg), +ambulatory, pain controlled with tylenol, +on xarelto for afib

## 2019-08-20 NOTE — CONSULT NOTE ADULT - ASSESSMENT
68y Female w/ nondisplaced R distal femur fracture    - NWB RLE in KI. Patient will eventually need hinged knee brace to allow ROM  - No acute orthopedic surgical intervention  - PT/OT  - pain control  - FU with Dr. Burroughs in 1 week.

## 2019-08-20 NOTE — ED ADULT NURSE NOTE - CHIEF COMPLAINT QUOTE
called back for CT R femur r/o fx, (seen at [facility] ED s/p fall, xray: neg), +ambulatory, pain controlled with tylenol, +on xarelto for afib

## 2019-08-20 NOTE — ED POST DISCHARGE NOTE - DETAILS
spoke with patient, recommended she return to the ER for more imaging and orthopedic evaluation, patient demonstrates understanding that there may be a fracture and that she should return today - Jes Willard PA-C

## 2019-08-20 NOTE — CONSULT NOTE ADULT - SUBJECTIVE AND OBJECTIVE BOX
Orthopaedic Surgery Consult Note    Chief Complaint:    HPI:  68yFemale hx of a-fib, HLD, MM, amyloidosis, scleromyxedema, laminectomy and lumbar PSF in 2004, Right JOSE M in 2012 and L JOSE M in 2008 by Dr. Disla sp mechanical fall yesterday on right side complaining of right knee pain and difficulty bearing weight. Came to ED yesterday and said home, however final radiology read was concerning for nondisplaced fracture of right distal femur so patient was called back in. Denies weakness, numbness, tingling. Denies other injury. Denies f/c. States she walks with cane/walker at baseline. States that occasionally uses wheelchair and has adequate help at home.    ROS: As documented in HPI, otherwise negative.    PAST MEDICAL & SURGICAL HISTORY:  Scleromyxedema  Multiple myeloma  Atrial fibrillation  Transplants: Stem cell transplant March 2001  Hyperlipidemia  Hip Joint Replacement: left hip replacement  Knee Problem: Arthroscopy left knee  Backpain: Laminectomy    [] No significant past history as reviewed with the patient and family    MEDICATIONS  (STANDING):    MEDICATIONS  (PRN):    Allergies    strawberry (Pruritus; Rash)  sulfa drugs (Unknown)    Intolerances    adhesives (Rash)  epinephrine (Unknown)      Vital Signs Last 24 Hrs  T(C): 37.3 (20 Aug 2019 22:00), Max: 37.3 (20 Aug 2019 22:00)  T(F): 99.1 (20 Aug 2019 22:00), Max: 99.1 (20 Aug 2019 22:00)  HR: 76 (20 Aug 2019 22:00) (72 - 90)  BP: 136/89 (20 Aug 2019 22:00) (136/89 - 146/86)  BP(mean): --  RR: 18 (20 Aug 2019 22:00) (16 - 19)  SpO2: 95% (20 Aug 2019 22:00) (95% - 100%)      PHYSICAL EXAM:      Gen: awake, alert, NAD  Resp: no increased work of breathing  RLE:  - no open wounds  - no swelling  - no brusing  - TTP right distal femur  - able to range right knee with minimal pain  +EHL/FHL/TA/GS  SILT L3-S1  +DP/PT Pulses  Compartments soft  No calf TTP                           10.3   6.4   )-----------( 178      ( 20 Aug 2019 20:06 )             31.3       Phos  2.9     08-20  Mg     1.8     08-20      PT/INR - ( 20 Aug 2019 20:06 )   PT: 17.8 sec;   INR: 1.53 ratio         PTT - ( 20 Aug 2019 20:06 )  PTT:>200.0 sec      IMAGING STUDIES:    Imaging demonstrating nondisplaced fracture of distal femur

## 2019-08-20 NOTE — ED PROVIDER NOTE - MUSCULOSKELETAL MINIMAL EXAM
5/5 strength in plantar flexion bilaterally. 5/5 dorsi flexion in right. 4/5 dorsi flexion on left. Limited flexion of right hip secondary to weakness vs. pain. Positive diffuse swelling to right knee with palpable effusion. Positive tenderness to palpation along joint line superior and inferior of left knee along lateral side. Limited flexion/extension secondary to pain.

## 2019-08-20 NOTE — ED PROVIDER NOTE - PROGRESS NOTE DETAILS
Fracture confirmed on CT. Pt placed in knee immobilizer, to be non weight bearing. I do not believe pt is a safe discharge given restriction in movement. Plan to admit overnight for PT in AM. Ortho will be able to obtain a flexible knee brace tomorrow. - Pedro Quesada MD

## 2019-08-20 NOTE — ED PROVIDER NOTE - CLINICAL SUMMARY MEDICAL DECISION MAKING FREE TEXT BOX
Concern for fracture. NV intact. Plan to consult ortho, pt will likely need MRI, however to appreciate ortho recs. Obtain basic labs as pt will likely require admission. - Pedro Quesada MD

## 2019-08-20 NOTE — ED ADULT NURSE NOTE - OBJECTIVE STATEMENT
67 y/o female s/p trip and fall yesterday. Was evaluated yesterday and sent in for abnormal xray. Denies hitting Head, LOC, chest pain, sob, ha, n/v/d, abdominal pain, f/c, urinary symptoms, hematuria. C/o right leg pain from femur to calf. Ambulates with walker in pain. A&Ox4, vss, skin warm dry and intact, MAEx4, lungs CTA, abd soft nondistended, right leg swelling states that normal for her. Pt resting comfortably with VSS, no complaints at this time. Patient's bed in the lowest position, explained plan of care to patient and family members. Will continue to reassess. 69 y/o female s/p trip and fall yesterday. Was evaluated yesterday and sent in for abnormal xray. Denies hitting Head, LOC, chest pain, sob, ha, n/v/d, abdominal pain, f/c, urinary symptoms, hematuria. C/o right leg pain from femur to calf. Ambulates with walker in pain. A&Ox4, vss, skin warm dry and intact, MAEx4, lungs CTA, abd soft nondistended, right leg swelling states that normal for her, also states she has baseline neuropathy that has not changed. Pt resting comfortably with VSS, no complaints at this time. Patient's bed in the lowest position, explained plan of care to patient and family members. Will continue to reassess.

## 2019-08-21 DIAGNOSIS — S72.491A OTHER FRACTURE OF LOWER END OF RIGHT FEMUR, INITIAL ENCOUNTER FOR CLOSED FRACTURE: ICD-10-CM

## 2019-08-21 DIAGNOSIS — Z29.9 ENCOUNTER FOR PROPHYLACTIC MEASURES, UNSPECIFIED: ICD-10-CM

## 2019-08-21 DIAGNOSIS — I48.91 UNSPECIFIED ATRIAL FIBRILLATION: ICD-10-CM

## 2019-08-21 DIAGNOSIS — D53.9 NUTRITIONAL ANEMIA, UNSPECIFIED: ICD-10-CM

## 2019-08-21 DIAGNOSIS — C90.00 MULTIPLE MYELOMA NOT HAVING ACHIEVED REMISSION: ICD-10-CM

## 2019-08-21 LAB
ANION GAP SERPL CALC-SCNC: 9 MMOL/L — SIGNIFICANT CHANGE UP (ref 5–17)
APTT BLD: >200 SEC (ref 27.5–36.3)
BASOPHILS # BLD AUTO: 0.01 K/UL — SIGNIFICANT CHANGE UP (ref 0–0.2)
BASOPHILS NFR BLD AUTO: 0.2 % — SIGNIFICANT CHANGE UP (ref 0–2)
BUN SERPL-MCNC: 11 MG/DL — SIGNIFICANT CHANGE UP (ref 7–23)
CALCIUM SERPL-MCNC: 8.3 MG/DL — LOW (ref 8.4–10.5)
CHLORIDE SERPL-SCNC: 108 MMOL/L — SIGNIFICANT CHANGE UP (ref 96–108)
CO2 SERPL-SCNC: 23 MMOL/L — SIGNIFICANT CHANGE UP (ref 22–31)
CREAT SERPL-MCNC: 0.5 MG/DL — SIGNIFICANT CHANGE UP (ref 0.5–1.3)
ELLIPTOCYTES BLD QL SMEAR: SLIGHT — SIGNIFICANT CHANGE UP
EOSINOPHIL # BLD AUTO: 0 K/UL — SIGNIFICANT CHANGE UP (ref 0–0.5)
EOSINOPHIL NFR BLD AUTO: 0 % — SIGNIFICANT CHANGE UP (ref 0–6)
GLUCOSE SERPL-MCNC: 98 MG/DL — SIGNIFICANT CHANGE UP (ref 70–99)
HCT VFR BLD CALC: 28.3 % — LOW (ref 34.5–45)
HGB BLD-MCNC: 9.1 G/DL — LOW (ref 11.5–15.5)
IMM GRANULOCYTES NFR BLD AUTO: 0.3 % — SIGNIFICANT CHANGE UP (ref 0–1.5)
INR BLD: 1.82 RATIO — HIGH (ref 0.88–1.16)
LYMPHOCYTES # BLD AUTO: 0.61 K/UL — LOW (ref 1–3.3)
LYMPHOCYTES # BLD AUTO: 9.4 % — LOW (ref 13–44)
MACROCYTES BLD QL: SLIGHT — SIGNIFICANT CHANGE UP
MAGNESIUM SERPL-MCNC: 1.7 MG/DL — SIGNIFICANT CHANGE UP (ref 1.6–2.6)
MANUAL SMEAR VERIFICATION: SIGNIFICANT CHANGE UP
MCHC RBC-ENTMCNC: 32.2 GM/DL — SIGNIFICANT CHANGE UP (ref 32–36)
MCHC RBC-ENTMCNC: 33.3 PG — SIGNIFICANT CHANGE UP (ref 27–34)
MCV RBC AUTO: 103.7 FL — HIGH (ref 80–100)
MONOCYTES # BLD AUTO: 0.55 K/UL — SIGNIFICANT CHANGE UP (ref 0–0.9)
MONOCYTES NFR BLD AUTO: 8.5 % — SIGNIFICANT CHANGE UP (ref 2–14)
NEUTROPHILS # BLD AUTO: 5.29 K/UL — SIGNIFICANT CHANGE UP (ref 1.8–7.4)
NEUTROPHILS NFR BLD AUTO: 81.6 % — HIGH (ref 43–77)
PHOSPHATE SERPL-MCNC: 2.8 MG/DL — SIGNIFICANT CHANGE UP (ref 2.5–4.5)
PLAT MORPH BLD: NORMAL — SIGNIFICANT CHANGE UP
PLATELET # BLD AUTO: 154 K/UL — SIGNIFICANT CHANGE UP (ref 150–400)
POLYCHROMASIA BLD QL SMEAR: SLIGHT — SIGNIFICANT CHANGE UP
POTASSIUM SERPL-MCNC: 3.4 MMOL/L — LOW (ref 3.5–5.3)
POTASSIUM SERPL-SCNC: 3.4 MMOL/L — LOW (ref 3.5–5.3)
PROTHROM AB SERPL-ACNC: 21.1 SEC — HIGH (ref 10–13.1)
RBC # BLD: 2.73 M/UL — LOW (ref 3.8–5.2)
RBC # FLD: 16 % — HIGH (ref 10.3–14.5)
RBC BLD AUTO: NORMAL — SIGNIFICANT CHANGE UP
SODIUM SERPL-SCNC: 140 MMOL/L — SIGNIFICANT CHANGE UP (ref 135–145)
WBC # BLD: 6.48 K/UL — SIGNIFICANT CHANGE UP (ref 3.8–10.5)
WBC # FLD AUTO: 6.48 K/UL — SIGNIFICANT CHANGE UP (ref 3.8–10.5)

## 2019-08-21 PROCEDURE — 12345: CPT | Mod: NC

## 2019-08-21 PROCEDURE — 93010 ELECTROCARDIOGRAM REPORT: CPT | Mod: 77

## 2019-08-21 PROCEDURE — 99223 1ST HOSP IP/OBS HIGH 75: CPT

## 2019-08-21 PROCEDURE — 93010 ELECTROCARDIOGRAM REPORT: CPT

## 2019-08-21 RX ORDER — ACETAMINOPHEN 500 MG
650 TABLET ORAL EVERY 6 HOURS
Refills: 0 | Status: DISCONTINUED | OUTPATIENT
Start: 2019-08-21 | End: 2019-08-23

## 2019-08-21 RX ORDER — PREGABALIN 225 MG/1
1000 CAPSULE ORAL DAILY
Refills: 0 | Status: DISCONTINUED | OUTPATIENT
Start: 2019-08-21 | End: 2019-08-23

## 2019-08-21 RX ORDER — SOTALOL HCL 120 MG
80 TABLET ORAL DAILY
Refills: 0 | Status: DISCONTINUED | OUTPATIENT
Start: 2019-08-21 | End: 2019-08-23

## 2019-08-21 RX ORDER — HYDROMORPHONE HYDROCHLORIDE 2 MG/ML
4 INJECTION INTRAMUSCULAR; INTRAVENOUS; SUBCUTANEOUS EVERY 8 HOURS
Refills: 0 | Status: DISCONTINUED | OUTPATIENT
Start: 2019-08-21 | End: 2019-08-23

## 2019-08-21 RX ORDER — RIVAROXABAN 15 MG-20MG
20 KIT ORAL
Refills: 0 | Status: DISCONTINUED | OUTPATIENT
Start: 2019-08-21 | End: 2019-08-23

## 2019-08-21 RX ORDER — SOTALOL HCL 120 MG
120 TABLET ORAL AT BEDTIME
Refills: 0 | Status: DISCONTINUED | OUTPATIENT
Start: 2019-08-21 | End: 2019-08-23

## 2019-08-21 RX ORDER — DOCUSATE SODIUM 100 MG
100 CAPSULE ORAL
Refills: 0 | Status: DISCONTINUED | OUTPATIENT
Start: 2019-08-21 | End: 2019-08-23

## 2019-08-21 RX ORDER — VALACYCLOVIR 500 MG/1
500 TABLET, FILM COATED ORAL DAILY
Refills: 0 | Status: DISCONTINUED | OUTPATIENT
Start: 2019-08-21 | End: 2019-08-23

## 2019-08-21 RX ORDER — SENNA PLUS 8.6 MG/1
2 TABLET ORAL AT BEDTIME
Refills: 0 | Status: DISCONTINUED | OUTPATIENT
Start: 2019-08-21 | End: 2019-08-23

## 2019-08-21 RX ORDER — ENOXAPARIN SODIUM 100 MG/ML
40 INJECTION SUBCUTANEOUS DAILY
Refills: 0 | Status: DISCONTINUED | OUTPATIENT
Start: 2019-08-21 | End: 2019-08-21

## 2019-08-21 RX ADMIN — Medication 650 MILLIGRAM(S): at 18:25

## 2019-08-21 RX ADMIN — Medication 100 MILLIGRAM(S): at 11:49

## 2019-08-21 RX ADMIN — PREGABALIN 1000 MICROGRAM(S): 225 CAPSULE ORAL at 11:49

## 2019-08-21 RX ADMIN — HYDROMORPHONE HYDROCHLORIDE 4 MILLIGRAM(S): 2 INJECTION INTRAMUSCULAR; INTRAVENOUS; SUBCUTANEOUS at 22:40

## 2019-08-21 RX ADMIN — Medication 650 MILLIGRAM(S): at 17:34

## 2019-08-21 RX ADMIN — Medication 80 MILLIGRAM(S): at 09:31

## 2019-08-21 RX ADMIN — Medication 650 MILLIGRAM(S): at 09:20

## 2019-08-21 RX ADMIN — RIVAROXABAN 20 MILLIGRAM(S): KIT at 17:36

## 2019-08-21 RX ADMIN — HYDROMORPHONE HYDROCHLORIDE 4 MILLIGRAM(S): 2 INJECTION INTRAMUSCULAR; INTRAVENOUS; SUBCUTANEOUS at 21:47

## 2019-08-21 RX ADMIN — VALACYCLOVIR 500 MILLIGRAM(S): 500 TABLET, FILM COATED ORAL at 11:49

## 2019-08-21 RX ADMIN — Medication 120 MILLIGRAM(S): at 21:25

## 2019-08-21 RX ADMIN — Medication 650 MILLIGRAM(S): at 08:20

## 2019-08-21 NOTE — PROGRESS NOTE ADULT - PROBLEM SELECTOR PLAN 3
Continue prophylactic dose of Valtrex  Patient with chronic neuropathy: continue Dilaudid  ISTOP Reference #: [id number]

## 2019-08-21 NOTE — PHYSICAL THERAPY INITIAL EVALUATION ADULT - PRECAUTIONS/LIMITATIONS, REHAB EVAL
She presented to the ED on 8/19 where she underwent an Xray of the Right knee, Tib/Fib and Foot and CT head. Imaging was reviewed and was sent home. She endorsed right knee pain with associated swelling. She had difficulty ambulating with her walker and did not ambulate much through the course of the day. She was contacted earlier today where she had to return to the ED for advanced imaging as the final reads of her Xrays were suggestive of a fracture. +closed fracture of distal end of right femur. As per ortho NWB RLE in KI. Patient will eventually need hinged knee brace to allow ROM./fall precautions

## 2019-08-21 NOTE — H&P ADULT - ASSESSMENT
69 yo woman with plasma cell myeloma w/ amyloidosis (w/ pulmonary involvement), scleromyxedema, and Afib on Xarelto, chronic neuropathy, presents from a fall ~2 days ago with right leg pain found with nondisplaced RIGHT distal femur fracture.

## 2019-08-21 NOTE — H&P ADULT - NSHPLABSRESULTS_GEN_ALL_CORE
Personally reviewed labs and noted in detail below: chronic macrocytic anemia,  elevated coagulation panel    Reviewed imaging  < from: CT Femur No Cont, Right (08.20.19 @ 21:08) >    PROCEDURE DATE:  08/20/2019      ******PRELIMINARY REPORT******    ******PRELIMINARY REPORT******              INTERPRETATION:  [facility] RADIOLOGIST PRELIMINARY REPORT    EXAM:   CT Right Lower Extremity Without Contrast, Femur     EXAM DATE/TIME:   8/20/2019 8:54 PM     CLINICAL HISTORY:   68 years old, female; Pain; Knee; Right     TECHNIQUE:   Imaging protocol: CT of the Right lower extremity without contrast was   performed. Exam focused on the femur.     COMPARISON:   DX XR FEMUR 2 VIEWS RIGHT 8/20/2019 8:23 PM     FINDINGS:   Bones/joints: There is a subtle acute nondisplaced fracture through the   posterior cortex of the distal femoral diaphysis into the anterolateral   aspect   of the intercondylar fossa. Right hip prosthesis.   Soft tissues: Prepatellar/pretibial subcutaneous hematoma.   Lymph nodes: Right inguinal lymph nodes which do not exceed 6 mm in short   axis   demonstrate mild associated inflammation, compatible with lymphadenitis.   Intraperitoneal space: There is incompletely visualized stranding in the   extraperitoneal spaces of the right pelvis which could be related to   infection,   angioedema, or less likely hemorrhage.     IMPRESSION:   1. There is a subtle acute nondisplaced fracture through the posterior   cortex   of the distal femoral diaphysis into the anterolateral aspect of the   intercondylar fossa.   2. Right inguinal lymph nodes which do not exceed 6 mm in short axis   demonstrate mild associated inflammation, compatible with lymphadenitis.   3. There is incompletely visualized stranding in the extraperitoneal   spaces of   the right pelvis which could be related to infection, angioedema, or less   likely hemorrhage.      ******PRELIMINARY REPORT******      < end of copied text >

## 2019-08-21 NOTE — H&P ADULT - PROBLEM SELECTOR PLAN 3
Continue prophylactic dose of Valtrex  Patient with chronic neuropathy: continue Dilaudid  ISTOP Reference #: [id number]

## 2019-08-21 NOTE — H&P ADULT - NSHPREVIEWOFSYSTEMS_GEN_ALL_CORE
REVIEW OF SYSTEMS:  CONSTITUTIONAL: No fever, chills or sweats  EYES: No eye pain or visual disturbances  ENMT: No sinus or throat pain  NECK: No pain  RESPIRATORY: No cough, wheezing, or shortness of breath  CARDIOVASCULAR: No chest pain, palpitations, dizziness, or leg swelling  GASTROINTESTINAL: No abdominal pain. No nausea, vomiting, diarrhea or constipation. No melena or hematochezia.  GENITOURINARY: No dysuria,   NEUROLOGICAL: No headaches, loss of strength, or new numbness  SKIN: No rashes or lesions   LYMPH NODES: No enlarged glands  ENDOCRINE: No heat or cold intolerance; No hair loss  MUSCULOSKELETAL: See HPI  PSYCHIATRIC: No depression or anxiety  HEME/LYMPH: No easy bruising, or bleeding gums

## 2019-08-21 NOTE — H&P ADULT - ATTENDING COMMENTS
Patient was previously unknown to me. Patient was assigned to me by hospitalist in charge. My involvement in this case consisted only of the initial history, physical and management plan. Primary medicine day team to assume care in AM and thereafter. Case discussed in detail with overnight medicine NP/ARNIE Light [id number]

## 2019-08-21 NOTE — CHART NOTE - NSCHARTNOTEFT_GEN_A_CORE
Case discussed and imaging reviewed with Dr. Burroughs  plan is to treat conservatively, fracture is nondisplaced and does not require OR at this time   nonop, no acute orthopedic intervention   recommend FU XRays of distal femur/knee 203 days after patient is out of bed to chair   if patient gets discharged, should follow up with Dr. Burroughs in office

## 2019-08-21 NOTE — CHART NOTE - NSCHARTNOTEFT_GEN_A_CORE
Fit and apply James OR Merit hinged knee orthosis unlocked. Reviewed application skin precautions and care. Written instructions and contact information given. To notify office with any issues questions or concerns.  Navneet ENGLAND  [facility]  [phone]

## 2019-08-21 NOTE — H&P ADULT - NSICDXPASTMEDICALHX_GEN_ALL_CORE_FT
PAST MEDICAL HISTORY:  Atrial fibrillation     Hyperlipidemia     Multiple myeloma     Scleromyxedema     Transplants Stem cell transplant March 2001

## 2019-08-21 NOTE — PATIENT PROFILE ADULT - NSASFALLWHENOCCURRED_GEN_A_NUR
Message from [address] [place] [address]:   From: Anayeli Ho [address]: Tue Jan 30, 2018 5:29 PM   To: Marisela Ann Nurse Msg Pool  Subject: Medication Renewal Request  Parris Jerry would like a refill of the following medications:      Other - viagra    Preferred pharmacy: [address] [place] [address]  Delivery method: [employer] last six months

## 2019-08-21 NOTE — PHYSICAL THERAPY INITIAL EVALUATION ADULT - ADDITIONAL COMMENTS
PTA pt lived in apt, entry options are either down a ramp or down 3 steps +HR, pt used SC to ambulate, fully independent with ADLs and driving, walk in shower, owns raised toilet and shower chair, RW.

## 2019-08-21 NOTE — H&P ADULT - PROBLEM SELECTOR PLAN 1
Ortho consult reviewed and appreciated  - No acute orthopedic surgical intervention  - NWB RLE in KI. Patient will eventually need hinged knee brace to allow ROM  - PT/OT  - pain control:  Will continue with Dilaudid (patient states typically q24 hr for neuropathy). Will increase to q8hr prn for severe pain  -Bowel regimen

## 2019-08-21 NOTE — CHART NOTE - NSCHARTNOTEFT_GEN_A_CORE
Informed by RN of PTT >200; Patient on Xarelto for Atrial fibrillation.    67 yo woman with plasma cell myeloma w/ amyloidosis (w/ pulmonary involvement) on Darzalex, scleromyxedema, and Afib on Xarelto, chronic neuropathy, presents from a fall ~2 days ago.       INCOMPLETE Informed by RN of PTT >200; Patient on Xarelto for Atrial fibrillation.    69 yo woman with plasma cell myeloma w/ amyloidosis (w/ pulmonary involvement) on Darzalex, scleromyxedema, and Afib on Xarelto, chronic neuropathy, presents from a fall ~2 days ago.     Patient assessed and denies any signs of bleeding (nose, gum, urine); She is fully alert & oriented. CBC WNL    Will cont to monitor.      SABA Merida [id number]

## 2019-08-21 NOTE — PROGRESS NOTE ADULT - PROBLEM SELECTOR PLAN 1
- Per Ortho no acute orthopedic surgical intervention  - NWB RLE in KI. Patient will eventually need hinged knee brace to allow ROM  - PT reccs JACE  - pain control ; c/w  Dilaudid  q8hr prn for severe pain  -Bowel regimen

## 2019-08-21 NOTE — H&P ADULT - PROBLEM SELECTOR PLAN 2
Patient rate controlled per review of vitals  Check EKG  Continue Sotalol home regimen  Continue Xarelto

## 2019-08-21 NOTE — PHYSICAL THERAPY INITIAL EVALUATION ADULT - CRITERIA FOR SKILLED THERAPEUTIC INTERVENTIONS
rehab potential/predicted duration of therapy intervention/risk reduction/prevention/therapy frequency/anticipated discharge recommendation/impairments found/functional limitations in following categories

## 2019-08-21 NOTE — PHYSICAL THERAPY INITIAL EVALUATION ADULT - TRANSFER SAFETY CONCERNS NOTED: SIT/STAND, REHAB EVAL
inability to maintain weight-bearing restrictions w/o assist/decreased sequencing ability/losing balance/decreased weight-shifting ability/decreased step length

## 2019-08-21 NOTE — H&P ADULT - NSHPPHYSICALEXAM_GEN_ALL_CORE
Vital Signs Last 24 Hrs  T(C): 36.8 (21 Aug 2019 00:46), Max: 37.3 (20 Aug 2019 22:00)  T(F): 98.2 (21 Aug 2019 00:46), Max: 99.1 (20 Aug 2019 22:00)  HR: 87 (21 Aug 2019 00:46) (72 - 90)  BP: 134/77 (21 Aug 2019 00:46) (134/77 - 146/86)  BP(mean): --  RR: 18 (21 Aug 2019 00:46) (16 - 19)  SpO2: 95% (21 Aug 2019 00:46) (95% - 100%) Vital Signs Last 24 Hrs  T(C): 36.8 (21 Aug 2019 00:46), Max: 37.3 (20 Aug 2019 22:00)  T(F): 98.2 (21 Aug 2019 00:46), Max: 99.1 (20 Aug 2019 22:00)  HR: 87 (21 Aug 2019 00:46) (72 - 90)  BP: 134/77 (21 Aug 2019 00:46) (134/77 - 146/86)  BP(mean): --  RR: 18 (21 Aug 2019 00:46) (16 - 19)  SpO2: 95% (21 Aug 2019 00:46) (95% - 100%)      PHYSICAL EXAM:  GENERAL: NAD, well-groomed, well-developed  HEAD:  Atraumatic, Normocephalic  EYES: EOMI, PERRLA, conjunctiva and sclera clear  ENMT: Moist mucous membranes  NECK: Supple, No JVD, Normal thyroid  NERVOUS SYSTEM:  Alert & Oriented X3, Good concentration; Motor Strength 5/5 B/L upper and lower extremities  CHEST/LUNG: Clear to percussion bilaterally; No rales, rhonchi, or wheezing  HEART: Regular rate and rhythm; +Systolic murmur, rubs, or gallops  ABDOMEN: Soft, Nontender, Nondistended; Bowel sounds present  EXTREMITIES:  2+ Peripheral Pulses, No clubbing, cyanosis, or edema. Right leg currently in brace  LYMPH: No lymphadenopathy noted  SKIN: Warm and dry Vital Signs Last 24 Hrs  T(C): 36.8 (21 Aug 2019 00:46), Max: 37.3 (20 Aug 2019 22:00)  T(F): 98.2 (21 Aug 2019 00:46), Max: 99.1 (20 Aug 2019 22:00)  HR: 87 (21 Aug 2019 00:46) (72 - 90)  BP: 134/77 (21 Aug 2019 00:46) (134/77 - 146/86)  BP(mean): --  RR: 18 (21 Aug 2019 00:46) (16 - 19)  SpO2: 95% (21 Aug 2019 00:46) (95% - 100%)      PHYSICAL EXAM:  GENERAL: NAD, well-groomed, well-developed  HEAD:  Atraumatic, Normocephalic  EYES: EOMI, PERRLA, conjunctiva and sclera clear  ENMT: Moist mucous membranes  NECK: Supple, No JVD, Normal thyroid  NERVOUS SYSTEM:  Alert & Oriented X3, Good concentration; Motor Strength 5/5 B/L upper and lower extremities  CHEST/LUNG: Clear to percussion bilaterally; No rales, rhonchi, or wheezing. Right chest wall mediport: C/D/I  HEART: Regular rate and rhythm; +Systolic murmur, rubs, or gallops  ABDOMEN: Soft, Nontender, Nondistended; Bowel sounds present  EXTREMITIES:  2+ Peripheral Pulses, No clubbing, cyanosis, or edema. Right leg currently in brace  LYMPH: No lymphadenopathy noted  SKIN: Warm and dry

## 2019-08-21 NOTE — PROGRESS NOTE ADULT - SUBJECTIVE AND OBJECTIVE BOX
Patient is a 68y old  Female who presents with a chief complaint of right leg pain (21 Aug 2019 02:31)      SUBJECTIVE / OVERNIGHT EVENTS:    MEDICATIONS  (STANDING):  cyanocobalamin 1000 MICROGram(s) Oral daily  rivaroxaban 20 milliGRAM(s) Oral with dinner  sotalol 80 milliGRAM(s) Oral daily  sotalol 120 milliGRAM(s) Oral at bedtime  valACYclovir 500 milliGRAM(s) Oral daily    MEDICATIONS  (PRN):  acetaminophen   Tablet .. 650 milliGRAM(s) Oral every 6 hours PRN Mild Pain (1 - 3), Moderate Pain (4 - 6)  docusate sodium 100 milliGRAM(s) Oral two times a day PRN Constipation  HYDROmorphone   Tablet 4 milliGRAM(s) Oral every 8 hours PRN Severe Pain (7 - 10)  senna 2 Tablet(s) Oral at bedtime PRN Constipation      Vital Signs Last 24 Hrs  T(C): 36.7 (21 Aug 2019 04:26), Max: 37.3 (20 Aug 2019 22:00)  T(F): 98 (21 Aug 2019 04:26), Max: 99.1 (20 Aug 2019 22:00)  HR: 80 (21 Aug 2019 08:14) (72 - 90)  BP: 134/79 (21 Aug 2019 08:14) (130/72 - 146/86)  BP(mean): --  RR: 18 (21 Aug 2019 04:26) (16 - 19)  SpO2: 96% (21 Aug 2019 04:26) (95% - 100%)  CAPILLARY BLOOD GLUCOSE        I&O's Summary      PHYSICAL EXAM:  GENERAL: NAD, well-developed  HEAD:  Atraumatic, Normocephalic  EYES: EOMI, PERRLA, conjunctiva and sclera clear  NECK: Supple, No JVD  CHEST/LUNG: Clear to auscultation bilaterally; No wheeze  HEART: Regular rate and rhythm; No murmurs, rubs, or gallops  ABDOMEN: Soft, Nontender, Nondistended; Bowel sounds present  EXTREMITIES:  2+ Peripheral Pulses, No clubbing, cyanosis, or edema  PSYCH: AAOx3  NEUROLOGY: non-focal  SKIN: No rashes or lesions    LABS:                        10.3   6.4   )-----------( 178      ( 20 Aug 2019 20:06 )             31.3     08-21    140  |  108  |  11  ----------------------------<  98  3.4<L>   |  23  |  0.50    Ca    8.3<L>      21 Aug 2019 08:11  Phos  2.8     08-21  Mg     1.7     08-21    TPro  7.0  /  Alb  3.8  /  TBili  0.6  /  DBili  x   /  AST  20  /  ALT  10  /  AlkPhos  42  08-20    PT/INR - ( 20 Aug 2019 20:06 )   PT: 17.8 sec;   INR: 1.53 ratio         PTT - ( 20 Aug 2019 20:06 )  PTT:>200.0 sec          RADIOLOGY & ADDITIONAL TESTS:    Imaging Personally Reviewed:    Consultant(s) Notes Reviewed:      Care Discussed with Consultants/Other Providers: Patient is a 68y old  Female who presents with a chief complaint of right leg pain (21 Aug 2019 02:31)      SUBJECTIVE / OVERNIGHT EVENTS:  Pt seen and examined. No acute events overnight. She c/o right hip pain fairly well controlled with diluadid. PT seen at bedside ; reccs JACE.    MEDICATIONS  (STANDING):  cyanocobalamin 1000 MICROGram(s) Oral daily  rivaroxaban 20 milliGRAM(s) Oral with dinner  sotalol 80 milliGRAM(s) Oral daily  sotalol 120 milliGRAM(s) Oral at bedtime  valACYclovir 500 milliGRAM(s) Oral daily    MEDICATIONS  (PRN):  acetaminophen   Tablet .. 650 milliGRAM(s) Oral every 6 hours PRN Mild Pain (1 - 3), Moderate Pain (4 - 6)  docusate sodium 100 milliGRAM(s) Oral two times a day PRN Constipation  HYDROmorphone   Tablet 4 milliGRAM(s) Oral every 8 hours PRN Severe Pain (7 - 10)  senna 2 Tablet(s) Oral at bedtime PRN Constipation      Vital Signs Last 24 Hrs  T(C): 36.7 (21 Aug 2019 04:26), Max: 37.3 (20 Aug 2019 22:00)  T(F): 98 (21 Aug 2019 04:26), Max: 99.1 (20 Aug 2019 22:00)  HR: 80 (21 Aug 2019 08:14) (72 - 90)  BP: 134/79 (21 Aug 2019 08:14) (130/72 - 146/86)  BP(mean): --  RR: 18 (21 Aug 2019 04:26) (16 - 19)  SpO2: 96% (21 Aug 2019 04:26) (95% - 100%)  CAPILLARY BLOOD GLUCOSE        I&O's Summary      PHYSICAL EXAM:  GENERAL: NAD, anicteric, afebrile  HEAD:  Atraumatic, Normocephalic  EYES: EOMI, PERRLA, conjunctiva and sclera clear  NECK: Supple, No JVD  CHEST/LUNG: Clear to auscultation bilaterally; No wheeze  HEART: Regular rate and rhythm; +systolic murmur  ABDOMEN: Soft, Nontender, Nondistended; Bowel sounds present  EXTREMITIES:  right knee immobilizer in place  PSYCH: AAOx3  NEUROLOGY: ROM limited by pain  SKIN: No rashes or lesions    LABS:                        10.3   6.4   )-----------( 178      ( 20 Aug 2019 20:06 )             31.3     08-21    140  |  108  |  11  ----------------------------<  98  3.4<L>   |  23  |  0.50    Ca    8.3<L>      21 Aug 2019 08:11  Phos  2.8     08-21  Mg     1.7     08-21    TPro  7.0  /  Alb  3.8  /  TBili  0.6  /  DBili  x   /  AST  20  /  ALT  10  /  AlkPhos  42  08-20    PT/INR - ( 20 Aug 2019 20:06 )   PT: 17.8 sec;   INR: 1.53 ratio         PTT - ( 20 Aug 2019 20:06 )  PTT:>200.0 sec            Consultant(s) Notes Reviewed:  Orthopedics

## 2019-08-21 NOTE — H&P ADULT - HISTORY OF PRESENT ILLNESS
69 yo woman with plasma cell myeloma w/ amyloidosis (w/ pulmonary involvement) on Darzalex, scleromyxedema, and Afib on Xarelto, chronic neuropathy, presents from a fall ~2 days ago. Patient had a mechanical fall where she was walking down the kitchen after leaving her doctor's office. She was utilizing her cane and tripped falling onto her Right side. She presented to the ED on 8/19 where she underwent an Xray of the Right knee, Tib/Fib and Foot and CT head. Imaging was reviewed and was sent home. She endorsed right knee pain with associated swelling. She had difficulty ambulating with her walker and did not ambulate much through the course of the day. She was contacted earlier today where she had to return to the ED for advanced imaging as the final reads of her Xrays were suggestive of a fracture. Denies new back pain or other extremity pain.

## 2019-08-21 NOTE — H&P ADULT - PROBLEM SELECTOR PLAN 4
Chronic anemia  Per patient no signs or symptoms suggestive of bleed  Macrocytosis on CBC: Continue Vit B12  Trend

## 2019-08-21 NOTE — PHYSICAL THERAPY INITIAL EVALUATION ADULT - PERTINENT HX OF CURRENT PROBLEM, REHAB EVAL
Pt is a 67 yo woman with plasma cell myeloma w/ amyloidosis (w/ pulmonary involvement) on Darzalex, scleromyxedema, and Afib on Xarelto, chronic neuropathy, presents from a fall ~2 days ago. Patient had a mechanical fall where she was walking down the kitchen after leaving her doctor's office. She was utilizing her cane and tripped falling onto her Right side.

## 2019-08-21 NOTE — PROGRESS NOTE ADULT - ASSESSMENT
67 yo woman with plasma cell myeloma w/ amyloidosis (w/ pulmonary involvement), scleromyxedema, and Afib on Xarelto, chronic neuropathy, presents from a fall ~2 days ago with right leg pain found with nondisplaced RIGHT distal femur fracture.

## 2019-08-21 NOTE — H&P ADULT - NSICDXPASTSURGICALHX_GEN_ALL_CORE_FT
PAST SURGICAL HISTORY:  Backpain Laminectomy    Hip Joint Replacement left and right hip replacement    Knee Problem Arthroscopy left knee

## 2019-08-22 ENCOUNTER — TRANSCRIPTION ENCOUNTER (OUTPATIENT)
Age: 69
End: 2019-08-22

## 2019-08-22 LAB
ANION GAP SERPL CALC-SCNC: 10 MMOL/L — SIGNIFICANT CHANGE UP (ref 5–17)
APTT BLD: 37.2 SEC — HIGH (ref 27.5–36.3)
BUN SERPL-MCNC: 12 MG/DL — SIGNIFICANT CHANGE UP (ref 7–23)
CALCIUM SERPL-MCNC: 8.5 MG/DL — SIGNIFICANT CHANGE UP (ref 8.4–10.5)
CHLORIDE SERPL-SCNC: 105 MMOL/L — SIGNIFICANT CHANGE UP (ref 96–108)
CK MB BLD-MCNC: 2.8 % — SIGNIFICANT CHANGE UP (ref 0–3.5)
CK MB CFR SERPL CALC: 1 NG/ML — SIGNIFICANT CHANGE UP (ref 0–3.8)
CK SERPL-CCNC: 36 U/L — SIGNIFICANT CHANGE UP (ref 25–170)
CO2 SERPL-SCNC: 23 MMOL/L — SIGNIFICANT CHANGE UP (ref 22–31)
CREAT SERPL-MCNC: 0.53 MG/DL — SIGNIFICANT CHANGE UP (ref 0.5–1.3)
GLUCOSE SERPL-MCNC: 108 MG/DL — HIGH (ref 70–99)
HCT VFR BLD CALC: 27.4 % — LOW (ref 34.5–45)
HCV AB S/CO SERPL IA: 0.06 S/CO — SIGNIFICANT CHANGE UP (ref 0–0.99)
HCV AB SERPL-IMP: SIGNIFICANT CHANGE UP
HGB BLD-MCNC: 8.8 G/DL — LOW (ref 11.5–15.5)
INR BLD: 1.65 RATIO — HIGH (ref 0.88–1.16)
MCHC RBC-ENTMCNC: 32.1 GM/DL — SIGNIFICANT CHANGE UP (ref 32–36)
MCHC RBC-ENTMCNC: 33.8 PG — SIGNIFICANT CHANGE UP (ref 27–34)
MCV RBC AUTO: 105.4 FL — HIGH (ref 80–100)
PLATELET # BLD AUTO: 145 K/UL — LOW (ref 150–400)
POTASSIUM SERPL-MCNC: 3.5 MMOL/L — SIGNIFICANT CHANGE UP (ref 3.5–5.3)
POTASSIUM SERPL-SCNC: 3.5 MMOL/L — SIGNIFICANT CHANGE UP (ref 3.5–5.3)
PROTHROM AB SERPL-ACNC: 19.1 SEC — HIGH (ref 10–13.1)
RBC # BLD: 2.6 M/UL — LOW (ref 3.8–5.2)
RBC # FLD: 15.9 % — HIGH (ref 10.3–14.5)
SODIUM SERPL-SCNC: 138 MMOL/L — SIGNIFICANT CHANGE UP (ref 135–145)
TROPONIN T, HIGH SENSITIVITY RESULT: 14 NG/L — SIGNIFICANT CHANGE UP (ref 0–51)
WBC # BLD: 8.05 K/UL — SIGNIFICANT CHANGE UP (ref 3.8–10.5)
WBC # FLD AUTO: 8.05 K/UL — SIGNIFICANT CHANGE UP (ref 3.8–10.5)

## 2019-08-22 PROCEDURE — 71045 X-RAY EXAM CHEST 1 VIEW: CPT | Mod: 26

## 2019-08-22 PROCEDURE — 93010 ELECTROCARDIOGRAM REPORT: CPT

## 2019-08-22 PROCEDURE — 99232 SBSQ HOSP IP/OBS MODERATE 35: CPT

## 2019-08-22 RX ORDER — FAMOTIDINE 10 MG/ML
20 INJECTION INTRAVENOUS ONCE
Refills: 0 | Status: COMPLETED | OUTPATIENT
Start: 2019-08-22 | End: 2019-08-22

## 2019-08-22 RX ORDER — SODIUM CHLORIDE 9 MG/ML
250 INJECTION INTRAMUSCULAR; INTRAVENOUS; SUBCUTANEOUS ONCE
Refills: 0 | Status: COMPLETED | OUTPATIENT
Start: 2019-08-22 | End: 2019-08-22

## 2019-08-22 RX ADMIN — RIVAROXABAN 20 MILLIGRAM(S): KIT at 18:12

## 2019-08-22 RX ADMIN — Medication 650 MILLIGRAM(S): at 09:39

## 2019-08-22 RX ADMIN — Medication 650 MILLIGRAM(S): at 10:09

## 2019-08-22 RX ADMIN — HYDROMORPHONE HYDROCHLORIDE 4 MILLIGRAM(S): 2 INJECTION INTRAMUSCULAR; INTRAVENOUS; SUBCUTANEOUS at 22:30

## 2019-08-22 RX ADMIN — VALACYCLOVIR 500 MILLIGRAM(S): 500 TABLET, FILM COATED ORAL at 12:48

## 2019-08-22 RX ADMIN — FAMOTIDINE 20 MILLIGRAM(S): 10 INJECTION INTRAVENOUS at 07:25

## 2019-08-22 RX ADMIN — Medication 120 MILLIGRAM(S): at 21:50

## 2019-08-22 RX ADMIN — Medication 650 MILLIGRAM(S): at 18:14

## 2019-08-22 RX ADMIN — Medication 80 MILLIGRAM(S): at 09:34

## 2019-08-22 RX ADMIN — HYDROMORPHONE HYDROCHLORIDE 4 MILLIGRAM(S): 2 INJECTION INTRAMUSCULAR; INTRAVENOUS; SUBCUTANEOUS at 21:59

## 2019-08-22 RX ADMIN — SODIUM CHLORIDE 750 MILLILITER(S): 9 INJECTION INTRAMUSCULAR; INTRAVENOUS; SUBCUTANEOUS at 14:33

## 2019-08-22 RX ADMIN — PREGABALIN 1000 MICROGRAM(S): 225 CAPSULE ORAL at 12:48

## 2019-08-22 NOTE — DISCHARGE NOTE PROVIDER - PROVIDER TOKENS
PROVIDER:[TOKEN:[[id number]]] PROVIDER:[TOKEN:[[id number]]],PROVIDER:[TOKEN:[[id number]],FOLLOWUP:[2 weeks]],PROVIDER:[TOKEN:[[id number]],FOLLOWUP:[2 weeks]]

## 2019-08-22 NOTE — PROGRESS NOTE ADULT - PROBLEM SELECTOR PLAN 1
- Per Ortho no acute orthopedic surgical intervention  - NWB RLE in KI. Patient will eventually need hinged knee brace to allow ROM  - pain control ; c/w  Dilaudid  q8hr prn for severe pain  -Bowel regimen  - PT reccs JACE  - Discharge planning in progress

## 2019-08-22 NOTE — PROGRESS NOTE ADULT - PROBLEM SELECTOR PLAN 3
Continue prophylactic dose of Valtrex  Patient with chronic neuropathy: continue Dilaudid  ISTOP Reference #: [id number]

## 2019-08-22 NOTE — PROVIDER CONTACT NOTE (OTHER) - SITUATION
pt just now told me she was feeling palpatations again.pt has history of a fib and is on medication.YENNIFER Merida notified and v s taken repeat ekg to be done.cardiac enzymes to be drawn

## 2019-08-22 NOTE — DISCHARGE NOTE PROVIDER - HOSPITAL COURSE
69 yo female  with plasma cell myeloma w/ amyloidosis (w/ pulmonary involvement), scleromyxedema, and Afib on Xarelto, chronic neuropathy, presents from a fall ~2 days ago with right leg pain found with nondisplaced RIGHT distal femur fracture.        Closed fracture of distal end of right femur, initial encounter: Per Ortho no acute orthopedic surgical intervention    - NWB RLE in KI. Patient will eventually need hinged knee brace to allow ROM    - pain control ; c/w  Dilaudid  q8hr prn for severe pain    -Bowel regimen    - PT reccs JACE    - Discharge planning in progress for  Subacute Rehab today.         Atrial fibrillation : - EKG is NSR    - Continue Sotalol home regimen (80mg q am , 120mg q bedtime)    - Continue Xarelto.         Multiple myeloma : Continue prophylactic dose of Valtrex    Patient with chronic neuropathy: continue Dilaudid    ISTOP Reference #: [id number]    On Darzalex for amyloidosis q 2 weeks ; next dose is September 30 ; can be rescheduled    Pt follows with Hem/Onc Dr Ciro Cano.        Macrocytic anemia : Chronic anemia    Per patient no signs or symptoms suggestive of bleed    Macrocytosis on CBC ; continue Vit B12.        Prophylactic measure.  Plan: DVT ppx: On Xarelto.

## 2019-08-22 NOTE — PROGRESS NOTE ADULT - SUBJECTIVE AND OBJECTIVE BOX
Patient is a 68y old  Female who presents with a chief complaint of right leg pain (21 Aug 2019 09:57)      SUBJECTIVE / OVERNIGHT EVENTS:    MEDICATIONS  (STANDING):  cyanocobalamin 1000 MICROGram(s) Oral daily  rivaroxaban 20 milliGRAM(s) Oral with dinner  sotalol 80 milliGRAM(s) Oral daily  sotalol 120 milliGRAM(s) Oral at bedtime  valACYclovir 500 milliGRAM(s) Oral daily    MEDICATIONS  (PRN):  acetaminophen   Tablet .. 650 milliGRAM(s) Oral every 6 hours PRN Mild Pain (1 - 3), Moderate Pain (4 - 6)  docusate sodium 100 milliGRAM(s) Oral two times a day PRN Constipation  HYDROmorphone   Tablet 4 milliGRAM(s) Oral every 8 hours PRN Severe Pain (7 - 10)  senna 2 Tablet(s) Oral at bedtime PRN Constipation      Vital Signs Last 24 Hrs  T(C): 37.7 (22 Aug 2019 06:55), Max: 37.8 (21 Aug 2019 19:24)  T(F): 99.9 (22 Aug 2019 06:55), Max: 100 (21 Aug 2019 19:24)  HR: 91 (22 Aug 2019 06:55) (81 - 91)  BP: 128/62 (22 Aug 2019 06:55) (120/65 - 138/69)  BP(mean): --  RR: 18 (22 Aug 2019 06:55) (18 - 18)  SpO2: 96% (22 Aug 2019 06:55) (92% - 96%)  CAPILLARY BLOOD GLUCOSE        I&O's Summary    21 Aug 2019 07:01  -  22 Aug 2019 07:00  --------------------------------------------------------  IN: 480 mL / OUT: 2 mL / NET: 478 mL        PHYSICAL EXAM:  GENERAL: NAD, well-developed  HEAD:  Atraumatic, Normocephalic  EYES: EOMI, PERRLA, conjunctiva and sclera clear  NECK: Supple, No JVD  CHEST/LUNG: Clear to auscultation bilaterally; No wheeze  HEART: Regular rate and rhythm; No murmurs, rubs, or gallops  ABDOMEN: Soft, Nontender, Nondistended; Bowel sounds present  EXTREMITIES:  2+ Peripheral Pulses, No clubbing, cyanosis, or edema  PSYCH: AAOx3  NEUROLOGY: non-focal  SKIN: No rashes or lesions    LABS:                        8.8    8.05  )-----------( 145      ( 22 Aug 2019 08:18 )             27.4     08-22    138  |  105  |  12  ----------------------------<  108<H>  3.5   |  23  |  0.53    Ca    8.5      22 Aug 2019 06:41  Phos  2.8     08-21  Mg     1.7     08-21    TPro  7.0  /  Alb  3.8  /  TBili  0.6  /  DBili  x   /  AST  20  /  ALT  10  /  AlkPhos  42  08-20    PT/INR - ( 21 Aug 2019 10:53 )   PT: 21.1 sec;   INR: 1.82 ratio         PTT - ( 21 Aug 2019 10:53 )  PTT:>200.0 sec  CARDIAC MARKERS ( 22 Aug 2019 07:19 )  x     / x     / 36 U/L / x     / 1.0 ng/mL          RADIOLOGY & ADDITIONAL TESTS:    Imaging Personally Reviewed:    Consultant(s) Notes Reviewed:      Care Discussed with Consultants/Other Providers: Patient is a 68y old  Female who presents with a chief complaint of right leg pain (21 Aug 2019 09:57)      SUBJECTIVE / OVERNIGHT EVENTS:  Pt seen and examined. Spiked low grade temp last night Tmax 100F ; also c/o palpitations ( now resolved). EKG was NSR , hs trop negative,  Currently denies palpitations/cp/sob. No fever at this time.    MEDICATIONS  (STANDING):  cyanocobalamin 1000 MICROGram(s) Oral daily  rivaroxaban 20 milliGRAM(s) Oral with dinner  sotalol 80 milliGRAM(s) Oral daily  sotalol 120 milliGRAM(s) Oral at bedtime  valACYclovir 500 milliGRAM(s) Oral daily    MEDICATIONS  (PRN):  acetaminophen   Tablet .. 650 milliGRAM(s) Oral every 6 hours PRN Mild Pain (1 - 3), Moderate Pain (4 - 6)  docusate sodium 100 milliGRAM(s) Oral two times a day PRN Constipation  HYDROmorphone   Tablet 4 milliGRAM(s) Oral every 8 hours PRN Severe Pain (7 - 10)  senna 2 Tablet(s) Oral at bedtime PRN Constipation      Vital Signs Last 24 Hrs  T(C): 37.7 (22 Aug 2019 06:55), Max: 37.8 (21 Aug 2019 19:24)  T(F): 99.9 (22 Aug 2019 06:55), Max: 100 (21 Aug 2019 19:24)  HR: 91 (22 Aug 2019 06:55) (81 - 91)  BP: 128/62 (22 Aug 2019 06:55) (120/65 - 138/69)  BP(mean): --  RR: 18 (22 Aug 2019 06:55) (18 - 18)  SpO2: 96% (22 Aug 2019 06:55) (92% - 96%)  CAPILLARY BLOOD GLUCOSE        I&O's Summary    21 Aug 2019 07:01  -  22 Aug 2019 07:00  --------------------------------------------------------  IN: 480 mL / OUT: 2 mL / NET: 478 mL        PHYSICAL EXAM:  GENERAL: NAD, anicteric, afebrile  HEAD:  Atraumatic, Normocephalic  EYES: EOMI, PERRLA, conjunctiva and sclera clear  NECK: Supple, No JVD  CHEST/LUNG: Clear to auscultation bilaterally; No wheeze  HEART: Regular rate and rhythm; +systolic murmur  ABDOMEN: Soft, Nontender, Nondistended; Bowel sounds present  EXTREMITIES:  right knee immobilizer in place  PSYCH: AAOx3  NEUROLOGY: ROM limited by pain  SKIN: No rashes or lesions    LABS:                        8.8    8.05  )-----------( 145      ( 22 Aug 2019 08:18 )             27.4     08-22    138  |  105  |  12  ----------------------------<  108<H>  3.5   |  23  |  0.53    Ca    8.5      22 Aug 2019 06:41  Phos  2.8     08-21  Mg     1.7     08-21    TPro  7.0  /  Alb  3.8  /  TBili  0.6  /  DBili  x   /  AST  20  /  ALT  10  /  AlkPhos  42  08-20    PT/INR - ( 21 Aug 2019 10:53 )   PT: 21.1 sec;   INR: 1.82 ratio         PTT - ( 21 Aug 2019 10:53 )  PTT:>200.0 sec  CARDIAC MARKERS ( 22 Aug 2019 07:19 )  x     / x     / 36 U/L / x     / 1.0 ng/mL

## 2019-08-22 NOTE — DISCHARGE NOTE PROVIDER - CARE PROVIDER_API CALL
Patel Burroughs)  Orthopaedic Surgery  [address]  [place] [address]  Phone: [phone]  Fax: [phone]  Follow Up Time: Patel Burroughs)  Orthopaedic Surgery  [address]  [place] [address]  Phone: [phone]  Fax: [phone]  Follow Up Time:     Kaykay Hackett)  Cardiovascular Disease; Internal Medicine  [address]  [place] [address]  Phone: [phone]  Fax: [phone]  Follow Up Time: 2 weeks    Ciro Cano)  Hematology; Internal Medicine; Medical Oncology  [address], [address]  [place] [address]  Phone: [phone]  Fax: [phone]  Follow Up Time: 2 weeks

## 2019-08-22 NOTE — DISCHARGE NOTE PROVIDER - NSDCACTIVITY_GEN_ALL_CORE
Showering allowed/Walking - Indoors allowed/Walking - Outdoors allowed Walking - Outdoors allowed/No heavy lifting/straining/Walking - Indoors allowed/Showering allowed

## 2019-08-22 NOTE — DISCHARGE NOTE PROVIDER - NSDCCPCAREPLAN_GEN_ALL_CORE_FT
PRINCIPAL DISCHARGE DIAGNOSIS  Diagnosis: Other closed fracture of distal end of right femur, initial encounter  Assessment and Plan of Treatment: You were evaluated by Orthopedic surgeon.  No surgical intervention required at this time as this is a non-displaced fracture. Continue to wear leg brace.  follow up with Orthopedic surgeon (Dr Burroughs) in 1 week (while in Rehab). Call for appointment.      SECONDARY DISCHARGE DIAGNOSES  Diagnosis: Multiple myeloma  Assessment and Plan of Treatment: Follow up with Oncology after discharge from Rehab.    Diagnosis: Atrial fibrillation  Assessment and Plan of Treatment: Continue with Xarelto as prescribed and follow up with your primary healthcare provider after discharge from Rehab.    Diagnosis: Macrocytic anemia  Assessment and Plan of Treatment: Continue with treatment and follow up with your primary healthcare provider after discharge from Rehab.

## 2019-08-23 ENCOUNTER — TRANSCRIPTION ENCOUNTER (OUTPATIENT)
Age: 69
End: 2019-08-23

## 2019-08-23 VITALS
SYSTOLIC BLOOD PRESSURE: 103 MMHG | HEART RATE: 84 BPM | RESPIRATION RATE: 18 BRPM | DIASTOLIC BLOOD PRESSURE: 67 MMHG | OXYGEN SATURATION: 98 % | TEMPERATURE: 99 F

## 2019-08-23 LAB
APTT BLD: 190.6 SEC — CRITICAL HIGH (ref 27.5–36.3)
HCT VFR BLD CALC: 26.4 % — LOW (ref 34.5–45)
HGB BLD-MCNC: 8.7 G/DL — LOW (ref 11.5–15.5)
INR BLD: 1.73 RATIO — HIGH (ref 0.88–1.16)
MCHC RBC-ENTMCNC: 33 GM/DL — SIGNIFICANT CHANGE UP (ref 32–36)
MCHC RBC-ENTMCNC: 34.4 PG — HIGH (ref 27–34)
MCV RBC AUTO: 104.3 FL — HIGH (ref 80–100)
PLATELET # BLD AUTO: 142 K/UL — LOW (ref 150–400)
PROTHROM AB SERPL-ACNC: 20.2 SEC — HIGH (ref 10–13.1)
RBC # BLD: 2.53 M/UL — LOW (ref 3.8–5.2)
RBC # FLD: 15.9 % — HIGH (ref 10.3–14.5)
WBC # BLD: 9.21 K/UL — SIGNIFICANT CHANGE UP (ref 3.8–10.5)
WBC # FLD AUTO: 9.21 K/UL — SIGNIFICANT CHANGE UP (ref 3.8–10.5)

## 2019-08-23 PROCEDURE — 82550 ASSAY OF CK (CPK): CPT

## 2019-08-23 PROCEDURE — 73562 X-RAY EXAM OF KNEE 3: CPT

## 2019-08-23 PROCEDURE — 86803 HEPATITIS C AB TEST: CPT

## 2019-08-23 PROCEDURE — 82553 CREATINE MB FRACTION: CPT

## 2019-08-23 PROCEDURE — 73552 X-RAY EXAM OF FEMUR 2/>: CPT

## 2019-08-23 PROCEDURE — 93005 ELECTROCARDIOGRAM TRACING: CPT

## 2019-08-23 PROCEDURE — 84100 ASSAY OF PHOSPHORUS: CPT

## 2019-08-23 PROCEDURE — 70450 CT HEAD/BRAIN W/O DYE: CPT

## 2019-08-23 PROCEDURE — 85027 COMPLETE CBC AUTOMATED: CPT

## 2019-08-23 PROCEDURE — 84484 ASSAY OF TROPONIN QUANT: CPT

## 2019-08-23 PROCEDURE — 97116 GAIT TRAINING THERAPY: CPT

## 2019-08-23 PROCEDURE — 73630 X-RAY EXAM OF FOOT: CPT

## 2019-08-23 PROCEDURE — 85610 PROTHROMBIN TIME: CPT

## 2019-08-23 PROCEDURE — 73700 CT LOWER EXTREMITY W/O DYE: CPT

## 2019-08-23 PROCEDURE — 99285 EMERGENCY DEPT VISIT HI MDM: CPT

## 2019-08-23 PROCEDURE — 99284 EMERGENCY DEPT VISIT MOD MDM: CPT | Mod: 25

## 2019-08-23 PROCEDURE — 85730 THROMBOPLASTIN TIME PARTIAL: CPT

## 2019-08-23 PROCEDURE — 80048 BASIC METABOLIC PNL TOTAL CA: CPT

## 2019-08-23 PROCEDURE — 83735 ASSAY OF MAGNESIUM: CPT

## 2019-08-23 PROCEDURE — 73590 X-RAY EXAM OF LOWER LEG: CPT

## 2019-08-23 PROCEDURE — 73610 X-RAY EXAM OF ANKLE: CPT

## 2019-08-23 PROCEDURE — 76377 3D RENDER W/INTRP POSTPROCES: CPT

## 2019-08-23 PROCEDURE — 71045 X-RAY EXAM CHEST 1 VIEW: CPT

## 2019-08-23 PROCEDURE — 97162 PT EVAL MOD COMPLEX 30 MIN: CPT

## 2019-08-23 PROCEDURE — 97530 THERAPEUTIC ACTIVITIES: CPT

## 2019-08-23 PROCEDURE — 80053 COMPREHEN METABOLIC PANEL: CPT

## 2019-08-23 RX ORDER — SOTALOL HCL 120 MG
120 TABLET ORAL AT BEDTIME
Refills: 0 | Status: DISCONTINUED | OUTPATIENT
Start: 2019-08-23 | End: 2019-08-23

## 2019-08-23 RX ORDER — DOCUSATE SODIUM 100 MG
1 CAPSULE ORAL
Qty: 0 | Refills: 0 | DISCHARGE
Start: 2019-08-23

## 2019-08-23 RX ORDER — FAMOTIDINE 10 MG/ML
20 INJECTION INTRAVENOUS ONCE
Refills: 0 | Status: COMPLETED | OUTPATIENT
Start: 2019-08-23 | End: 2019-08-23

## 2019-08-23 RX ORDER — ACETAMINOPHEN 500 MG
2 TABLET ORAL
Qty: 0 | Refills: 0 | DISCHARGE
Start: 2019-08-23

## 2019-08-23 RX ORDER — HYDROMORPHONE HYDROCHLORIDE 2 MG/ML
1 INJECTION INTRAMUSCULAR; INTRAVENOUS; SUBCUTANEOUS
Qty: 0 | Refills: 0 | DISCHARGE

## 2019-08-23 RX ORDER — HYDROMORPHONE HYDROCHLORIDE 2 MG/ML
1 INJECTION INTRAMUSCULAR; INTRAVENOUS; SUBCUTANEOUS
Qty: 0 | Refills: 0 | DISCHARGE
Start: 2019-08-23

## 2019-08-23 RX ORDER — SOTALOL HCL 120 MG
80 TABLET ORAL DAILY
Refills: 0 | Status: DISCONTINUED | OUTPATIENT
Start: 2019-08-23 | End: 2019-08-23

## 2019-08-23 RX ORDER — SENNA PLUS 8.6 MG/1
2 TABLET ORAL
Qty: 0 | Refills: 0 | DISCHARGE
Start: 2019-08-23

## 2019-08-23 RX ADMIN — PREGABALIN 1000 MICROGRAM(S): 225 CAPSULE ORAL at 11:55

## 2019-08-23 RX ADMIN — VALACYCLOVIR 500 MILLIGRAM(S): 500 TABLET, FILM COATED ORAL at 11:55

## 2019-08-23 RX ADMIN — FAMOTIDINE 20 MILLIGRAM(S): 10 INJECTION INTRAVENOUS at 08:18

## 2019-08-23 RX ADMIN — Medication 650 MILLIGRAM(S): at 08:18

## 2019-08-23 NOTE — PROGRESS NOTE ADULT - PROBLEM SELECTOR PLAN 3
Continue prophylactic dose of Valtrex  Patient with chronic neuropathy: continue Dilaudid  ISTOP Reference #: [id number] Continue prophylactic dose of Valtrex  Patient with chronic neuropathy: continue Dilaudid  ISTOP Reference #: [id number]  On Darzalex for amyloidosis q 2 weeks ; next dose is September 30 ; can be rescheduled  Pt follows with Hem/Onc Dr Ciro Cano

## 2019-08-23 NOTE — PROGRESS NOTE ADULT - SUBJECTIVE AND OBJECTIVE BOX
Patient is a 68y old  Female who presents with a chief complaint of right leg pain (22 Aug 2019 18:17)      SUBJECTIVE / OVERNIGHT EVENTS:    MEDICATIONS  (STANDING):  cyanocobalamin 1000 MICROGram(s) Oral daily  rivaroxaban 20 milliGRAM(s) Oral with dinner  sotalol 80 milliGRAM(s) Oral daily  sotalol 120 milliGRAM(s) Oral at bedtime  valACYclovir 500 milliGRAM(s) Oral daily    MEDICATIONS  (PRN):  acetaminophen   Tablet .. 650 milliGRAM(s) Oral every 6 hours PRN Mild Pain (1 - 3), Moderate Pain (4 - 6)  docusate sodium 100 milliGRAM(s) Oral two times a day PRN Constipation  HYDROmorphone   Tablet 4 milliGRAM(s) Oral every 8 hours PRN Severe Pain (7 - 10)  senna 2 Tablet(s) Oral at bedtime PRN Constipation      Vital Signs Last 24 Hrs  T(C): 37.6 (23 Aug 2019 07:00), Max: 37.6 (22 Aug 2019 17:40)  T(F): 99.7 (23 Aug 2019 07:00), Max: 99.7 (23 Aug 2019 07:00)  HR: 82 (23 Aug 2019 07:00) (78 - 82)  BP: 106/58 (23 Aug 2019 07:00) (93/57 - 108/74)  BP(mean): --  RR: 18 (23 Aug 2019 07:00) (18 - 18)  SpO2: 96% (23 Aug 2019 07:00) (90% - 96%)  CAPILLARY BLOOD GLUCOSE        I&O's Summary      PHYSICAL EXAM:  GENERAL: NAD, well-developed  HEAD:  Atraumatic, Normocephalic  EYES: EOMI, PERRLA, conjunctiva and sclera clear  NECK: Supple, No JVD  CHEST/LUNG: Clear to auscultation bilaterally; No wheeze  HEART: Regular rate and rhythm; No murmurs, rubs, or gallops  ABDOMEN: Soft, Nontender, Nondistended; Bowel sounds present  EXTREMITIES:  2+ Peripheral Pulses, No clubbing, cyanosis, or edema  PSYCH: AAOx3  NEUROLOGY: non-focal  SKIN: No rashes or lesions    LABS:                        8.8    8.05  )-----------( 145      ( 22 Aug 2019 08:18 )             27.4     08-22    138  |  105  |  12  ----------------------------<  108<H>  3.5   |  23  |  0.53    Ca    8.5      22 Aug 2019 06:41      PT/INR - ( 22 Aug 2019 09:38 )   PT: 19.1 sec;   INR: 1.65 ratio         PTT - ( 22 Aug 2019 09:38 )  PTT:37.2 sec  CARDIAC MARKERS ( 22 Aug 2019 07:19 )  x     / x     / 36 U/L / x     / 1.0 ng/mL          RADIOLOGY & ADDITIONAL TESTS:    Imaging Personally Reviewed:    Consultant(s) Notes Reviewed:      Care Discussed with Consultants/Other Providers: Patient is a 68y old  Female who presents with a chief complaint of right leg pain (22 Aug 2019 18:17)      SUBJECTIVE / OVERNIGHT EVENTS:  Pt seen and examined. No acute events overnight. She denies RLE pain at this time.    MEDICATIONS  (STANDING):  cyanocobalamin 1000 MICROGram(s) Oral daily  rivaroxaban 20 milliGRAM(s) Oral with dinner  sotalol 80 milliGRAM(s) Oral daily  sotalol 120 milliGRAM(s) Oral at bedtime  valACYclovir 500 milliGRAM(s) Oral daily    MEDICATIONS  (PRN):  acetaminophen   Tablet .. 650 milliGRAM(s) Oral every 6 hours PRN Mild Pain (1 - 3), Moderate Pain (4 - 6)  docusate sodium 100 milliGRAM(s) Oral two times a day PRN Constipation  HYDROmorphone   Tablet 4 milliGRAM(s) Oral every 8 hours PRN Severe Pain (7 - 10)  senna 2 Tablet(s) Oral at bedtime PRN Constipation      Vital Signs Last 24 Hrs  T(C): 37.6 (23 Aug 2019 07:00), Max: 37.6 (22 Aug 2019 17:40)  T(F): 99.7 (23 Aug 2019 07:00), Max: 99.7 (23 Aug 2019 07:00)  HR: 82 (23 Aug 2019 07:00) (78 - 82)  BP: 106/58 (23 Aug 2019 07:00) (93/57 - 108/74)  BP(mean): --  RR: 18 (23 Aug 2019 07:00) (18 - 18)  SpO2: 96% (23 Aug 2019 07:00) (90% - 96%)  CAPILLARY BLOOD GLUCOSE        I&O's Summary      PHYSICAL EXAM:  GENERAL: NAD, anicteric, afebrile  HEAD:  Atraumatic, Normocephalic  EYES: EOMI, PERRLA, conjunctiva and sclera clear  NECK: Supple, No JVD  CHEST/LUNG: Clear to auscultation bilaterally; No wheeze  HEART: Regular rate and rhythm; +systolic murmur  ABDOMEN: Soft, Nontender, Nondistended; Bowel sounds present  EXTREMITIES:  right knee immobilizer in place  PSYCH: AAOx3  NEUROLOGY: ROM limited by pain  SKIN: No rashes or lesions    LABS:                        8.8    8.05  )-----------( 145      ( 22 Aug 2019 08:18 )             27.4     08-22    138  |  105  |  12  ----------------------------<  108<H>  3.5   |  23  |  0.53    Ca    8.5      22 Aug 2019 06:41      PT/INR - ( 22 Aug 2019 09:38 )   PT: 19.1 sec;   INR: 1.65 ratio         PTT - ( 22 Aug 2019 09:38 )  PTT:37.2 sec  CARDIAC MARKERS ( 22 Aug 2019 07:19 )  x     / x     / 36 U/L / x     / 1.0 ng/mL

## 2019-08-23 NOTE — PROVIDER CONTACT NOTE (CRITICAL VALUE NOTIFICATION) - BACKGROUND
pt admitted for fracture of right femur
PMH: scleromysedema, multiple myeloma, A fib on rivaroxaban, transplants, hyperlipidemia.

## 2019-08-23 NOTE — PROVIDER CONTACT NOTE (CRITICAL VALUE NOTIFICATION) - ASSESSMENT
Parminder ordered qD. APTT greater than 200 x 2 days ago
Pt AO x4, VSS. Pt with no s/s of acute bleeding

## 2019-08-23 NOTE — DISCHARGE NOTE NURSING/CASE MANAGEMENT/SOCIAL WORK - NSDCDPATPORTLINK_GEN_ALL_CORE
You can access the [facility] Patient Portal, offered by [facility], by registering with the following website: [email or web address]

## 2019-08-23 NOTE — PROGRESS NOTE ADULT - PROBLEM SELECTOR PLAN 4
Chronic anemia  Per patient no signs or symptoms suggestive of bleed  Macrocytosis on CBC: Continue Vit B12  Trend Chronic anemia  Per patient no signs or symptoms suggestive of bleed  Macrocytosis on CBC ; continue Vit B12

## 2019-08-26 NOTE — DISCUSSION/SUMMARY
[Rehab] : patient was discharged to rehab [FreeTextEntry1] : Spoke with  pt's daughter f/u s/p hospitalization, pt was discharged to Rehab[facility].\par Will follow up.\par

## 2019-09-05 ENCOUNTER — APPOINTMENT (OUTPATIENT)
Dept: ORTHOPEDIC SURGERY | Facility: CLINIC | Age: 69
End: 2019-09-05
Payer: MEDICARE

## 2019-09-05 VITALS
WEIGHT: 183 LBS | HEIGHT: 67 IN | SYSTOLIC BLOOD PRESSURE: 102 MMHG | DIASTOLIC BLOOD PRESSURE: 67 MMHG | HEART RATE: 67 BPM | BODY MASS INDEX: 28.72 KG/M2

## 2019-09-05 VITALS — WEIGHT: 181 LBS | BODY MASS INDEX: 28.41 KG/M2 | HEIGHT: 67 IN

## 2019-09-05 DIAGNOSIS — S72.491A OTHER FRACTURE OF LOWER END OF RIGHT FEMUR, INITIAL ENCOUNTER FOR CLOSED FRACTURE: ICD-10-CM

## 2019-09-05 DIAGNOSIS — S72.424A: ICD-10-CM

## 2019-09-05 PROCEDURE — 99024 POSTOP FOLLOW-UP VISIT: CPT

## 2019-09-05 NOTE — HISTORY OF PRESENT ILLNESS
[de-identified] : Pt is a 69 y/o female who presents with a right leg injury.  She tripped and landed directly onto her right knee in the hallway at a doctors office after receiving 4 hours of chemotherapy for multiple myeloma and amyloidosis on 8/19/19.  She had swelling and ecchymosis immediately.  Initial read of the x-rays taken the same day were negative for fracture.  She was called to return to the hospital where new studies were taken.  \par A follow-up study involving a CT scan of the femur performed on 08/20/2019 at [facility] revealed an acute nondisplaced fracture along the posterior lateral cortex of the distal femoral metaphysis which extends to the lateral aspect of the intercondylar notch. At the intercondylar notch fracture fragment are minimally depressed. She was placed in a [place] brace.  She was admitted to the hospital because she was unsteady on crutches.  She is currently recovering at [facility]. Her knee pain has improved. She states that she originally did not have pain in the femur after the fall and that the pain in the knee improved 4-5 days later. She has been taking Tylenol and narcotics for neuropathy affecting her right leg and hands. She presents today NWB in a wheelchair with the [place] brace in place. \par Of note, she is s/p right THR by Dr. Disla.

## 2019-09-05 NOTE — END OF VISIT
[FreeTextEntry3] : I, Patel Burroughs MD, ordering physician, have read and attest that all the information, medical decision making and discharge instructions within are true and accurate.

## 2019-09-05 NOTE — ADDENDUM
[FreeTextEntry1] : I, Paola Lynne wrote this note acting as a scribe for Dr. Patel Burroughs on Sep 05, 2019.

## 2019-09-05 NOTE — CONSULT LETTER
[Dear  ___] : Dear  [unfilled], [Consult Letter:] : I had the pleasure of evaluating your patient, [unfilled]. [Please see my note below.] : Please see my note below. [Sincerely,] : Sincerely, [FreeTextEntry2] : SHERI SHIN [FreeTextEntry3] : Patel Burroughs MD

## 2019-09-05 NOTE — PHYSICAL EXAM
[de-identified] : Patient is WDWN, alert, and in no acute distress. Breathing is unlabored. She is grossly oriented to person, place, and time. \par flexion 90, full ext\par Right knee: James brace was removed for examination. There is tenderness over the distal femur and anterior over the knee. No swelling, no valgus or valgus instability present on provocative testing. Flexion and extension 5/5.\par Range of motion: Active flexion to 90 ° and full extension. \par Tests and Signs: All tests for stability are normal. \par Strength: flexion and extension 5/5  [de-identified] : CT scan of the right femur on 08/20/2019 performed at [facility] revealed a nondisplaced fracture along the posterior lateral cortex of the distal femoral metaphysis which extends to the lateral aspect of the intercondylar notch. At the intercondylar notch fracture fragment are minimally depressed. \par \par Ill-defined hematoma overlying the prepatellar soft tissues.

## 2019-09-05 NOTE — DISCUSSION/SUMMARY
[de-identified] : The underlying pathophysiology was reviewed with the patient. Treatment options were discussed including; observation, NSAIDS, analgesics, bracing, and physical therapy. \par \par Patient was returned to the [place] brace. She was informed that she may transition to partial weight bearing in the brace. \par Paperwork for rehab was filled out. \par Follow up in 6 weeks. X-rays upon return.

## 2019-09-23 ENCOUNTER — OUTPATIENT (OUTPATIENT)
Dept: OUTPATIENT SERVICES | Facility: HOSPITAL | Age: 69
LOS: 1 days | Discharge: HOME | End: 2019-09-23

## 2019-09-23 DIAGNOSIS — L98.5 MUCINOSIS OF THE SKIN: ICD-10-CM

## 2019-09-23 DIAGNOSIS — D47.2 MONOCLONAL GAMMOPATHY: ICD-10-CM

## 2019-09-23 DIAGNOSIS — D80.1 NONFAMILIAL HYPOGAMMAGLOBULINEMIA: ICD-10-CM

## 2019-09-25 ENCOUNTER — APPOINTMENT (OUTPATIENT)
Dept: INFUSION THERAPY | Facility: HOSPITAL | Age: 69
End: 2019-09-25

## 2019-09-26 ENCOUNTER — APPOINTMENT (OUTPATIENT)
Dept: INFUSION THERAPY | Facility: HOSPITAL | Age: 69
End: 2019-09-26

## 2019-10-02 NOTE — ED ADULT NURSE NOTE - NS ED NURSE LEVEL OF CONSCIOUSNESS AFFECT
Spoke with a Midwife at [facility]. [facility] had repeat UA there which still shows blood/rbc's. Advised to see Urologist but has no med insurance.We cannot cont testing her due to this and she needs to get checked out for herself. Mom/recip so maybe check in case she has similar problems.   Calm

## 2019-10-22 ENCOUNTER — LABORATORY RESULT (OUTPATIENT)
Age: 69
End: 2019-10-22

## 2019-10-22 ENCOUNTER — APPOINTMENT (OUTPATIENT)
Dept: INFUSION THERAPY | Facility: HOSPITAL | Age: 69
End: 2019-10-22

## 2019-10-23 ENCOUNTER — APPOINTMENT (OUTPATIENT)
Dept: INFUSION THERAPY | Facility: HOSPITAL | Age: 69
End: 2019-10-23

## 2019-10-23 RX ORDER — SOTALOL HCL 120 MG
1 TABLET ORAL
Qty: 0 | Refills: 0 | DISCHARGE

## 2019-11-27 ENCOUNTER — OUTPATIENT (OUTPATIENT)
Dept: OUTPATIENT SERVICES | Facility: HOSPITAL | Age: 69
LOS: 1 days | Discharge: HOME | End: 2019-11-27

## 2019-11-27 DIAGNOSIS — D47.2 MONOCLONAL GAMMOPATHY: ICD-10-CM

## 2019-11-27 DIAGNOSIS — D80.1 NONFAMILIAL HYPOGAMMAGLOBULINEMIA: ICD-10-CM

## 2019-11-27 DIAGNOSIS — L98.5 MUCINOSIS OF THE SKIN: ICD-10-CM

## 2019-12-03 ENCOUNTER — APPOINTMENT (OUTPATIENT)
Dept: INFUSION THERAPY | Facility: HOSPITAL | Age: 69
End: 2019-12-03

## 2019-12-04 ENCOUNTER — APPOINTMENT (OUTPATIENT)
Dept: INFUSION THERAPY | Facility: HOSPITAL | Age: 69
End: 2019-12-04

## 2019-12-16 ENCOUNTER — APPOINTMENT (OUTPATIENT)
Dept: HEMATOLOGY ONCOLOGY | Facility: CLINIC | Age: 69
End: 2019-12-16
Payer: MEDICARE

## 2019-12-16 VITALS
DIASTOLIC BLOOD PRESSURE: 90 MMHG | WEIGHT: 184.99 LBS | SYSTOLIC BLOOD PRESSURE: 135 MMHG | OXYGEN SATURATION: 94 % | TEMPERATURE: 98 F | HEART RATE: 68 BPM | BODY MASS INDEX: 28.98 KG/M2 | RESPIRATION RATE: 17 BRPM

## 2019-12-16 PROCEDURE — 99203 OFFICE O/P NEW LOW 30 MIN: CPT

## 2020-01-03 NOTE — HISTORY OF PRESENT ILLNESS
[FreeTextEntry1] : 68yoF with scleromyxedema presents for initial palliative care visit, self-referred.  \par PMH also significant for multiple myeloma, Afib on Xarelto, chronic back pain, post-herpetic neuralgia, b/l hip replacements, L foot drop. \par \par Patient is on Darzalex. \par \par Main reason for which patient presents today is peripheral neuropathy of the hands and feet.\par She has tried Gabapentin but it causes her hypotension. Lyrica caused swelling.  Tramadol is contraindicated. \par She uses hydromorphone 4mg PRN, which she uses mainly at nighttime. On days when her pain is worse she may use a daytime dose. \par Recently her oncologist Dr. Cano prescribed her a week of Dexamethasone 2mg \par \par She has tried medical cannabis tincture 1:1 without much effect. It was sedating for her. \par \par ROS:\par +diarrhea off and on\par +trouble sleeping 2/2 pain \par \par Denies n/v, constipation, SOB. \par All other ROS non-contributory\par \par Is currently doing PT twice weekly for left foot drop. \par \par Patient is single, lives alone. Has two daughters. \par \par Cardiologist: Dr. Kaykay Dial\par \par I-Stop Ref#: [id number]

## 2020-01-03 NOTE — ASSESSMENT
[FreeTextEntry1] : 69yoF with:\par \par 1. Multiple  Myeloma - On Daratumumab/Pom/Dexa -[place] [facility] [place]\par \par 2. Peripheral neuropathy  - Patient has tried numerous medications without adequate relief. Trial of nucynta 50mg PRN. \par \par RTO 1 month, call sooner with issues

## 2020-01-03 NOTE — PHYSICAL EXAM
[General Appearance - Alert] : alert [General Appearance - In No Acute Distress] : in no acute distress [Sclera] : the sclera and conjunctiva were normal [Normal Oral Mucosa] : normal oral mucosa [Neck Appearance] : the appearance of the neck was normal [] : no respiratory distress [Auscultation Breath Sounds / Voice Sounds] : lungs were clear to auscultation bilaterally [Bowel Sounds] : normal bowel sounds [Abdomen Soft] : soft [Abdomen Tenderness] : non-tender [Skin Color & Pigmentation] : normal skin color and pigmentation [FreeTextEntry1] : L foot drop [Oriented To Time, Place, And Person] : oriented to person, place, and time [Affect] : the affect was normal

## 2020-01-15 ENCOUNTER — APPOINTMENT (OUTPATIENT)
Dept: INFUSION THERAPY | Facility: HOSPITAL | Age: 70
End: 2020-01-15

## 2020-01-16 ENCOUNTER — APPOINTMENT (OUTPATIENT)
Dept: INFUSION THERAPY | Facility: HOSPITAL | Age: 70
End: 2020-01-16

## 2020-01-22 ENCOUNTER — OUTPATIENT (OUTPATIENT)
Dept: OUTPATIENT SERVICES | Facility: HOSPITAL | Age: 70
LOS: 1 days | Discharge: HOME | End: 2020-01-22

## 2020-01-22 DIAGNOSIS — D47.2 MONOCLONAL GAMMOPATHY: ICD-10-CM

## 2020-01-29 ENCOUNTER — APPOINTMENT (OUTPATIENT)
Dept: INFUSION THERAPY | Facility: HOSPITAL | Age: 70
End: 2020-01-29

## 2020-03-16 RX ORDER — AMIODARONE HYDROCHLORIDE 200 MG/1
200 TABLET ORAL
Refills: 0 | Status: ACTIVE | COMMUNITY

## 2020-03-16 RX ORDER — METOPROLOL SUCCINATE 25 MG/1
25 TABLET, EXTENDED RELEASE ORAL
Refills: 0 | Status: ACTIVE | COMMUNITY

## 2020-03-17 NOTE — ED PROVIDER NOTE - PMH
Ok to  Continue her infusions but  Please get me a copy of the ct scan and pft's and have her move up those tests before may 1 so  I can see what is going on.   Atrial fibrillation    Blood Chemistry Abnormality  Scleramyxedema  Hyperlipidemia    Multiple myeloma    Transplants  Stem cell transplant March 2001  Tubal Ligation  1988  Uterus Problem  Prolapse   total abdominal hysterectomy

## 2020-03-25 ENCOUNTER — APPOINTMENT (OUTPATIENT)
Dept: INTERNAL MEDICINE | Facility: CLINIC | Age: 70
End: 2020-03-25

## 2020-04-06 NOTE — PROGRESS NOTE ADULT - PROBLEM SELECTOR PROBLEM 5
[facility] Medicine Services  INPATIENT PROGRESS NOTE    Patient Name: Jesus Smith  Date of Admission: 4/3/2020  Today's Date: 04/06/20  Length of Stay: 3  Primary Care Physician: Matheus Olivera PA    Subjective   Chief Complaint: Follow-up weakness    HPI:  Patient seen and examined.  Still some right upper quadrant pain but overall feels better than yesterday.  Pain is more controlled.  States he is been nauseous today but not thrown up.  He is burping a lot.  Denies chest pain or shortness of breath.  Does say it is hard to get a deep breath due to his abdominal pain however.  No other acute issues or events overnight noted.     ROS:  All pertinent negatives and positives are as above. All other systems have been reviewed and are negative unless otherwise stated.     Objective    Temp:  [97.3 °F (36.3 °C)-98.1 °F (36.7 °C)] 97.4 °F (36.3 °C)  Heart Rate:  [50-63] 52  Resp:  [12-20] 12  BP: (106-133)/(51-89) 127/67  Physical Exam  GEN: Awake, alert, interactive, appears more comfortable today and in NAD  HEENT: PERRLA, EOMI, Anicteric, Trachea midline  Lungs: CTAB, no wheezing/rales/rhonchi  Heart: RRR, +S1/s2, no rub  ABD: Right upper quadrant bandaged with 2 drains in place, +BS, soft, no guarding  Extremities: atraumatic, no cyanosis, trace edema b/l LE  Neuro: AAOx3, no focal deficits  Psych: normal mood & affect        Results Review:  I have reviewed the labs, radiology results, and diagnostic studies.    Laboratory Data:   Results from last 7 days   Lab Units 04/06/20  0441 04/05/20  0405 04/04/20  0520   WBC 10*3/mm3 14.05* 17.08* 12.98*   HEMOGLOBIN g/dL 12.3* 11.6* 11.1*   HEMATOCRIT % 38.2 35.1* 33.7*   PLATELETS 10*3/mm3 183 156 163        Results from last 7 days   Lab Units 04/06/20  0446 04/05/20  0405 04/04/20  0520   SODIUM mmol/L 134* 131* 131*   POTASSIUM mmol/L 4.6 4.2 3.9   CHLORIDE mmol/L 95* 93* 96*   CO2 mmol/L 26.0 24.0 22.0   BUN mg/dL 50*  56* 50*   CREATININE mg/dL 1.33* 1.30* 1.32*   CALCIUM mg/dL 8.5* 8.5* 8.3*   BILIRUBIN mg/dL 1.3* 1.3* 3.0*   ALK PHOS U/L 129* 151* 155*   ALT (SGPT) U/L 99* 113* 73*   AST (SGOT) U/L 57* 115* 94*   GLUCOSE mg/dL 157* 149* 203*       Culture Data:   Blood Culture   Date Value Ref Range Status   04/03/2020 Abnormal Stain (C)  Preliminary       Radiology Data:   Imaging Results (Last 24 Hours)     ** No results found for the last 24 hours. **          I have reviewed the patient's current medications.     Assessment/Plan     Active Hospital Problems    Diagnosis   • **Bacteremia   • History of prosthetic aortic valve   • Abnormal LFTs   • Acute cholecystitis   • Benign essential HTN   • CKD (chronic kidney disease) stage 3, GFR 30-59 ml/min (CMS/HCC)   • Acute UTI (urinary tract infection)       #1  E. coli bacteremia -patient's first blood cultures come back positive for E. coli, pansensitive.  Second culture ended up being negative.  This occurred in the setting of acute cholecystitis.  Has been covered with Zosyn.  Repeat blood cultures from 4/5 are NTD. Will continue zosyn for today and likely change to ceftriaxone tomorrow if he continues to improve.     #2 ?uti -abnormal urinalysis on arrival with leukocyte esterase and nitrates.  However there was 0-2 white cells and trace bacteria.  Apparently due to this was never sent for culture.  Should be covered by Zosyn most likely.  If patient continues to worsen or have fevers may need to reculture.    #3 CKD 3 -possibly by history.  Only previous renal function prior to hospitalization was not normal.  Appears stable. Monitoring closely.     #4  acute cholecystitis - s/p OR on 4/5 for open diaz tube placement. 2 drains in place. On zosyn.     #5 essential hypertension -by history.  Pressure stable and holding losartan.  Monitor blood pressure closely.    #6 hyponatremia -improving with ivf, up to 134    #7  DM 2 -patient known borderline diabetic.  [facility]  A1c found to be 6.6 here.  Covering with sliding scale at this time.  Hypoglycemia protocol in place.    #8 elevated CK -had an elevated CK of 756 on arrival.  No muscle pains. Now normalized. Statin has been on hold. Will resume when taking stable po    #9 prosthetic AVR -was on Coumadin prior.  Denies any history of arrhythmias.  H/h ok, will start full dose lovenox today. Plan for coumadin resumption when taking stable po in a day or two.     Discharge Planning: Ongoing, likely several more days.    Luis Knox DO   04/06/20   08:01     Activated partial thromboplastin time (aPTT) finding

## 2020-04-23 NOTE — ED ADULT NURSE NOTE - NS ED NURSE LEVEL OF CONSCIOUSNESS ORIENTATION
Patient's first and last name, , procedure, and correct site confirmed prior to the start of procedure. Oriented - self; Oriented - place; Oriented - time

## 2020-05-05 ENCOUNTER — APPOINTMENT (OUTPATIENT)
Dept: ORTHOPEDIC SURGERY | Facility: CLINIC | Age: 70
End: 2020-05-05
Payer: MEDICARE

## 2020-05-05 DIAGNOSIS — M54.16 RADICULOPATHY, LUMBAR REGION: ICD-10-CM

## 2020-05-05 DIAGNOSIS — M54.12 RADICULOPATHY, CERVICAL REGION: ICD-10-CM

## 2020-05-05 DIAGNOSIS — S32.009A UNSPECIFIED FRACTURE OF UNSPECIFIED LUMBAR VERTEBRA, INITIAL ENCOUNTER FOR CLOSED FRACTURE: ICD-10-CM

## 2020-05-05 DIAGNOSIS — S22.009A UNSPECIFIED FRACTURE OF UNSPECIFIED THORACIC VERTEBRA, INITIAL ENCOUNTER FOR CLOSED FRACTURE: ICD-10-CM

## 2020-05-05 PROCEDURE — 99203 OFFICE O/P NEW LOW 30 MIN: CPT | Mod: 95

## 2020-05-05 PROCEDURE — 99214 OFFICE O/P EST MOD 30 MIN: CPT | Mod: 95

## 2020-05-05 NOTE — HISTORY OF PRESENT ILLNESS
[Home] : at home, [unfilled] , at the time of the visit. [Medical Office: ([facility])___] : at the medical office located in  [Patient] : the patient [de-identified] : Ms. SALVADOR COURTNEY  is a 69 year old female who presents with back pain.  She has had 2 prior spinal surgeries. She will also get pain in her legs and arms when she lies down.  She has tried medications and injections in the past.  Normal bowel and bladder control.   Denies any recent fevers, chills, sweats, weight loss, or infection.\par \par The patients past medical history, past surgical history, medications, allergies, and social history were reviewed by me today with the patient and documented accordingly.  In addition, the patient's family history, which is noncontributory to their visit, was also reviewed.\par

## 2020-05-05 NOTE — DISCUSSION/SUMMARY
[de-identified] : She is on medication for osteoporosis (Prolia).  She is also on blood thinners.  New MRIs of her cervical and lumbar spine will be obtained.  F/u after.

## 2020-05-05 NOTE — PHYSICAL EXAM
[Antalgic] : antalgic [Normal RUE] : Right Upper Extremity: No scars, rashes, lesions, ulcers, skin intact [Normal LUE] : Left Upper Extremity: No scars, rashes, lesions, ulcers, skin intact [Normal RLE] : Right Lower Extremity: No scars, rashes, lesions, ulcers, skin intact [Normal] : Alert and in no acute distress [Normal LLE] : Left Lower Extremity: No scars, rashes, lesions, ulcers, skin intact [de-identified] : Healed lumbar incision\par Decreased ROM of the cervical and lumbar spine.\par Intact UE strength and sensation.\par LE strength preserved with the exception of a left foot drop\par Decreased left foot sensation [de-identified] : MIld LE peripheral edema [de-identified] : Previous cervical MRI with mild C5/6 stenosis\par \par Previous lumbar MRI with T11, 12 compression fx, old L2 fx, L3-5 spinal fusion with adequate decompression, L5/S1 lateral recess/foraminal stenosis

## 2020-06-18 NOTE — PROGRESS NOTE ADULT - PROBLEM SELECTOR PROBLEM 1
Continue Regimen: Spironolactone 50mg qam, adapalene 0.3% gel every evening paired with over the counter bpo wash Other closed fracture of distal end of right femur, initial encounter Detail Level: Simple

## 2020-06-26 ENCOUNTER — OUTPATIENT (OUTPATIENT)
Dept: OUTPATIENT SERVICES | Facility: HOSPITAL | Age: 70
LOS: 1 days | Discharge: HOME | End: 2020-06-26

## 2020-06-26 DIAGNOSIS — D47.2 MONOCLONAL GAMMOPATHY: ICD-10-CM

## 2020-06-29 NOTE — PHYSICAL EXAM
[General Appearance - In No Acute Distress] : in no acute distress [General Appearance - Alert] : alert [Oriented To Time, Place, And Person] : oriented to person, place, and time [Affect] : the affect was normal

## 2020-07-01 ENCOUNTER — APPOINTMENT (OUTPATIENT)
Dept: HEMATOLOGY ONCOLOGY | Facility: CLINIC | Age: 70
End: 2020-07-01
Payer: MEDICARE

## 2020-07-01 PROCEDURE — 99214 OFFICE O/P EST MOD 30 MIN: CPT | Mod: 95

## 2020-07-01 RX ORDER — TAPENTADOL HYDROCHLORIDE 50 MG/1
50 TABLET, FILM COATED ORAL EVERY 6 HOURS
Qty: 56 | Refills: 0 | Status: DISCONTINUED | COMMUNITY
Start: 2019-12-16 | End: 2020-07-01

## 2020-07-01 NOTE — REASON FOR VISIT
[Follow-Up] : a follow-up visit [Home] : at home, [unfilled] , at the time of the visit. [Medical Office: ([facility])___] : at the medical office located in  [Verbal consent obtained from patient] : the patient, [unfilled]

## 2020-07-01 NOTE — ASSESSMENT
[FreeTextEntry1] : 69yoF with:\par \par 1. Multiple  Myeloma - On Daratumumab/Pom/Dexa -being treated at [facility] [place] by Dr. Cano\par \par 2. Peripheral neuropathy  - Discussed in detail how to use medical cannabis and hemp-derived CBD\par c/w hydromorphone 4mg \par \par 3. OIC - c/w miralax which is helping. \par \par \par Follow up 1 month, call sooner with issues

## 2020-07-01 NOTE — HISTORY OF PRESENT ILLNESS
[FreeTextEntry1] : 69yoF with scleromyxedema presents for follow-up palliative care visit, self-referred.  \par PMH also significant for multiple myeloma, Afib on Xarelto, chronic back pain, post-herpetic neuralgia, b/l hip replacements, L foot drop. \par \par Patient is on Darzalex. \par \par She has tried Gabapentin but it causes her hypotension. Lyrica caused swelling.  Tramadol is contraindicated. \par Patient returns today due to ongoing issues with pain - peripheral neuropathy in hands and feet.  \par She has tried medical cannabis tincture 1:1 without much effect. It was sedating for her. \par The tapentadol that was prescribed when I first met her made her too groggy. \par She was recently started on methadone by her hematologist, was using it at a dose of 2.5mg in the AM, 5mg QHS. This was too sedating and she's not sure it did much for her neuropathy. \par \par She continues to use hydromorphone 4mg PRN, this seems to help the most of anything she's tried, other than dexamethasone.  She tends to use about 2 doses daily. \par \par ROS:\par +constipation - miralax and good hydration\par \par \par She is no longer using sotalol, is now on amiodarone. \par She was started plavix after a coronary stent. \par \par Denies n/v, constipation, SOB. \par All other ROS non-contributory\par \par Recently was diagnosed with b/l carpal tunnel syndrome which she is going to be undergoing surgery for. \par \par Cardiologist: Dr. Kaykay Dial\par \par I-Stop Ref#: [id number]

## 2020-07-24 ENCOUNTER — OUTPATIENT (OUTPATIENT)
Dept: OUTPATIENT SERVICES | Facility: HOSPITAL | Age: 70
LOS: 1 days | Discharge: HOME | End: 2020-07-24

## 2020-07-24 DIAGNOSIS — D47.2 MONOCLONAL GAMMOPATHY: ICD-10-CM

## 2020-07-27 ENCOUNTER — APPOINTMENT (OUTPATIENT)
Dept: INFUSION THERAPY | Facility: HOSPITAL | Age: 70
End: 2020-07-27

## 2020-07-28 ENCOUNTER — APPOINTMENT (OUTPATIENT)
Dept: OTOLARYNGOLOGY | Facility: CLINIC | Age: 70
End: 2020-07-28
Payer: MEDICARE

## 2020-07-28 VITALS
TEMPERATURE: 95.9 F | HEIGHT: 67 IN | SYSTOLIC BLOOD PRESSURE: 142 MMHG | WEIGHT: 179 LBS | BODY MASS INDEX: 28.09 KG/M2 | DIASTOLIC BLOOD PRESSURE: 92 MMHG | HEART RATE: 70 BPM

## 2020-07-28 DIAGNOSIS — J34.2 DEVIATED NASAL SEPTUM: ICD-10-CM

## 2020-07-28 DIAGNOSIS — R04.0 EPISTAXIS: ICD-10-CM

## 2020-07-28 LAB — SARS-COV-2 N GENE NPH QL NAA+PROBE: NOT DETECTED

## 2020-07-28 PROCEDURE — 99204 OFFICE O/P NEW MOD 45 MIN: CPT | Mod: 25

## 2020-07-28 PROCEDURE — 31231 NASAL ENDOSCOPY DX: CPT

## 2020-07-28 NOTE — HISTORY OF PRESENT ILLNESS
[de-identified] : PAtient with a history of amyuloidosis and AFib, had a nasopharyngeal swab for COVID yesterday (not symptomatic, but had to be tested prior to getting IVIg treatment).SHe states that a few hours later after the test she had a nosebleed from the left nostril that eventually stopped. Later in the evening after sneezing she had large clots come out of the nose from the left again. SHe admits that she continues to have a small amount of blood drip out of the nose. SHe is on blood thinners but has not had an issue with nosebleeds in the past.

## 2020-07-28 NOTE — CONSULT LETTER
[Dear  ___] : Dear  [unfilled], [Consult Letter:] : I had the pleasure of evaluating your patient, [unfilled]. [Consult Closing:] : Thank you very much for allowing me to participate in the care of this patient.  If you have any questions, please do not hesitate to contact me. [Please see my note below.] : Please see my note below. [Sincerely,] : Sincerely, [FreeTextEntry3] : Shashi Shane MD\par [facility]\par Otolaryngology and Facial Plastics\par [employer] Professor, [employer]\par

## 2020-07-28 NOTE — ASSESSMENT
[FreeTextEntry1] : Patient on blood thinners had COVID testing resulted in a nosebleed in the left nasal cavity here for evaluation and examination large clots suctioned out of her left nasopharynx she has a severely deviated septum and a left nasal cavity I encouraged her she needs to get COVID tested again to remind the test is that she is on no blood thinners and has a deviated septum and should be a little bit more gentle if at all possible to minimize the risk of traumatizing and and starting a nosebleed.

## 2020-07-30 ENCOUNTER — APPOINTMENT (OUTPATIENT)
Dept: INFUSION THERAPY | Facility: HOSPITAL | Age: 70
End: 2020-07-30

## 2020-08-03 ENCOUNTER — TRANSCRIPTION ENCOUNTER (OUTPATIENT)
Age: 70
End: 2020-08-03

## 2020-08-12 ENCOUNTER — APPOINTMENT (OUTPATIENT)
Dept: HEMATOLOGY ONCOLOGY | Facility: CLINIC | Age: 70
End: 2020-08-12
Payer: MEDICARE

## 2020-08-12 PROCEDURE — 99214 OFFICE O/P EST MOD 30 MIN: CPT | Mod: 95

## 2020-08-12 NOTE — PHYSICAL EXAM
[General Appearance - In No Acute Distress] : in no acute distress [General Appearance - Alert] : alert [Oriented To Time, Place, And Person] : oriented to person, place, and time

## 2020-08-12 NOTE — REASON FOR VISIT
[Follow-Up] : a follow-up visit [Home] : at home, [unfilled] , at the time of the visit. [Medical Office: ([facility])___] : at the medical office located in  [Verbal consent obtained from patient] : the patient, [unfilled]

## 2020-08-13 ENCOUNTER — APPOINTMENT (OUTPATIENT)
Dept: INFUSION THERAPY | Facility: HOSPITAL | Age: 70
End: 2020-08-13

## 2020-08-13 ENCOUNTER — APPOINTMENT (OUTPATIENT)
Dept: HEMATOLOGY ONCOLOGY | Facility: CLINIC | Age: 70
End: 2020-08-13

## 2020-08-14 ENCOUNTER — APPOINTMENT (OUTPATIENT)
Dept: INFUSION THERAPY | Facility: HOSPITAL | Age: 70
End: 2020-08-14

## 2020-08-14 DIAGNOSIS — R11.2 NAUSEA WITH VOMITING, UNSPECIFIED: ICD-10-CM

## 2020-08-14 RX ORDER — DARATUMUMAB 100 MG/5ML
0 INJECTION, SOLUTION, CONCENTRATE INTRAVENOUS
Qty: 0 | Refills: 0 | DISCHARGE

## 2020-08-14 NOTE — ASSESSMENT
[FreeTextEntry1] : 69yoF with:\par \par 1. Multiple  Myeloma - On Daratumumab/Pom/Dexa -being treated at [facility] [place] by Dr. Cano\par \par 2. Peripheral neuropathy - Discussed in detail how to use hemp-derived CBD. C/w hydromorphone 4mg PRN\par \par 3. OIC - C/w miralax which is helping. \par \par 4. Fatigue - AM dose of methylphenidate has been beneficial.  Will add additional dose at lunch time.   \par C/w methylphenidate 2.5mg once at breakfast time and once at lunch time.\par \par Follow up 1 month, call sooner with issues

## 2020-08-14 NOTE — HISTORY OF PRESENT ILLNESS
[FreeTextEntry1] : 69yoF with scleromyxedema presents for follow-up palliative care visit. \par PMH also significant for multiple myeloma, Afib on Xarelto, chronic back pain, post-herpetic neuralgia, b/l hip replacements, L foot drop. \par \par Patient is on Darzalex. \par \par She has tried Gabapentin but it causes her hypotension. Lyrica caused swelling.  Tramadol is contraindicated. \par She has tried medical cannabis tincture 1:1 without much effect. She found that it was too sedating for her. The tapentadol that was prescribed when I first met her made her too groggy. \par She was recently started on methadone by her hematologist, was using it at a dose of 2.5mg in the AM, 5mg QHS. This was too sedating and she's not sure it did much for her neuropathy. \par She continues to use hydromorphone 4mg PRN, this seems to help the most of anything she's tried, other than dexamethasone.  She tends to use about 2 doses daily. \par \par Interval History: \par Patient presents for follow-up.  She tried medical cannabis again but it made her too groggy. She has not obtained hemp-derived CBD yet.   Continues to use PRN hydromorphone 4mg once to twice per day.   She started duloxetine but did not like how it made her feel and self-discontinued.   Fatigue has improved with methylphenidate 2.5mg in AM.  She requires a nap in the afternoon.\par \par ROS:\par +constipation - miralax and good hydration\par She is no longer using sotalol, is now on amiodarone. \par She was started plavix after a coronary stent. \par Denies n/v, SOB. \par All other ROS non-contributory\par \par Recently was diagnosed with b/l carpal tunnel syndrome which she is going to be undergoing surgery for. \par \par Cardiologist: Dr. Kaykay Dial\par \par I-Stop Ref#: [id number]\par \par This visit was completed via TeleMedicine due to the restrictions of the COVID-19 pandemic. All issues documented here were discussed and addressed but no physical exam was performed other than what could be visually ascertained via video.

## 2020-08-17 DIAGNOSIS — D80.1 NONFAMILIAL HYPOGAMMAGLOBULINEMIA: ICD-10-CM

## 2020-08-17 RX ORDER — AMOXICILLIN 500 MG/1
500 TABLET, FILM COATED ORAL
Qty: 20 | Refills: 0 | Status: DISCONTINUED | COMMUNITY
Start: 2020-03-16 | End: 2020-08-17

## 2020-08-17 RX ORDER — SOTALOL HYDROCHLORIDE TABLES AF 80 MG/1
80 TABLET ORAL
Refills: 0 | Status: DISCONTINUED | COMMUNITY
End: 2020-08-17

## 2020-12-10 NOTE — PROGRESS NOTE ADULT - SUBJECTIVE AND OBJECTIVE BOX
PULMONARY PROGRESS NOTE    SALVADOR COURTNEY  MRN-[id number]    Patient is a 67y old  Female who presents with a chief complaint of SOB, rhinorrhea, cough (01 Jan 2018 02:39)      HPI:  -breathing comfortably, still with some cough and chest discomfort.   PTX resolved on CXR.      ROS:   -afebrile  -no N/V/D    MEDICATIONS  (STANDING):  cholecalciferol 2000 Unit(s) Oral daily  cyanocobalamin 1000 MICROGram(s) Oral daily  rivaroxaban 20 milliGRAM(s) Oral every 24 hours  sotalol 80 milliGRAM(s) Oral two times a day    MEDICATIONS  (PRN):  acetaminophen   Tablet. 650 milliGRAM(s) Oral every 6 hours PRN Mild Pain (1 - 3)  morphine  - Injectable 2 milliGRAM(s) IV Push every 6 hours PRN Moderate Pain (4 - 6)  morphine  - Injectable 4 milliGRAM(s) IV Push every 6 hours PRN Severe Pain (7 - 10)        EXAM:  Vital Signs Last 24 Hrs  T(C): 36.8 (02 Jan 2018 10:47), Max: 37.4 (01 Jan 2018 22:55)  T(F): 98.3 (02 Jan 2018 10:47), Max: 99.3 (01 Jan 2018 22:55)  HR: 67 (02 Jan 2018 10:47) (65 - 75)  BP: 108/70 (02 Jan 2018 10:47) (108/70 - 141/81)  BP(mean): --  RR: 18 (02 Jan 2018 10:47) (16 - 18)  SpO2: 97% (02 Jan 2018 10:47) (95% - 97%)    GENERAL: The patient is awake and alert in no apparent distress.     SKIN: Warm, dry, no rashes    LUNGS: Clear to auscultation without wheezing, rales or rhonchi; respirations unlabored    HEART: Regular rate and rhythm without murmur.    ABDOMEN: +BS, Soft, Nontender    EXTREMITIES: No clubbing, cyanosis, edema    LABS/IMGAING: reviewed                          8.7    4.89  )-----------( 240      ( 02 Jan 2018 07:02 )             27.4     01-02    141  |  103  |  12  ----------------------------<  99  3.5   |  26  |  0.55    Ca    9.1      02 Jan 2018 07:59  Phos  3.4     01-02  Mg     1.8     01-02    TPro  7.2  /  Alb  3.1<L>  /  TBili  0.3  /  DBili  x   /  AST  22  /  ALT  28  /  AlkPhos  142<H>  01-01      < from: Xray Chest 1 View AP -PORTABLE-Routine (01.02.18 @ 09:28) >  Impression:    Right apical opacity may reflect focal edema. No pneumothorax. Right   pigtail catheter in place    < end of copied text >      PROBLEM LIST:  67y Female with HEALTH ISSUES - PROBLEM Dx:  Pneumothorax--resolved  Pneumonia  Chronic atrial fibrillation  Myeloma associated amyloidosis      RECS:  -PTX: resolved, no air leak on water seal. Clamp tube, repeat CXR in AM if no PTX would remove pigtail.  If patient experiences cp/sob would check xray if ptx recurs, unclamp tube.  -PNA: continue antibiotics for 7 days  -supplemental O2 --would wean off now.  -Pain control    Nancy Delarosa MD  [phone] Detail Level: Simple Comment: Patient with history of mild depression but completely controlled; sees therapist once per week and psychiatrist once per month. Denies any mood changes. Patient has finished course.  Last pill taken approx 1 week ago. Samples of arazalo given for pt to start 1 month after last accutane pill taken. If patient likes samples, will send in prescription. Will have patient come back in 3 weeks for urine pregnancy test.

## 2021-01-04 RX ORDER — METHADONE HYDROCHLORIDE 5 MG/1
5 TABLET ORAL TWICE DAILY
Qty: 30 | Refills: 0 | Status: DISCONTINUED | COMMUNITY
Start: 2020-08-18 | End: 2021-01-04

## 2021-01-23 ENCOUNTER — OUTPATIENT (OUTPATIENT)
Dept: OUTPATIENT SERVICES | Facility: HOSPITAL | Age: 71
LOS: 1 days | Discharge: HOME | End: 2021-01-23

## 2021-01-23 DIAGNOSIS — D47.2 MONOCLONAL GAMMOPATHY: ICD-10-CM

## 2021-01-28 ENCOUNTER — APPOINTMENT (OUTPATIENT)
Dept: HEMATOLOGY ONCOLOGY | Facility: CLINIC | Age: 71
End: 2021-01-28
Payer: MEDICARE

## 2021-01-28 DIAGNOSIS — R53.83 OTHER FATIGUE: ICD-10-CM

## 2021-01-28 PROCEDURE — 99213 OFFICE O/P EST LOW 20 MIN: CPT | Mod: 95

## 2021-01-28 NOTE — REASON FOR VISIT
[Follow-Up] : a follow-up visit [Home] : at home, [unfilled] , at the time of the visit. [Medical Office: ([facility])___] : at the medical office located in  [Verbal consent obtained from patient] : the patient, [unfilled]

## 2021-02-02 PROBLEM — R53.83 FATIGUE: Status: ACTIVE | Noted: 2020-07-17

## 2021-02-02 NOTE — ASSESSMENT
[FreeTextEntry1] : 69yoF with:\par \par 1. Multiple  Myeloma - On Daratumumab/Pom/Dexa -being treated at [facility] by Dr. Cano. Will be taking a "drug holiday."\par \par 2. Peripheral neuropathy - Recommend she restart the CBD gummies, it is unlikely they were contributing to her epiastaxis.  \par C/w hydromorphone 4mg PRN\par \par 3. OIC - C/w miralax which is helping. \par \par 4. Fatigue - c/w methylphenidate 2.5mg twice daily\par \par Follow up 1 month, call sooner with issues
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
## 2021-05-02 ENCOUNTER — TRANSCRIPTION ENCOUNTER (OUTPATIENT)
Age: 71
End: 2021-05-02

## 2021-05-20 ENCOUNTER — APPOINTMENT (OUTPATIENT)
Dept: NEUROLOGY | Facility: CLINIC | Age: 71
End: 2021-05-20
Payer: MEDICARE

## 2021-05-20 VITALS
WEIGHT: 180 LBS | DIASTOLIC BLOOD PRESSURE: 76 MMHG | HEIGHT: 67 IN | SYSTOLIC BLOOD PRESSURE: 148 MMHG | HEART RATE: 64 BPM | BODY MASS INDEX: 28.25 KG/M2

## 2021-05-20 VITALS — TEMPERATURE: 97.5 F

## 2021-05-20 PROCEDURE — 99204 OFFICE O/P NEW MOD 45 MIN: CPT

## 2021-05-20 NOTE — ASSESSMENT
[FreeTextEntry1] : Ms. 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  She is requesting better pain management.  She has been intolerant to pregabalin and gabapentin.  I suggested another trial of low-dose duloxetine 20 mg daily.  She will report her progress by telephone and return for follow-up in 3 months.  Further management will depend upon her clinical course.

## 2021-05-20 NOTE — PHYSICAL EXAM
[FreeTextEntry1] : Constitutional:  Patient was elderly and frail but in no acute distress. \par \par Head:  Normocephalic, atraumatic. Tympanic membranes were clear. \par \par Neck:  Supple with full range of motion. \par \par Cardiovascular:  Cardiac rhythm was regular without murmur. There were no carotid bruits. Peripheral pulses were full and symmetric. \par \par Respiratory:  Lungs were clear. \par \par Abdomen:  Soft and nontender. \par \par Spine:  Nontender. \par \par Skin:  There were no rashes. \par \par NEUROLOGICAL EXAMINATION:\par \par Mental Status: Patient was alert and oriented. Speech was fluent. There was no dysarthria. \par \par Cranial Nerves: \par \par II: Visual acuity was 20/ 20 bilaterally with near card and glasses. Pupils were surgical but reactive. Visual fields were full. Funduscopic examination was normal. \par \par III, IV, VI:  Eye movements were full without nystagmus. \par \par V: Facial sensation was intact. \par \par VII: Facial strength was normal. \par \par VIII: Hearing was equal. \par \par IX, X: Palatal movement was normal. Phonation was normal. \par \par XI: Sternocleidomastoids and trapezii were normal. \par \par XII: Tongue was midline and movements normal. There was no lingual atrophy or fasciculations. \par \par Motor Examination: There was severe atrophy of the intrinsic hand muscles.  There was atrophy of the left greater than right calves.  There was severe bilateral hand weakness.  There were left greater than right foot drops.  Proximal strength was relatively preserved.\par \par Sensory Examination: There was severe distal sensory loss in the upper and lower extremities.\par \par Reflexes: DTRs were 2 throughout except the ankle jerks which were absent. \par \par Plantar Responses: Plantar responses were neutral. \par \par Coordination/Cerebellar Function: There was no dysmetria on finger to nose or heel to shin testing. \par \par Gait/Stance: She had a wide-based bilateral steppage gait.
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
## 2021-05-20 NOTE — CONSULT LETTER
[Dear  ___] : Dear  [unfilled], [Consult Letter:] : I had the pleasure of evaluating your patient, [unfilled]. [Please see my note below.] : Please see my note below. [Consult Closing:] : Thank you very much for allowing me to participate in the care of this patient.  If you have any questions, please do not hesitate to contact me. [Sincerely,] : Sincerely, [FreeTextEntry3] : Onel Gavin MD\par  [DrShakir  ___] : Dr. EVANS

## 2021-05-20 NOTE — REVIEW OF SYSTEMS
[Arm Weakness] : arm weakness [Hand Weakness] :  hand weakness [Leg Weakness] : leg weakness [Difficulty Writing] : difficulty writing [Numbness] : numbness [Tingling] : tingling [Abnormal Sensation] : an abnormal sensation [Hypersensitivity] : hypersensitivity [Difficulty Walking] : difficulty walking

## 2021-08-06 ENCOUNTER — NON-APPOINTMENT (OUTPATIENT)
Age: 71
End: 2021-08-06

## 2021-08-11 NOTE — ED PROVIDER NOTE - EYES NEGATIVE STATEMENT, MLM
no discharge, no irritation, no pain, no redness, and no visual changes. Humira Counseling:  I discussed with the patient the risks of adalimumab including but not limited to myelosuppression, immunosuppression, autoimmune hepatitis, demyelinating diseases, lymphoma, and serious infections.  The patient understands that monitoring is required including a PPD at baseline and must alert us or the primary physician if symptoms of infection or other concerning signs are noted.

## 2021-08-20 ENCOUNTER — APPOINTMENT (OUTPATIENT)
Dept: NEUROLOGY | Facility: CLINIC | Age: 71
End: 2021-08-20
Payer: MEDICARE

## 2021-08-20 VITALS
HEART RATE: 72 BPM | SYSTOLIC BLOOD PRESSURE: 140 MMHG | WEIGHT: 183 LBS | DIASTOLIC BLOOD PRESSURE: 75 MMHG | BODY MASS INDEX: 28.72 KG/M2 | HEIGHT: 67 IN

## 2021-08-20 PROCEDURE — 99213 OFFICE O/P EST LOW 20 MIN: CPT

## 2021-08-20 RX ORDER — RIVAROXABAN 20 MG/1
20 TABLET, FILM COATED ORAL
Refills: 0 | Status: DISCONTINUED | COMMUNITY
End: 2021-08-20

## 2021-08-20 RX ORDER — DULOXETINE HYDROCHLORIDE 20 MG/1
20 CAPSULE, DELAYED RELEASE PELLETS ORAL DAILY
Qty: 90 | Refills: 1 | Status: DISCONTINUED | COMMUNITY
Start: 2021-05-20 | End: 2021-08-20

## 2021-08-20 RX ORDER — TRAMADOL HYDROCHLORIDE AND ACETAMINOPHEN 37.5; 325 MG/1; MG/1
37.5-325 TABLET, FILM COATED ORAL 3 TIMES DAILY
Qty: 90 | Refills: 0 | Status: ACTIVE | COMMUNITY
Start: 2021-08-20 | End: 1900-01-01

## 2021-08-20 NOTE — ASSESSMENT
[FreeTextEntry1] : Ms. Rader is a 70-year-old with a severe painful sensorimotor polyneuropathy likely due to paraproteinemia, amyloidosis and chemotherapy induced.  She has been intolerant to pregabalin, gabapentin and duloxetine.\par \par I discussed additional neuropathic pain options with Dr. Aguilar.  Options include Ultracet, lamotrigine, naltrexone, venlafaxine and Nucynta.  I will prescribe Ultracet 1 tablet up to 3 times a day.  She will see Dr. Aguilar in 4 weeks.  Telephone contact will be maintained in the meantime.
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
## 2021-08-20 NOTE — PHYSICAL EXAM
[FreeTextEntry1] : Constitutional:  Patient was elderly and frail but in no acute distress. \par \par Head:  Normocephalic, atraumatic. Tympanic membranes were clear. \par \par Neck:  Supple with full range of motion. \par \par Cardiovascular:  Cardiac rhythm was regular without murmur. There were no carotid bruits. Peripheral pulses were full and symmetric. \par \par Respiratory:  Lungs were clear. \par \par Abdomen:  Soft and nontender. \par \par Spine:  Nontender. \par \par Skin:  There were no rashes.  There was thickening of the skin of her fingers and hands.\par \par NEUROLOGICAL EXAMINATION:\par \par Mental Status: Patient was alert and oriented. Speech was fluent. There was no dysarthria. \par \par Cranial Nerves: \par \par II: She could finger count bilaterally. Pupils were surgical but reactive. Visual fields were full. Funduscopic examination was normal. \par \par III, IV, VI:  Eye movements were full without nystagmus. \par \par V: Facial sensation was intact. \par \par VII: Facial strength was normal. \par \par VIII: Hearing was equal. \par \par IX, X: Palatal movement was normal. Phonation was normal. \par \par XI: Sternocleidomastoids and trapezii were normal. \par \par XII: Tongue was midline and movements normal. There was no lingual atrophy or fasciculations. \par \par Motor Examination: There was severe atrophy of the intrinsic hand muscles.  There was atrophy of the left greater than right calves.  There was severe bilateral hand weakness.  There were left greater than right foot drops.  Proximal strength was relatively preserved.\par \par Sensory Examination: There was severe distal sensory loss in the upper and lower extremities.\par \par Reflexes: DTRs were 2 throughout except the brachioradialis and ankle jerks which were absent. \par \par Plantar Responses: Plantar responses were neutral. \par \par Coordination/Cerebellar Function: There was no dysmetria on finger to nose or heel to shin testing. \par \par Gait/Stance: She had a wide-based bilateral steppage gait.

## 2021-09-10 ENCOUNTER — NON-APPOINTMENT (OUTPATIENT)
Age: 71
End: 2021-09-10

## 2021-10-12 ENCOUNTER — TRANSCRIPTION ENCOUNTER (OUTPATIENT)
Age: 71
End: 2021-10-12

## 2021-10-20 NOTE — ED PROVIDER NOTE - DISPOSITION TYPE
You can access the FollowMyHealth Patient Portal offered by [facility] by registering at the following website: [email or web address]. By joining [facility]’s FollowMyHealth portal, you will also be able to view your health information using other applications (apps) compatible with our system. DISCHARGE

## 2021-11-23 ENCOUNTER — APPOINTMENT (OUTPATIENT)
Dept: WOUND CARE | Facility: CLINIC | Age: 71
End: 2021-11-23
Payer: MEDICARE

## 2021-11-23 VITALS
HEART RATE: 60 BPM | HEIGHT: 67 IN | RESPIRATION RATE: 17 BRPM | WEIGHT: 182 LBS | TEMPERATURE: 97.2 F | BODY MASS INDEX: 28.56 KG/M2 | DIASTOLIC BLOOD PRESSURE: 73 MMHG | SYSTOLIC BLOOD PRESSURE: 118 MMHG

## 2021-11-23 DIAGNOSIS — G56.92 UNSPECIFIED MONONEUROPATHY OF LEFT UPPER LIMB: ICD-10-CM

## 2021-11-23 DIAGNOSIS — L98.499 NON-PRESSURE CHRONIC ULCER OF SKIN OF OTHER SITES WITH UNSPECIFIED SEVERITY: ICD-10-CM

## 2021-11-23 DIAGNOSIS — Z81.8 FAMILY HISTORY OF OTHER MENTAL AND BEHAVIORAL DISORDERS: ICD-10-CM

## 2021-11-23 DIAGNOSIS — G56.91 UNSPECIFIED MONONEUROPATHY OF RIGHT UPPER LIMB: ICD-10-CM

## 2021-11-23 DIAGNOSIS — I48.91 UNSPECIFIED ATRIAL FIBRILLATION: ICD-10-CM

## 2021-11-23 PROCEDURE — 99204 OFFICE O/P NEW MOD 45 MIN: CPT

## 2021-11-30 ENCOUNTER — APPOINTMENT (OUTPATIENT)
Dept: WOUND CARE | Facility: CLINIC | Age: 71
End: 2021-11-30
Payer: MEDICARE

## 2021-11-30 VITALS — HEIGHT: 67 IN | WEIGHT: 182 LBS | BODY MASS INDEX: 28.56 KG/M2 | TEMPERATURE: 97.2 F

## 2021-11-30 DIAGNOSIS — T30.0 BURN OF UNSPECIFIED BODY REGION, UNSPECIFIED DEGREE: ICD-10-CM

## 2021-11-30 DIAGNOSIS — S32.020A WEDGE COMPRESSION FRACTURE OF SECOND LUMBAR VERTEBRA, INITIAL ENCOUNTER FOR CLOSED FRACTURE: ICD-10-CM

## 2021-11-30 PROBLEM — L98.499: Status: ACTIVE | Noted: 2021-11-23

## 2021-11-30 PROCEDURE — 11042 DBRDMT SUBQ TIS 1ST 20SQCM/<: CPT

## 2021-11-30 PROCEDURE — 99213 OFFICE O/P EST LOW 20 MIN: CPT | Mod: 25

## 2021-11-30 NOTE — PHYSICAL EXAM
[Normal Breath Sounds] : Normal breath sounds [Normal Rate and Rhythm] : normal rate and rhythm [2+] : left 2+ [Ankle Swelling (On Exam)] : present [Skin Ulcer] : ulcer [Alert] : alert [Oriented to Person] : oriented to person [Oriented to Place] : oriented to place [Oriented to Time] : oriented to time [Calm] : calm [Please See PDF for [employer]] : Please See PDF for [employer]. [JVD] : no jugular venous distention  [Abdomen Tenderness] : ~T ~M No abdominal tenderness [de-identified] : nad [de-identified] : tomas [de-identified] : nl [de-identified] : rom intact hands and legs
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
## 2021-11-30 NOTE — PHYSICAL EXAM
[JVD] : no jugular venous distention  [Normal Breath Sounds] : Normal breath sounds [Normal Rate and Rhythm] : normal rate and rhythm [2+] : left 2+ [Ankle Swelling (On Exam)] : present [Abdomen Tenderness] : ~T ~M No abdominal tenderness [Skin Ulcer] : ulcer [Alert] : alert [Oriented to Person] : oriented to person [Oriented to Place] : oriented to place [Oriented to Time] : oriented to time [Calm] : calm [de-identified] : nad [de-identified] : tomas [de-identified] : nl [de-identified] : rom intact hands and legs [Please See PDF for [employer]] : Please See PDF for [employer].

## 2021-12-06 ENCOUNTER — RX RENEWAL (OUTPATIENT)
Age: 71
End: 2021-12-06

## 2021-12-07 ENCOUNTER — APPOINTMENT (OUTPATIENT)
Dept: WOUND CARE | Facility: CLINIC | Age: 71
End: 2021-12-07
Payer: MEDICARE

## 2021-12-07 VITALS
TEMPERATURE: 98 F | RESPIRATION RATE: 16 BRPM | WEIGHT: 183 LBS | DIASTOLIC BLOOD PRESSURE: 81 MMHG | HEART RATE: 60 BPM | BODY MASS INDEX: 28.66 KG/M2 | SYSTOLIC BLOOD PRESSURE: 138 MMHG

## 2021-12-07 DIAGNOSIS — Z87.828 PERSONAL HISTORY OF OTHER (HEALED) PHYSICAL INJURY AND TRAUMA: ICD-10-CM

## 2021-12-07 PROCEDURE — 99214 OFFICE O/P EST MOD 30 MIN: CPT

## 2021-12-09 DIAGNOSIS — Z00.00 ENCOUNTER FOR GENERAL ADULT MEDICAL EXAMINATION W/OUT ABNORMAL FINDINGS: ICD-10-CM

## 2021-12-09 DIAGNOSIS — R92.2 INCONCLUSIVE MAMMOGRAM: ICD-10-CM

## 2021-12-09 PROBLEM — Z87.828 HEALED WOUND: Status: ACTIVE | Noted: 2021-12-09
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
## 2021-12-09 NOTE — PHYSICAL EXAM
[Normal Breath Sounds] : Normal breath sounds [Normal Rate and Rhythm] : normal rate and rhythm [2+] : left 2+ [Ankle Swelling (On Exam)] : present [Skin Ulcer] : ulcer [Alert] : alert [Oriented to Person] : oriented to person [Oriented to Place] : oriented to place [Oriented to Time] : oriented to time [Calm] : calm [Please See PDF for [employer]] : Please See PDF for [employer]. [JVD] : no jugular venous distention  [Abdomen Tenderness] : ~T ~M No abdominal tenderness [de-identified] : nad [de-identified] : tomas [de-identified] : nl [de-identified] : rom intact hands and legs

## 2021-12-09 NOTE — PLAN
[FreeTextEntry1] : 12/7/21\par Plan - no dressings, healed, moisturize dry skin with aquaphor, WASH SOAP AND WATER\par follow up TEB
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
## 2021-12-16 ENCOUNTER — APPOINTMENT (OUTPATIENT)
Dept: PAIN MANAGEMENT | Facility: CLINIC | Age: 71
End: 2021-12-16

## 2022-01-01 ENCOUNTER — NON-APPOINTMENT (OUTPATIENT)
Age: 72
End: 2022-01-01

## 2022-01-01 ENCOUNTER — APPOINTMENT (OUTPATIENT)
Dept: INTERNAL MEDICINE | Facility: CLINIC | Age: 72
End: 2022-01-01
Payer: MEDICARE

## 2022-01-01 ENCOUNTER — APPOINTMENT (OUTPATIENT)
Dept: GERIATRICS | Facility: CLINIC | Age: 72
End: 2022-01-01

## 2022-01-01 ENCOUNTER — APPOINTMENT (OUTPATIENT)
Dept: HEMATOLOGY ONCOLOGY | Facility: CLINIC | Age: 72
End: 2022-01-01

## 2022-01-01 VITALS
WEIGHT: 179 LBS | DIASTOLIC BLOOD PRESSURE: 60 MMHG | HEIGHT: 67 IN | BODY MASS INDEX: 28.09 KG/M2 | SYSTOLIC BLOOD PRESSURE: 90 MMHG

## 2022-01-01 DIAGNOSIS — N39.46 MIXED INCONTINENCE: ICD-10-CM

## 2022-01-01 DIAGNOSIS — E85.9 AMYLOIDOSIS, UNSPECIFIED: ICD-10-CM

## 2022-01-01 DIAGNOSIS — N39.0 URINARY TRACT INFECTION, SITE NOT SPECIFIED: ICD-10-CM

## 2022-01-01 DIAGNOSIS — E78.00 PURE HYPERCHOLESTEROLEMIA, UNSPECIFIED: ICD-10-CM

## 2022-01-01 DIAGNOSIS — G62.9 POLYNEUROPATHY, UNSPECIFIED: ICD-10-CM

## 2022-01-01 DIAGNOSIS — G57.90 UNSPECIFIED MONONEUROPATHY OF UNSPECIFIED LOWER LIMB: ICD-10-CM

## 2022-01-01 DIAGNOSIS — M48.07 SPINAL STENOSIS, LUMBOSACRAL REGION: ICD-10-CM

## 2022-01-01 DIAGNOSIS — C90.00 MULTIPLE MYELOMA NOT HAVING ACHIEVED REMISSION: ICD-10-CM

## 2022-01-01 DIAGNOSIS — I48.91 UNSPECIFIED ATRIAL FIBRILLATION: ICD-10-CM

## 2022-01-01 DIAGNOSIS — G89.4 CHRONIC PAIN SYNDROME: ICD-10-CM

## 2022-01-01 DIAGNOSIS — L98.5 MUCINOSIS OF THE SKIN: ICD-10-CM

## 2022-01-01 LAB
APPEARANCE: ABNORMAL
BACTERIA UR CULT: ABNORMAL
BACTERIA: ABNORMAL
BILIRUBIN URINE: NEGATIVE
BLOOD URINE: ABNORMAL
COLOR: YELLOW
GLUCOSE QUALITATIVE U: NEGATIVE
HYALINE CASTS: 0 /LPF
KETONES URINE: NEGATIVE
LEUKOCYTE ESTERASE URINE: ABNORMAL
MICROSCOPIC-UA: NORMAL
NITRITE URINE: POSITIVE
PH URINE: 6.5
PROTEIN URINE: ABNORMAL
RED BLOOD CELLS URINE: 7 /HPF
SPECIFIC GRAVITY URINE: 1.02
SQUAMOUS EPITHELIAL CELLS: 3 /HPF
UROBILINOGEN URINE: NORMAL
WHITE BLOOD CELLS URINE: >720 /HPF

## 2022-01-01 PROCEDURE — 99214 OFFICE O/P EST MOD 30 MIN: CPT

## 2022-01-01 PROCEDURE — 99213 OFFICE O/P EST LOW 20 MIN: CPT | Mod: 95

## 2022-01-01 RX ORDER — NITROFURANTOIN (MONOHYDRATE/MACROCRYSTALS) 25; 75 MG/1; MG/1
100 CAPSULE ORAL TWICE DAILY
Qty: 14 | Refills: 0 | Status: ACTIVE | COMMUNITY
Start: 2022-01-01 | End: 1900-01-01

## 2022-01-01 RX ORDER — CEPHALEXIN 500 MG/1
500 CAPSULE ORAL
Qty: 30 | Refills: 0 | Status: ACTIVE | COMMUNITY
Start: 2022-01-01 | End: 1900-01-01

## 2022-01-01 RX ORDER — PREGABALIN 25 MG/1
25 CAPSULE ORAL TWICE DAILY
Qty: 60 | Refills: 0 | Status: DISCONTINUED | COMMUNITY
Start: 2022-01-01 | End: 2022-01-01

## 2022-01-26 ENCOUNTER — APPOINTMENT (OUTPATIENT)
Dept: UROLOGY | Facility: CLINIC | Age: 72
End: 2022-01-26
Payer: MEDICARE

## 2022-01-26 VITALS
TEMPERATURE: 97.6 F | HEART RATE: 64 BPM | RESPIRATION RATE: 16 BRPM | SYSTOLIC BLOOD PRESSURE: 151 MMHG | DIASTOLIC BLOOD PRESSURE: 79 MMHG

## 2022-01-26 DIAGNOSIS — R35.0 FREQUENCY OF MICTURITION: ICD-10-CM

## 2022-01-26 DIAGNOSIS — N39.46 MIXED INCONTINENCE: ICD-10-CM

## 2022-01-26 DIAGNOSIS — R39.15 URGENCY OF URINATION: ICD-10-CM

## 2022-01-26 DIAGNOSIS — R33.9 RETENTION OF URINE, UNSPECIFIED: ICD-10-CM

## 2022-01-26 PROCEDURE — 51798 US URINE CAPACITY MEASURE: CPT

## 2022-01-26 PROCEDURE — 99213 OFFICE O/P EST LOW 20 MIN: CPT

## 2022-01-26 RX ORDER — ACYCLOVIR 400 MG/1
400 TABLET ORAL
Refills: 0 | Status: ACTIVE | COMMUNITY

## 2022-01-26 RX ORDER — CEPHALEXIN 500 MG/1
500 CAPSULE ORAL 3 TIMES DAILY
Qty: 21 | Refills: 0 | Status: COMPLETED | COMMUNITY
Start: 2021-11-23 | End: 2022-01-26

## 2022-01-26 RX ORDER — VALACYCLOVIR HYDROCHLORIDE 500 MG/1
500 TABLET, FILM COATED ORAL
Qty: 30 | Refills: 6 | Status: COMPLETED | COMMUNITY
End: 2022-01-26

## 2022-01-26 RX ORDER — ASPIRIN 325 MG/1
325 TABLET, COATED ORAL
Refills: 0 | Status: ACTIVE | COMMUNITY

## 2022-01-26 RX ORDER — METHYLPHENIDATE HYDROCHLORIDE 5 MG/1
5 TABLET ORAL
Qty: 30 | Refills: 0 | Status: COMPLETED | COMMUNITY
Start: 2020-07-17 | End: 2022-01-26

## 2022-01-26 RX ORDER — ALIROCUMAB 75 MG/ML
75 INJECTION, SOLUTION SUBCUTANEOUS
Refills: 0 | Status: ACTIVE | COMMUNITY

## 2022-01-26 RX ORDER — AMLODIPINE BESYLATE 2.5 MG/1
2.5 TABLET ORAL
Refills: 0 | Status: COMPLETED | COMMUNITY
End: 2022-01-26

## 2022-01-26 RX ORDER — CLOPIDOGREL 75 MG/1
75 TABLET, FILM COATED ORAL
Refills: 0 | Status: COMPLETED | COMMUNITY
End: 2022-01-26

## 2022-01-26 RX ORDER — POMALIDOMIDE 2 MG/1
2 CAPSULE ORAL
Qty: 21 | Refills: 0 | Status: COMPLETED | COMMUNITY
Start: 2019-03-27 | End: 2022-01-26

## 2022-01-26 RX ORDER — DENOSUMAB 60 MG/ML
60 INJECTION SUBCUTANEOUS
Refills: 0 | Status: ACTIVE | COMMUNITY

## 2022-01-26 RX ORDER — SILVER SULFADIAZINE 10 MG/G
1 CREAM TOPICAL DAILY
Qty: 1 | Refills: 2 | Status: COMPLETED | COMMUNITY
Start: 2021-11-23 | End: 2022-01-26

## 2022-01-26 RX ORDER — LEVOTHYROXINE SODIUM 0.09 MG/1
88 TABLET ORAL
Refills: 0 | Status: ACTIVE | COMMUNITY

## 2022-01-26 RX ORDER — POVIDONE IODINE 10% LIQUID 10 MG/ML
10 LIQUID TOPICAL
Qty: 1 | Refills: 1 | Status: COMPLETED | COMMUNITY
Start: 2021-11-23 | End: 2022-01-26

## 2022-01-26 NOTE — HISTORY OF PRESENT ILLNESS
[FreeTextEntry1] : 67 yo female with cc of recurrent UTIs. Pt with PMHx significant for anemia, monoclonal gammopathy, scleromyxedema s/p stem cell txp in 2001, and plasma cell myeloma with amyloidosis dx in 2014. She has been followed for recurrent UTI. She was seen by Dr Patino. She had renal US in July 2016 that was negative. She has had cysto with her more than once. SHe has had issues with infections in the past particularly during her IV Ig tx. She was on ppx macrobid and this was changed to methenamine. Cultures are consistently positive with either EColi or Klebsiella. Taking vagifem once per week. SHe was started on methenamine but not always taking. SHe did well from Aug until April. Had many other issues in the time (flu, GI bug, PTX). Last seen in July after having gone to ER. UCx with EColi. Had CT scan done that showed mild R hydro. Unable to completely assess due to streak from hip hardware. Several hours later an US was done that showed no hydro and no findings in distal ureter or bladder. She was treated with ceftin. Treated by me with cefdinir and restarted on methenamine. She did not tolerate this well and started herself on azo with cranberry and d-mannose. \par \par SHe did well until Nov. She went for prolia shot and was not feeling well and had UCx that showed kleb pneumo and was tx with keflex. She came in for follow-up on 12/18 with Kleb pneumo. Treated again with keflex. Returns today for follow-up. She finished this abx last week. She had sx immediately again. I gave her cefpodoxime. Her pain sx have resolved but urgency is persisting. \par  \par Today pt reports improvement in her recurrent UTI - 2 in past 3 years. \par Voids with urgency 20 mins frequency for early part of  morning , and then q 3-4 h  , with ANDRE urge predominant , not using pads or liners, nocturia- 0-1 . Chronic constipation- Miralax .Fluids : 4-5 glasses of water, decaff coffee 1 cup .She is no  longer using Estrace , no dmannos for her Recurrent UTI.\par \par  ml

## 2022-01-26 NOTE — REVIEW OF SYSTEMS
[see HPI] : see HPI [Constipation] : constipation [Incontinence] : incontinence [Negative] : Heme/Lymph [Dysuria] : no dysuria

## 2022-01-26 NOTE — ASSESSMENT
[FreeTextEntry1] : 67 yo female with cc of recurrent UTIs. Pt with PMHx significant for anemia, monoclonal gammopathy, scleromyxedema s/p stem cell txp in 2001, and plasma cell myeloma with amyloidosis dx in 2014. She has been followed for recurrent UTI\par \par Doing well with UTI - 2 in past 3 years , no longer on UTI ppx. \par \par Counseled the patient on constipation and how it can affect the urinary tract. We discussed increasing water intake, daily fruits and vegetables, and sources of fiber. We recommended 4 servings of whole fruit per day, excluding dried fruit or juices. We also recommended supplementing soluble fiber intake with gummy fiber #2/day.\par \par UA , Microscopic analysis , cx \par \par RTO Q 6 months and prn \par

## 2022-01-26 NOTE — PHYSICAL EXAM
[General Appearance - Well Developed] : well developed [General Appearance - Well Nourished] : well nourished [Normal Appearance] : normal appearance [Well Groomed] : well groomed [General Appearance - In No Acute Distress] : no acute distress [Abdomen Soft] : soft [Abdomen Tenderness] : non-tender [Costovertebral Angle Tenderness] : no ~M costovertebral angle tenderness [Urinary Bladder Findings] : the bladder was normal on palpation [Edema] : no peripheral edema [] : no respiratory distress [Respiration, Rhythm And Depth] : normal respiratory rhythm and effort [Exaggerated Use Of Accessory Muscles For Inspiration] : no accessory muscle use [Oriented To Time, Place, And Person] : oriented to person, place, and time [Affect] : the affect was normal [Mood] : the mood was normal [Not Anxious] : not anxious

## 2022-01-28 LAB
APPEARANCE: CLEAR
BACTERIA: ABNORMAL
BILIRUBIN URINE: NEGATIVE
BLOOD URINE: ABNORMAL
COLOR: YELLOW
GLUCOSE QUALITATIVE U: NEGATIVE
HYALINE CASTS: 1 /LPF
KETONES URINE: NEGATIVE
LEUKOCYTE ESTERASE URINE: ABNORMAL
MICROSCOPIC-UA: NORMAL
NITRITE URINE: POSITIVE
PH URINE: 6
PROTEIN URINE: NORMAL
RED BLOOD CELLS URINE: 1 /HPF
SPECIFIC GRAVITY URINE: 1.02
SQUAMOUS EPITHELIAL CELLS: 3 /HPF
UROBILINOGEN URINE: NORMAL
WHITE BLOOD CELLS URINE: 11 /HPF

## 2022-01-31 RX ORDER — FOSFOMYCIN TROMETHAMINE 3 G/1
3 POWDER ORAL
Qty: 2 | Refills: 0 | Status: ACTIVE | COMMUNITY
Start: 2022-01-31

## 2022-02-03 ENCOUNTER — APPOINTMENT (OUTPATIENT)
Dept: OTOLARYNGOLOGY | Facility: CLINIC | Age: 72
End: 2022-02-03
Payer: MEDICARE

## 2022-02-03 VITALS
HEIGHT: 67 IN | DIASTOLIC BLOOD PRESSURE: 75 MMHG | BODY MASS INDEX: 28.56 KG/M2 | SYSTOLIC BLOOD PRESSURE: 130 MMHG | TEMPERATURE: 96 F | WEIGHT: 182 LBS | HEART RATE: 66 BPM

## 2022-02-03 DIAGNOSIS — H92.02 OTALGIA, LEFT EAR: ICD-10-CM

## 2022-02-03 DIAGNOSIS — H61.22 IMPACTED CERUMEN, LEFT EAR: ICD-10-CM

## 2022-02-03 DIAGNOSIS — H93.12 TINNITUS, LEFT EAR: ICD-10-CM

## 2022-02-03 PROCEDURE — 69210 REMOVE IMPACTED EAR WAX UNI: CPT

## 2022-02-03 PROCEDURE — 31575 DIAGNOSTIC LARYNGOSCOPY: CPT

## 2022-02-03 PROCEDURE — 99214 OFFICE O/P EST MOD 30 MIN: CPT | Mod: 25

## 2022-02-03 NOTE — HISTORY OF PRESENT ILLNESS
[de-identified] : Patient has been having flare ups of her amyloidosis and neuropathy. She has had a raspy voice constantly for about a month. She denies pain in the throat and is able to eat and drink  without issues. She is able to wake up in the morning with a normal voice but by midday her voice is raspy. She denies a history of acid reflux. \par SHe has had a mild left ear pain that is constant and radiates to the left neck. Her balance has been off as well but she doesn’t know if this is related to her ear or other medical issues. \par She has had a runny nose bilaterally since September 2021. \par She states she was on a medrol pack for another issue from her hematologist and her raspy voice did nto change \par She is on an antibiotic right now for a UTI.

## 2022-02-03 NOTE — REVIEW OF SYSTEMS
[Ear Pain] : ear pain [Dizziness] : dizziness [Ear Noises] : ear noises [Nasal Congestion] : nasal congestion [Hoarseness] : hoarseness [Negative] : Heme/Lymph

## 2022-02-03 NOTE — ASSESSMENT
[FreeTextEntry1] : Patient with a history of amyloid neuropathies 2-month history of raspiness of her voice treated with a Medrol Dosepak no improvement here for evaluation no difficulty swallowing just a persistent raspy voice fiberoptic evaluation of her larynx reveals a left vocal cord paralysis fixed and mid midline pork glottic closure on phonation etiology of her raspiness will continue to monitor and wait I will reevaluate her in 2 months if there is no significant improvement consideration for further radiographic work-up to rule out any other etiologies for left vocal cord paralysis will be considered at that time this was all explained to her and understood.

## 2022-02-03 NOTE — END OF VISIT
[FreeTextEntry3] : I saw and examined this patient in person. I have discussed with Chacha Pratt, Physician Assistant, in detail the above note and agree with the above assessment and plan of care.\par

## 2022-03-11 NOTE — H&P ADULT - NSHPPOAPULMEMBOLUS_GEN_A_CORE
ADULT INPATIENT PROGRESS NOTE     Elvia Bertrand is a 59 year old female at [facility] ( LOS: 8 days )    Subjective:   Patient was seen in dialysis continues to have pain right leg    Objective:   VITALS:          Visit Vitals  /50 (BP Location: RUE - Right upper extremity, Patient Position: Semi-Grant's)   Pulse 67   Temp 98.2 °F (36.8 °C) (Oral)   Resp 15   Ht 5' 1\" (1.549 m)   Wt 97.6 kg (215 lb 2.7 oz)   SpO2 99%   BMI 40.66 kg/m²       POCT Glucose readings (if applicable):      No results found for: GLUCOSEPOCT     Physical Exam:   On examination patient is alert oriented x3 obese  Heart S1-S2 regular no gallop no murmur  Lungs bilateral fair air entry no rales or rhonchi  Abdomen is soft bowel sounds plus no guarding tenderness or rigidity  Extremities has a wound right anterior shin with some fluctuance and cellulitis right leg and a ulceration of her right hallux and decreased pulses   Neuro no focal deficits    Data Reviewed:    Lab Results   Component Value Date    SODIUM 140 03/10/2022    SODIUM 139 03/09/2022    SODIUM 134 (L) 03/07/2022    SODIUM 138 10/03/2020    SODIUM 140 07/06/2020    SODIUM 139 01/16/2020    POTASSIUM 4.1 03/10/2022    POTASSIUM 3.8 03/09/2022    POTASSIUM 5.3 (H) 03/07/2022    POTASSIUM 4.2 10/03/2020    POTASSIUM 5.1 07/06/2020    POTASSIUM Slight hemolysis, result may be falsely increased. 07/06/2020    CHLORIDE 103 03/10/2022    CHLORIDE 100 10/03/2020    ANIONGAP 11 03/10/2022    ANIONGAP 12 10/03/2020    BUN 38 (H) 03/10/2022    BUN 37 (H) 10/03/2020    ALBUMIN 2.4 (L) 03/10/2022    ALBUMIN 3.5 (L) 10/03/2020    AST 22 03/10/2022    AST 19 10/03/2020     Lab Results   Component Value Date    WBC 10.2 03/10/2022    WBC 11.3 (H) 03/09/2022    WBC 13.8 (H) 03/08/2022    WBC 8.2 07/06/2020    WBC 8.7 01/16/2020    WBC 8.9 08/15/2019    .3 (H) 03/10/2022    .5 (H) 07/06/2020     No results for input(s): INR, PT in the last 72 hours.  No results found  for: CPK, CKMB, TROPONINI  No results found for: TROPONINI  Lab Results   Component Value Date     (H) 04/06/2018     (H) 05/29/2017     Lab Results   Component Value Date    CHOLESTEROL 184 10/03/2020    CHOLESTEROL Desirable            <200 10/03/2020    CHOLESTEROL Borderline High      200 to 239 10/03/2020    CHOLESTEROL High                 >=240 10/03/2020         Assessment:     Hospital Problem List:      Principal Problem:    Foot infection  Active Problems:    Cellulitis and abscess of right leg           Impression:  Cellulitis/abscess right anterior leg wound  Right great toe ulcer with eschar  Peripheral arterial disease  Critical limb ischemia with gangrene present on admission  Diabetic peripheral neuropathy  Charcot arthropathy  Diabetes mellitus type 2 with hyperglycemia  Labile hypertension  End-stage renal disease on hemodialysis  Morbid obesity  Status post right lower extremity angiogram with balloon angioplasty of right SFA, right posterior tibial artery and right popliteal artery  Plan:   Continue with Eliquis, wound care and hemodialysis, bypass surgery next week      Current LOS:  8 days      See Orders for additional details.            Lalit Ann MD    3/11/2022, 12:45 PM     no

## 2022-04-06 ENCOUNTER — APPOINTMENT (OUTPATIENT)
Dept: SPINE | Facility: CLINIC | Age: 72
End: 2022-04-06
Payer: MEDICARE

## 2022-04-06 VITALS
SYSTOLIC BLOOD PRESSURE: 170 MMHG | HEART RATE: 60 BPM | WEIGHT: 182 LBS | BODY MASS INDEX: 28.56 KG/M2 | OXYGEN SATURATION: 96 % | HEIGHT: 67 IN | DIASTOLIC BLOOD PRESSURE: 79 MMHG

## 2022-04-06 DIAGNOSIS — M48.02 SPINAL STENOSIS, CERVICAL REGION: ICD-10-CM

## 2022-04-06 PROCEDURE — 99202 OFFICE O/P NEW SF 15 MIN: CPT

## 2022-04-14 ENCOUNTER — APPOINTMENT (OUTPATIENT)
Dept: OTOLARYNGOLOGY | Facility: CLINIC | Age: 72
End: 2022-04-14
Payer: MEDICARE

## 2022-04-14 VITALS — HEART RATE: 70 BPM | DIASTOLIC BLOOD PRESSURE: 54 MMHG | TEMPERATURE: 97.6 F | SYSTOLIC BLOOD PRESSURE: 92 MMHG

## 2022-04-14 DIAGNOSIS — R49.0 DYSPHONIA: ICD-10-CM

## 2022-04-14 DIAGNOSIS — J38.00 PARALYSIS OF VOCAL CORDS AND LARYNX, UNSPECIFIED: ICD-10-CM

## 2022-04-14 DIAGNOSIS — R13.11 DYSPHAGIA, ORAL PHASE: ICD-10-CM

## 2022-04-14 PROCEDURE — 31575 DIAGNOSTIC LARYNGOSCOPY: CPT

## 2022-04-14 PROCEDURE — 99214 OFFICE O/P EST MOD 30 MIN: CPT | Mod: 25

## 2022-04-14 NOTE — ASSESSMENT
[FreeTextEntry1] : Patient follows up for reevaluation of her larynx does show left vocal cord paralysis that seems to be fixed now in the midline she does not give any history of any significant aspirations although she does complain that occasionally liquids give her a problem we had a discussion of modified barium swallow and speech therapy she is not interested at this point but she promised to let me know if she feels it is getting out of control we also discussed an evaluation by Dr. Waters for possible vocal cord injections if her voice bothers her.  She claims she has had an MRI of the head and neck at [facility] she will give us copies as well as she thinks she had a CAT scan of her chest in the emergency room several years ago and she will get a copy of that if not we may have to send her for another x-ray to complete the work-up of a left vocal cord paralysis.

## 2022-04-14 NOTE — REVIEW OF SYSTEMS
[Ear Pain] : ear pain [Hoarseness] : hoarseness [Throat Clearing] : throat clearing [Negative] : Heme/Lymph [Ear Noises] : ear noises [de-identified] : swallowing issues

## 2022-04-14 NOTE — HISTORY OF PRESENT ILLNESS
[de-identified] : Patient with a history of amyloidosis and multiple myeloma, returns to evaluate her vocal cord paralysis, diagnosed two months ago in the office. She does not note any improvement since the last visit in regards to her voice. She remains raspy all the time but has not completely lost her voice since the last visit. She does not have any throat pain, but she does admit that frequently while eating or drinking ti feels like food or liquid goes down the wrong the wrong pipe and she will cough uncontrollably. \par She still has ear pain bilaterally but the left ear is worse and does click. The pain in both ears occurs when she chews\par She does state she has recently had an MRI of the head neck and chest (through [facility]) for her other chronic medical issues

## 2022-05-25 ENCOUNTER — OUTPATIENT (OUTPATIENT)
Dept: OUTPATIENT SERVICES | Facility: HOSPITAL | Age: 72
LOS: 1 days | Discharge: HOME | End: 2022-05-25

## 2022-05-25 DIAGNOSIS — D47.2 MONOCLONAL GAMMOPATHY: ICD-10-CM

## 2022-06-01 ENCOUNTER — APPOINTMENT (OUTPATIENT)
Dept: HEMATOLOGY ONCOLOGY | Facility: CLINIC | Age: 72
End: 2022-06-01
Payer: MEDICARE

## 2022-06-01 DIAGNOSIS — K59.00 CONSTIPATION, UNSPECIFIED: ICD-10-CM

## 2022-06-01 DIAGNOSIS — Z51.5 ENCOUNTER FOR PALLIATIVE CARE: ICD-10-CM

## 2022-06-01 PROCEDURE — 99215 OFFICE O/P EST HI 40 MIN: CPT | Mod: 95

## 2022-06-01 NOTE — REASON FOR VISIT
[Follow-Up] : a follow-up visit [Home] : at home, [unfilled] , at the time of the visit. [Medical Office: ([facility])___] : at the medical office located in  [Verbal consent obtained from patient] : the patient, [unfilled]
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
## 2022-06-02 NOTE — ASSESSMENT
[FreeTextEntry1] : 71yoF with:\par \par 1. Multiple  Myeloma - On Daratumumab -being treated at [facility] by Dr. Cano. \par \par 2. Peripheral neuropathy - Patient has tried numerous medications without adequate relief.\par  - Medical cannabis recertification completed today. Provided cannabis education, overview of state program, and discussed adverse effects in detail. Counseled that vaporized cannabis is not the preferred route of administration due to the fact that both short-term and long-term risks associated with vaporizing oils are not yet fully understood. Recommend starting with 1:1 THC:CBD formulation at low dose of THC (~2mg/dose).\par - If no improvement, can consider re-trial of Methadone.  However, would require ECG for review prior to initiation. Patient will request a copy from her cardiologist to be faxed to our office.\par - C/w hydromorphone 4mg PRN.  Can consider low dose PRN Hydromorphone 1-2mg during the day.\par \par 3. OIC - C/w miralax which is helping. \par \par 4. Encounter for palliative care - Emotional support provided. \par \par Follow up 2-3 weeks, call sooner with issues

## 2022-06-20 ENCOUNTER — APPOINTMENT (OUTPATIENT)
Dept: HEMATOLOGY ONCOLOGY | Facility: CLINIC | Age: 72
End: 2022-06-20

## 2022-08-29 ENCOUNTER — OUTPATIENT (OUTPATIENT)
Dept: OUTPATIENT SERVICES | Facility: HOSPITAL | Age: 72
LOS: 1 days | Discharge: HOME | End: 2022-08-29

## 2022-08-29 DIAGNOSIS — D47.2 MONOCLONAL GAMMOPATHY: ICD-10-CM

## 2022-08-31 NOTE — REASON FOR VISIT
[Follow-Up] : a follow-up visit [Home] : at home, [unfilled] , at the time of the visit. [Medical Office: ([facility])___] : at the medical office located in  [Verbal consent obtained from patient] : the patient, [unfilled]

## 2022-09-01 ENCOUNTER — APPOINTMENT (OUTPATIENT)
Dept: HEMATOLOGY ONCOLOGY | Facility: CLINIC | Age: 72
End: 2022-09-01

## 2022-09-01 PROCEDURE — 99213 OFFICE O/P EST LOW 20 MIN: CPT | Mod: 95

## 2022-09-02 NOTE — HISTORY OF PRESENT ILLNESS
[FreeTextEntry1] : 71yoF with scleromyxedema presents for follow-up palliative care visit.\par PMH also significant for multiple myeloma, Afib on Xarelto and Amiodarone, chronic back pain, post-herpetic neuralgia, b/l hip replacements, L foot drop, CAD s/p LAD stent (on Plavix since 2/2020). \par \par Patient is on Darzalex. \par \par She has tried Gabapentin but it causes her hypotension. Lyrica caused swelling.  Tramadol is contraindicated. \par She has tried medical cannabis tincture 1:1 without much effect. \par She tried 15mg CBD capsules from [employer], noticed that they made her feel a little groggy.  She subsequently purchased 10mg CBD gummies from [employer] (containing etelvina and turmeric). She noticed that she was less achy on the gummies. \par She found that it was too sedating for her. The tapentadol that was prescribed when I first met her made her too groggy. \par She was recently started on methadone by her hematologist, was using it at a dose of 2.5mg in the AM, 5mg QHS. This was too sedating and she's not sure it did much for her neuropathy. \par She continues to use hydromorphone 4mg PRN, this seems to help the most of anything she's tried, other than dexamethasone.  She tends to use about 2 doses daily. \par \par Interval History (9/1/22): \par Patient presents for follow-up via TeleMedicine. \par She remains on Darzalex.\par She experiences continued pain in her hands and feet. At times she has sharp, electric shock like pain in her hands and feet..  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  She has seen Dr. Alexandre De La O ([facility]) next week for evaluation.  Most recent EMG 2/2022. \par She has lack of sensation in her hands that causes her to drop things, burn her hands. It is as if her hands are not functioning.  She is unable to sew, knit and do things she enjoys. \par Reports severe pain of b/l hands at times. She uses hydromorphone 4mg around 9pm nightly, she finds it allows her to fall asleep and get through the night better. She has on occasion used PRN Hydromorphone 2mg during the day if the pain is particularly bad. Has questions about how to use the medicinal cannabis to try to get a beneficial effect on her neuropathy. \par She had previously re-trialed Pregabalin but she experienced LE swelling and felt foggy.\par She is no longer driving.  Has participated in PT but did not find much benefit. \par \par ROS:\par + significantly decreased right hand mobility\par + going to start Multaq, has been off of Amiodarone x 7 weeks\par +constipation - miralax daily and good hydration\par Denies n/v, SOB. \par All other ROS non-contributory\par \par Cardiologist: Dr. Kaykay Dial\par Oncologist: Dr. Ciro Cano ([facility])\par Neurologist: Dr. Israel Cheek\par \par I-Stop Ref#: [id number]

## 2022-09-02 NOTE — ASSESSMENT
[FreeTextEntry1] : 71yoF with:\par \par 1. Multiple  Myeloma - On Daratumumab -being treated at [facility] by Dr. Cano. \par \par 2. Peripheral neuropathy - Patient has tried numerous medications without adequate relief. She is interested in continuing to pursue medicinal cannabis if it might hold some promise in helping her neuropathy.  Recommend she use the high CBD formulation during daytime and the 1:1 at nighttime. Will work on uptitrating. \par - C/w hydromorphone 4mg PRN.  Can consider low dose PRN Hydromorphone 1-2mg during the day.\par \par 3. Encounter for palliative care - Emotional support provided. \par \par Follow up in 1 month, call sooner with issues

## 2022-10-06 NOTE — REASON FOR VISIT
[Follow-Up] : a follow-up visit [Home] : at home, [unfilled] , at the time of the visit. [Medical Office: ([facility])___] : at the medical office located in  [Verbal consent obtained from patient] : the patient, [unfilled]

## 2022-10-07 NOTE — HISTORY OF PRESENT ILLNESS
[FreeTextEntry1] : 71yoF with scleromyxedema presents for follow-up palliative care visit.\par PMH also significant for multiple myeloma, Afib on Xarelto and Amiodarone, chronic back pain, post-herpetic neuralgia, b/l hip replacements, L foot drop, CAD s/p LAD stent (on Plavix since 2/2020). \par \par Patient is on Darzalex. \par \par She has tried Gabapentin but it causes her hypotension. Lyrica caused swelling.  Tramadol is contraindicated. \par She has tried medical cannabis tincture 1:1 without much effect. \par She tried 15mg CBD capsules from [employer], noticed that they made her feel a little groggy.  She subsequently purchased 10mg CBD gummies from [employer] (containing etelvina and turmeric). She noticed that she was less achy on the gummies. \par She found that it was too sedating for her. The tapentadol that was prescribed when I first met her made her too groggy. \par She was recently started on methadone by her hematologist, was using it at a dose of 2.5mg in the AM, 5mg QHS. This was too sedating and she's not sure it did much for her neuropathy. \par She continues to use hydromorphone 4mg PRN, this seems to help the most of anything she's tried, other than dexamethasone.  She tends to use about 2 doses daily. \par \par Interval History (10/7/22): \par Patient presents for follow-up via TeleMedicine. \par She remains on Darzalex.\par She experiences continued pain in her hands and feet. At times she has sharp, electric shock like pain in her hands and feet..  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  She has seen Dr. Alexandre De La O ([facility]) next week for evaluation.  Most recent EMG was on 2/2022. \par Reports continued severe pain of b/l hands at times. She continues to use hydromorphone 4mg around 9pm nightly, she finds it allows her to fall asleep and get through the night better. She has on occasion used PRN Hydromorphone 2mg during the day if the pain is particularly bad. \par She tried medicinal cannabis once again. She had the 1:6 THC:CBD softgels and 1:1 THC:CBD dissolvable tablet. The softgel she took a few times but she felt too groggy. She did not want to continue with it.\par \par She had previously re-trialed Pregabalin but she experienced LE swelling and felt foggy. The swelling was an issue as she cannot use her hands well as it is and the swelling made it worse. Is willing to re-try Gabapentin. \par She is no longer driving.  Has participated in PT but did not find much benefit. \par \par ROS:\par + significantly decreased right hand mobility\par + now on Multaq, has been off of Amiodarone. Would like to switch back due to high copay for Multaq. \par +constipation - miralax daily and good hydration\par Denies n/v, SOB. \par All other ROS non-contributory\par \par Cardiologist: Dr. Kaykay Dial\par Oncologist: Dr. Ciro Cano ([facility])\par Neurologist: Dr. Israel Cheek\par \par I-Stop Ref#: [id number]

## 2022-10-07 NOTE — ASSESSMENT
[FreeTextEntry1] : 71yoF with:\par \par 1. Multiple  Myeloma - On Daratumumab -being treated at [facility] by Dr. Cano. \par \par 2. Peripheral neuropathy - Patient has tried numerous medications without adequate relief. \par - C/w hydromorphone 4mg PRN.  Can consider low dose PRN Hydromorphone 1-2mg during the day. \par - Start gabapentin 100mg BID with plans to uptitrate if tolerated. \par \par 3. Encounter for palliative care - Emotional support provided. \par \par Follow up in 1 month, call sooner with issues

## 2022-12-10 PROBLEM — N39.0 ACUTE UTI: Status: ACTIVE | Noted: 2022-01-01

## 2023-01-01 ENCOUNTER — NON-APPOINTMENT (OUTPATIENT)
Age: 73
End: 2023-01-01

## 2023-01-01 ENCOUNTER — APPOINTMENT (OUTPATIENT)
Dept: OTOLARYNGOLOGY | Facility: CLINIC | Age: 73
End: 2023-01-01
Payer: MEDICARE

## 2023-01-01 ENCOUNTER — APPOINTMENT (OUTPATIENT)
Dept: CT IMAGING | Facility: IMAGING CENTER | Age: 73
End: 2023-01-01
Payer: MEDICARE

## 2023-01-01 ENCOUNTER — OUTPATIENT (OUTPATIENT)
Dept: OUTPATIENT SERVICES | Facility: HOSPITAL | Age: 73
LOS: 1 days | End: 2023-01-01
Payer: MEDICARE

## 2023-01-01 ENCOUNTER — APPOINTMENT (OUTPATIENT)
Dept: RADIOLOGY | Facility: HOSPITAL | Age: 73
End: 2023-01-01

## 2023-01-01 ENCOUNTER — APPOINTMENT (OUTPATIENT)
Dept: OTOLARYNGOLOGY | Facility: CLINIC | Age: 73
End: 2023-01-01

## 2023-01-01 ENCOUNTER — RX RENEWAL (OUTPATIENT)
Age: 73
End: 2023-01-01

## 2023-01-01 ENCOUNTER — EMERGENCY (EMERGENCY)
Facility: HOSPITAL | Age: 73
LOS: 1 days | Discharge: LEFT BEFORE DISCHARGE | End: 2023-01-01
Attending: EMERGENCY MEDICINE
Payer: MEDICARE

## 2023-01-01 VITALS
SYSTOLIC BLOOD PRESSURE: 120 MMHG | OXYGEN SATURATION: 99 % | TEMPERATURE: 98 F | HEART RATE: 70 BPM | RESPIRATION RATE: 18 BRPM | DIASTOLIC BLOOD PRESSURE: 78 MMHG

## 2023-01-01 VITALS
OXYGEN SATURATION: 100 % | HEART RATE: 59 BPM | SYSTOLIC BLOOD PRESSURE: 123 MMHG | DIASTOLIC BLOOD PRESSURE: 79 MMHG | BODY MASS INDEX: 27.47 KG/M2 | HEIGHT: 67 IN | WEIGHT: 175 LBS

## 2023-01-01 VITALS
RESPIRATION RATE: 20 BRPM | OXYGEN SATURATION: 97 % | DIASTOLIC BLOOD PRESSURE: 70 MMHG | HEART RATE: 72 BPM | TEMPERATURE: 98 F | SYSTOLIC BLOOD PRESSURE: 115 MMHG

## 2023-01-01 DIAGNOSIS — R49.0 DYSPHONIA: ICD-10-CM

## 2023-01-01 LAB
ALBUMIN SERPL ELPH-MCNC: 3.4 G/DL — SIGNIFICANT CHANGE UP (ref 3.3–5)
ALP SERPL-CCNC: 48 U/L — SIGNIFICANT CHANGE UP (ref 40–120)
ALT FLD-CCNC: 7 U/L — LOW (ref 10–45)
ANION GAP SERPL CALC-SCNC: 11 MMOL/L — SIGNIFICANT CHANGE UP (ref 5–17)
ANISOCYTOSIS BLD QL: SLIGHT — SIGNIFICANT CHANGE UP
AST SERPL-CCNC: 12 U/L — SIGNIFICANT CHANGE UP (ref 10–40)
BASOPHILS # BLD AUTO: 0 K/UL — SIGNIFICANT CHANGE UP (ref 0–0.2)
BASOPHILS NFR BLD AUTO: 0 % — SIGNIFICANT CHANGE UP (ref 0–2)
BILIRUB SERPL-MCNC: 0.3 MG/DL — SIGNIFICANT CHANGE UP (ref 0.2–1.2)
BUN SERPL-MCNC: 21 MG/DL — SIGNIFICANT CHANGE UP (ref 7–23)
CALCIUM SERPL-MCNC: 8.6 MG/DL — SIGNIFICANT CHANGE UP (ref 8.4–10.5)
CHLORIDE SERPL-SCNC: 104 MMOL/L — SIGNIFICANT CHANGE UP (ref 96–108)
CO2 SERPL-SCNC: 22 MMOL/L — SIGNIFICANT CHANGE UP (ref 22–31)
CREAT SERPL-MCNC: 0.72 MG/DL — SIGNIFICANT CHANGE UP (ref 0.5–1.3)
EGFR: 88 ML/MIN/1.73M2 — SIGNIFICANT CHANGE UP
EOSINOPHIL # BLD AUTO: 0 K/UL — SIGNIFICANT CHANGE UP (ref 0–0.5)
EOSINOPHIL NFR BLD AUTO: 0 % — SIGNIFICANT CHANGE UP (ref 0–6)
GLUCOSE SERPL-MCNC: 96 MG/DL — SIGNIFICANT CHANGE UP (ref 70–99)
HCOV PNL SPEC NAA+PROBE: DETECTED
HCT VFR BLD CALC: 27.1 % — LOW (ref 34.5–45)
HGB BLD-MCNC: 8.7 G/DL — LOW (ref 11.5–15.5)
LYMPHOCYTES # BLD AUTO: 0.39 K/UL — LOW (ref 1–3.3)
LYMPHOCYTES # BLD AUTO: 10.6 % — LOW (ref 13–44)
MACROCYTES BLD QL: SLIGHT — SIGNIFICANT CHANGE UP
MANUAL SMEAR VERIFICATION: SIGNIFICANT CHANGE UP
MCHC RBC-ENTMCNC: 32.1 GM/DL — SIGNIFICANT CHANGE UP (ref 32–36)
MCHC RBC-ENTMCNC: 36.7 PG — HIGH (ref 27–34)
MCV RBC AUTO: 114.3 FL — HIGH (ref 80–100)
MONOCYTES # BLD AUTO: 0.19 K/UL — SIGNIFICANT CHANGE UP (ref 0–0.9)
MONOCYTES NFR BLD AUTO: 5.3 % — SIGNIFICANT CHANGE UP (ref 2–14)
NEUTROPHILS # BLD AUTO: 3.08 K/UL — SIGNIFICANT CHANGE UP (ref 1.8–7.4)
NEUTROPHILS NFR BLD AUTO: 84.1 % — HIGH (ref 43–77)
OVALOCYTES BLD QL SMEAR: SLIGHT — SIGNIFICANT CHANGE UP
PLAT MORPH BLD: NORMAL — SIGNIFICANT CHANGE UP
PLATELET # BLD AUTO: 187 K/UL — SIGNIFICANT CHANGE UP (ref 150–400)
POIKILOCYTOSIS BLD QL AUTO: SLIGHT — SIGNIFICANT CHANGE UP
POTASSIUM SERPL-MCNC: 4.2 MMOL/L — SIGNIFICANT CHANGE UP (ref 3.5–5.3)
POTASSIUM SERPL-SCNC: 4.2 MMOL/L — SIGNIFICANT CHANGE UP (ref 3.5–5.3)
PROT SERPL-MCNC: 7.2 G/DL — SIGNIFICANT CHANGE UP (ref 6–8.3)
RAPID RVP RESULT: DETECTED
RBC # BLD: 2.37 M/UL — LOW (ref 3.8–5.2)
RBC # FLD: 15.7 % — HIGH (ref 10.3–14.5)
RBC BLD AUTO: ABNORMAL
SARS-COV-2 RNA SPEC QL NAA+PROBE: SIGNIFICANT CHANGE UP
SODIUM SERPL-SCNC: 137 MMOL/L — SIGNIFICANT CHANGE UP (ref 135–145)
WBC # BLD: 3.66 K/UL — LOW (ref 3.8–10.5)
WBC # FLD AUTO: 3.66 K/UL — LOW (ref 3.8–10.5)

## 2023-01-01 PROCEDURE — 0225U NFCT DS DNA&RNA 21 SARSCOV2: CPT

## 2023-01-01 PROCEDURE — 31579 LARYNGOSCOPY TELESCOPIC: CPT

## 2023-01-01 PROCEDURE — 99284 EMERGENCY DEPT VISIT MOD MDM: CPT

## 2023-01-01 PROCEDURE — 69210 REMOVE IMPACTED EAR WAX UNI: CPT

## 2023-01-01 PROCEDURE — 85025 COMPLETE CBC W/AUTO DIFF WBC: CPT

## 2023-01-01 PROCEDURE — 70490 CT SOFT TISSUE NECK W/O DYE: CPT

## 2023-01-01 PROCEDURE — 99284 EMERGENCY DEPT VISIT MOD MDM: CPT | Mod: CS

## 2023-01-01 PROCEDURE — 99214 OFFICE O/P EST MOD 30 MIN: CPT | Mod: 25

## 2023-01-01 PROCEDURE — 80053 COMPREHEN METABOLIC PANEL: CPT

## 2023-01-01 PROCEDURE — 70490 CT SOFT TISSUE NECK W/O DYE: CPT | Mod: 26,MH

## 2023-02-20 PROBLEM — C90.00 MULTIPLE MYELOMA: Status: ACTIVE | Noted: 2018-09-20

## 2023-02-20 PROBLEM — E85.9 AMYLOIDOSIS: Status: ACTIVE | Noted: 2019-01-28

## 2023-02-20 PROBLEM — N39.46 URGE AND STRESS INCONTINENCE: Status: ACTIVE | Noted: 2022-01-26

## 2023-02-20 PROBLEM — M48.07 LUMBOSACRAL STENOSIS: Status: ACTIVE | Noted: 2020-05-05

## 2023-03-02 NOTE — ED PROVIDER NOTE - NS ED ROS FT
CONST: no fevers, no chills  ENT: +sore throat, +congestion  CV: no chest pain, no leg swelling  RESP: no shortness of breath, +cough  ABD: no abdominal pain, no nausea, no vomiting, no diarrhea  : no dysuria, no flank pain, no hematuria  MSK: no back pain, no extremity pain  SKIN:  no rash

## 2023-03-02 NOTE — ED PROVIDER NOTE - PROGRESS NOTE DETAILS
Jabier LEÓN (PGY-3)  cbc unchanged from pt's baseline per outpatient records in [facility]. pt feels fine. ambulatory sat normal. pt states she does not want to wait for CT at this time, she would like to s/o AMA  The patient is leaving against medical advice. I have spoken with the patient and discussed the results and plans, but the patient continue to conveys unwillingness to stay and be treated. The patient understands the risk of leaving without further evaluation and workup. The risks includes possible worsening of symptoms, chronic disabilities, and possible death. The patient is told and understands that he/she is welcomed to come back to the ER at anytime for worsening symptoms. Jabier LEÓN (PGY-3)  cbc unchanged from pt's baseline per outpatient records in [facility]. pt feels fine. ambulatory sat normal. pt states she does not want to wait for CT at this time, she would like to s/o AMA  The patient is leaving against medical advice. I have spoken with the patient and discussed the results and plans, but the patient continue to conveys unwillingness to stay and be treated. The patient understands the risk of leaving without further evaluation and workup. The risks includes possible worsening of symptoms, chronic disabilities, and possible death. The patient is told and understands that he/she is welcomed to come back to the ER at anytime for worsening symptoms. Pt states she spoke with her oncologist on the phone and she will attempt to get the CT chest outpatient Jabier LEÓN (PGY-3)  cbc unchanged from pt's baseline per outpatient records in [facility]. pt feels fine. ambulatory sat normal. pt states she does not want to wait for CT at this time, she would like to s/o AMA  The patient is leaving against medical advice. I have spoken with the patient and discussed the results and plans, but the patient continue to conveys unwillingness to stay and be treated. The patient understands the risk of leaving without further evaluation and workup. The risks includes possible worsening of symptoms, chronic disabilities, and possible death. The patient is told and understands that he/she is welcomed to come back to the ER at anytime for worsening symptoms. Pt states she spoke with her oncologist on the phone and she will attempt to get the CT chest outpatient    Attempted to call Dr. Cano's office but no answer

## 2023-03-02 NOTE — ED PROVIDER NOTE - PATIENT PORTAL LINK FT
You can access the FollowMyHealth Patient Portal offered by [facility] by registering at the following website: [email or web address]. By joining [employer]’s FollowMyHealth portal, you will also be able to view your health information using other applications (apps) compatible with our system.

## 2023-03-02 NOTE — ED PROVIDER NOTE - OBJECTIVE STATEMENT
72F PMH PAF on xarelto s/p watchman, chronic diastolic HF, MGUS, s/p PPM, paralyzed vocal cord, multiple myeloma, amyloidosis, scleromyxedema, DVT in RLE p/w dry cough for 2 weeks, now more productive of clear/brown sputum. Went to [facility] where her covid test was positive today. Oncologist told her to come to ED for evaluation; they wanted her to get a CT scan b/c of the xray. CXR shows Unchanged opacity at the lung apices bilaterally, greater on   the right. Correlation with CT can be performed to further characterize. I spoke with Dr. Cano who states the [facility] called him and told him that pt should have CT of chest performed due to the CXR finding, but he was not sure what the cxr finding was or how pt was doing. Agreed to perform CT chest noncont; if pt's CT unchanged, he is ok with discharge with pcp f/u    PCP: Dr. Kylee Ellis  Oncologist: Dr. Ciro Cano

## 2023-03-02 NOTE — ED PROVIDER NOTE - CLINICAL SUMMARY MEDICAL DECISION MAKING FREE TEXT BOX
72F PMH PAF on xarelto s/p watchman, chronic diastolic HF, MGUS, s/p PPM, paralyzed vocal cord, multiple myeloma, amyloidosis, scleromyxedema, DVT in RLE p/w dry cough for 2 weeks, now more productive of clear/brown sputum. VS and exam as above  Likely COVID URI leading to symptoms. Pt not hypoxic, denying cp/sob as well. Lower concern for bacterial pna but since pt is immunocompromised, will eval with CT chest noncont, reassess symptoms

## 2023-03-02 NOTE — ED PROVIDER NOTE - PHYSICAL EXAMINATION
Physical Exam:  Gen: NAD, non-toxic appearing, awake alert   HEENT: normal conjunctiva, oral mucosa moist  Lung: CTAB, no respiratory distress, no wheezes/rhonchi/rales B/L, speaking in full sentences  CV: RRR  Abd: soft, NT, ND, no guarding, no rigidity, no rebound tenderness  MSK: no visible deformities  Skin: Warm, well perfused  ~Lorenzo Eugene MD (PGY-3)

## 2023-03-02 NOTE — ED PROVIDER NOTE - ATTENDING CONTRIBUTION TO CARE
Private Physician [facility] Onc. Kylee Ellis PCP  72y F pmh Afib on xeralto, CHF, MUGS, PPM/Watchman, Mulitple myeloma, amyloid, Scleromyxedema, DVT, PT comet to ed c/o cough for past two weeks, Now productive. yellow/green, no fever, chills w sensation of drainsinus, no sob/cp/abd pain/nvd. Pt spoke to pmd/onc and referred to ed. No change in chronic edema PE WDWN female awake alert normocephalic atraumatic neck supple chest clear anterior & posterior abd soft +bs no mass guarding ray pedal edema.  Gianluca Mustafa MD, Facep

## 2023-03-02 NOTE — ED PROVIDER NOTE - NSFOLLOWUPINSTRUCTIONS_ED_ALL_ED_FT
Follow up with your primary care doctor over the phone in 2-3 days for evaluation of your symptoms    Please take over the counter pain medications such as tylenols (650mg every 4 hours) for fevers    Return to the Emergency Department if you have any new or worsening symptoms

## 2023-03-02 NOTE — ED ADULT NURSE NOTE - OBJECTIVE STATEMENT
72y F BIBEMS. Pt is axo4. PMH of A-Fib, HLD, Multiple Myeloma. Presents with cough, fatigue x 1 day. Pt states that she was scheduled for chemo treatment this morning but did not feel well so called ahead and was told to go to an urgent care for her symptoms. Had received a covid-19 swab which came back positive and chest x-ray. Pt states that the urgent care had called the pt after she left and was recommended to go to the ED for further evaluation. Pt has meta port on R upper chest, no signs of infections, no redness, no drainage. Denies headache, dizziness, vision changes, chest pain, shortness of breath, abdominal pain, nausea, vomiting, diarrhea, fevers, chills, dysuria, hematuria, recent illness travel or fall. Patient undressed and placed into gown, call bell in hand and side rails up with bed in lowest position for safety. blanket provided. Comfort and safety provided.

## 2023-03-11 NOTE — HEALTH RISK ASSESSMENT
[Never] : Never [Patient reported mammogram was normal] : Patient reported mammogram was normal [Patient reported colonoscopy was normal] : Patient reported colonoscopy was normal [HIV test declined] : HIV test declined [Hepatitis C test declined] : Hepatitis C test declined [MammogramDate] : 01/22 [ColonoscopyDate] : 01/19

## 2023-03-11 NOTE — PHYSICAL EXAM
[Normal Outer Ear/Nose] : the outer ears and nose were normal in appearance [Normal TMs] : both tympanic membranes were normal [Normal] : affect was normal and insight and judgment were intact [No Acute Distress] : no acute distress [Well Nourished] : well nourished [Well Developed] : well developed [Well-Appearing] : well-appearing [Normal Sclera/Conjunctiva] : normal sclera/conjunctiva [PERRL] : pupils equal round and reactive to light [EOMI] : extraocular movements intact [No JVD] : no jugular venous distention [No Lymphadenopathy] : no lymphadenopathy [Supple] : supple [Thyroid Normal, No Nodules] : the thyroid was normal and there were no nodules present [No Respiratory Distress] : no respiratory distress  [No Accessory Muscle Use] : no accessory muscle use [Clear to Auscultation] : lungs were clear to auscultation bilaterally [Normal Rate] : normal rate  [Regular Rhythm] : with a regular rhythm [Normal S1, S2] : normal S1 and S2 [No Murmur] : no murmur heard [No Carotid Bruits] : no carotid bruits [No Abdominal Bruit] : a ~M bruit was not heard ~T in the abdomen [No Varicosities] : no varicosities [Pedal Pulses Present] : the pedal pulses are present [No Edema] : there was no peripheral edema [No Palpable Aorta] : no palpable aorta [No Extremity Clubbing/Cyanosis] : no extremity clubbing/cyanosis [Soft] : abdomen soft [Non Tender] : non-tender [Non-distended] : non-distended [No Masses] : no abdominal mass palpated [No HSM] : no HSM [Normal Bowel Sounds] : normal bowel sounds [Normal Posterior Cervical Nodes] : no posterior cervical lymphadenopathy [Normal Anterior Cervical Nodes] : no anterior cervical lymphadenopathy [No CVA Tenderness] : no CVA  tenderness [No Spinal Tenderness] : no spinal tenderness [No Joint Swelling] : no joint swelling [Grossly Normal Strength/Tone] : grossly normal strength/tone [No Rash] : no rash [Coordination Grossly Intact] : coordination grossly intact [Normal Affect] : the affect was normal [Normal Insight/Judgement] : insight and judgment were intact [de-identified] : ambulating with wide based gait and cane [de-identified] : dense fibrocystic breasts

## 2023-03-11 NOTE — HISTORY OF PRESENT ILLNESS
[FreeTextEntry1] : Pt. presents for a comprehensive evaluation for multiple medical issues.\par  [de-identified] : Patient presents for followup of multiple issues. \par She developed a hacking and persistent cough and in 12/31/2017 she was admitted with Pneumonia and Pneumothorax requiring a chest tube.\par She continued to have persistent cough with thick mucous production-unresponsive to inhalers.\par She underwent a bronchoscopy in 3/2018 with Dr. Dimitry Ruiz and was  diagnosed with pulmonary amyloidosis- \par She was started on treatment with Darzalex- IV infusions weekly for 8 weeks followed by IV infusions every other week for 8 treatments- and in 10/2018 she will go down to IV infusions monthly.\par \par She continues to get treatment with IVIG every 2 weeks with Dr. Cano\par \par Chest continued severe neuralgia and pain due to peripheral neuropathy of her legs. She is now on low-dose hydromorphone p.r.n. breakthrough pain.

## 2023-03-29 NOTE — HISTORY OF PRESENT ILLNESS
[de-identified] : 72 year old female with history of amyloidosis and multiple myeloma.\par Referred by Dr. Shane for evaluation of dysphonia and dysphagia.\par Seen by Dr. Shane 4/14 with vocal cord paralysis on exam.\par Patient reports voice hoarseness, frequent cough and occasional difficulty swallowing.\par Notes difficulty swallowing both solids and liquids. No MBS.\par States this began about 6 months ago. Recovering from COVID (positive at the beginning of March).\par Since has post nasal drip and runny nose. No facial pressure.

## 2023-03-29 NOTE — CONSULT LETTER
[Dear  ___] : Dear  [unfilled], [Consult Letter:] : I had the pleasure of evaluating your patient, [unfilled]. [Please see my note below.] : Please see my note below. [Consult Closing:] : Thank you very much for allowing me to participate in the care of this patient.  If you have any questions, please do not hesitate to contact me. [Sincerely,] : Sincerely, [FreeTextEntry2] : Dr. Shane [FreeTextEntry3] : Spencer Waters MD, PhD\par Chief, Division of Laryngology\par Department of Otolaryngology\par [facility]\par Pediatric Otolaryngology, [facility]\par  of Otolaryngology\par [facility]

## 2023-09-28 ENCOUNTER — NON-APPOINTMENT (OUTPATIENT)
Age: 73
End: 2023-09-28

## 2023-10-30 ENCOUNTER — APPOINTMENT (OUTPATIENT)
Dept: INTERNAL MEDICINE | Facility: CLINIC | Age: 73
End: 2023-10-30

## 2024-04-16 NOTE — REASON FOR VISIT
[Initial Consultation] : an initial consultation for [FreeTextEntry2] : dysphonia and dysphagia.  fall

## 2025-06-25 NOTE — ED PROVIDER NOTE - CROS ED NEURO NEG
Plan: Great results w/ tolak. Pt has samples at home. Repeat treatment as directed.
Detail Level: Detailed
Initiate Treatment: Tolak QHS x 2-4 wks as tolerated, will be red/crusts
no difficulty walking/imbalance
